# Patient Record
Sex: MALE | Race: WHITE | NOT HISPANIC OR LATINO | URBAN - METROPOLITAN AREA
[De-identification: names, ages, dates, MRNs, and addresses within clinical notes are randomized per-mention and may not be internally consistent; named-entity substitution may affect disease eponyms.]

---

## 2017-03-10 ENCOUNTER — INPATIENT (INPATIENT)
Facility: HOSPITAL | Age: 82
LOS: 6 days | Discharge: HOME HEALTH | End: 2017-03-17
Attending: INTERNAL MEDICINE | Admitting: INTERNAL MEDICINE
Payer: MEDICARE

## 2017-03-10 VITALS
WEIGHT: 160.06 LBS | HEART RATE: 81 BPM | OXYGEN SATURATION: 100 % | TEMPERATURE: 98 F | RESPIRATION RATE: 16 BRPM | DIASTOLIC BLOOD PRESSURE: 81 MMHG | SYSTOLIC BLOOD PRESSURE: 189 MMHG

## 2017-03-10 LAB
ALBUMIN SERPL ELPH-MCNC: 3.8 G/DL — SIGNIFICANT CHANGE UP (ref 3.3–5)
ALP SERPL-CCNC: 79 U/L — SIGNIFICANT CHANGE UP (ref 40–120)
ALT FLD-CCNC: 39 U/L — SIGNIFICANT CHANGE UP (ref 12–78)
ANION GAP SERPL CALC-SCNC: 11 MMOL/L — SIGNIFICANT CHANGE UP (ref 5–17)
ANION GAP SERPL CALC-SCNC: 13 MMOL/L — SIGNIFICANT CHANGE UP (ref 5–17)
APTT BLD: 37.7 SEC — HIGH (ref 27.5–37.4)
AST SERPL-CCNC: 83 U/L — HIGH (ref 15–37)
BASE EXCESS BLDA CALC-SCNC: -1 MMOL/L — SIGNIFICANT CHANGE UP (ref -2–2)
BASOPHILS # BLD AUTO: 0.1 K/UL — SIGNIFICANT CHANGE UP (ref 0–0.2)
BASOPHILS NFR BLD AUTO: 0.8 % — SIGNIFICANT CHANGE UP (ref 0–2)
BILIRUB SERPL-MCNC: 1.2 MG/DL — SIGNIFICANT CHANGE UP (ref 0.2–1.2)
BLOOD GAS COMMENTS ARTERIAL: SIGNIFICANT CHANGE UP
BUN SERPL-MCNC: 19 MG/DL — SIGNIFICANT CHANGE UP (ref 7–23)
BUN SERPL-MCNC: 21 MG/DL — SIGNIFICANT CHANGE UP (ref 7–23)
CALCIUM SERPL-MCNC: 8.3 MG/DL — LOW (ref 8.5–10.1)
CALCIUM SERPL-MCNC: 8.7 MG/DL — SIGNIFICANT CHANGE UP (ref 8.5–10.1)
CHLORIDE SERPL-SCNC: 101 MMOL/L — SIGNIFICANT CHANGE UP (ref 96–108)
CHLORIDE SERPL-SCNC: 101 MMOL/L — SIGNIFICANT CHANGE UP (ref 96–108)
CK SERPL-CCNC: 446 U/L — HIGH (ref 26–308)
CO2 SERPL-SCNC: 22 MMOL/L — SIGNIFICANT CHANGE UP (ref 22–31)
CO2 SERPL-SCNC: 26 MMOL/L — SIGNIFICANT CHANGE UP (ref 22–31)
CREAT SERPL-MCNC: 1.13 MG/DL — SIGNIFICANT CHANGE UP (ref 0.5–1.3)
CREAT SERPL-MCNC: 1.29 MG/DL — SIGNIFICANT CHANGE UP (ref 0.5–1.3)
EOSINOPHIL # BLD AUTO: 0 K/UL — SIGNIFICANT CHANGE UP (ref 0–0.5)
EOSINOPHIL NFR BLD AUTO: 0.1 % — SIGNIFICANT CHANGE UP (ref 0–6)
GLUCOSE SERPL-MCNC: 164 MG/DL — HIGH (ref 70–99)
GLUCOSE SERPL-MCNC: 165 MG/DL — HIGH (ref 70–99)
HCO3 BLDA-SCNC: 23 MMOL/L — SIGNIFICANT CHANGE UP (ref 21–29)
HCT VFR BLD CALC: 34.7 % — LOW (ref 39–50)
HCT VFR BLD CALC: 36 % — LOW (ref 39–50)
HCT VFR BLD CALC: 37.4 % — LOW (ref 39–50)
HGB BLD-MCNC: 11.5 G/DL — LOW (ref 13–17)
HGB BLD-MCNC: 12.3 G/DL — LOW (ref 13–17)
HGB BLD-MCNC: 12.8 G/DL — LOW (ref 13–17)
HPIV3 RNA SPEC QL NAA+PROBE: DETECTED
INR BLD: 1.76 RATIO — HIGH (ref 0.88–1.16)
INR BLD: 1.87 RATIO — HIGH (ref 0.88–1.16)
LACTATE SERPL-SCNC: 2.1 MMOL/L — HIGH (ref 0.7–2)
LACTATE SERPL-SCNC: 3.8 MMOL/L — HIGH (ref 0.7–2)
LIDOCAIN IGE QN: 79 U/L — SIGNIFICANT CHANGE UP (ref 73–393)
LYMPHOCYTES # BLD AUTO: 2.4 K/UL — SIGNIFICANT CHANGE UP (ref 1–3.3)
LYMPHOCYTES # BLD AUTO: 23.4 % — SIGNIFICANT CHANGE UP (ref 13–44)
MAGNESIUM SERPL-MCNC: 1.8 MG/DL — SIGNIFICANT CHANGE UP (ref 1.8–2.4)
MAGNESIUM SERPL-MCNC: 2.1 MG/DL — SIGNIFICANT CHANGE UP (ref 1.8–2.4)
MCHC RBC-ENTMCNC: 32.9 GM/DL — SIGNIFICANT CHANGE UP (ref 32–36)
MCHC RBC-ENTMCNC: 33 GM/DL — SIGNIFICANT CHANGE UP (ref 32–36)
MCHC RBC-ENTMCNC: 33.1 PG — SIGNIFICANT CHANGE UP (ref 27–34)
MCHC RBC-ENTMCNC: 33.2 PG — SIGNIFICANT CHANGE UP (ref 27–34)
MCHC RBC-ENTMCNC: 35.7 GM/DL — SIGNIFICANT CHANGE UP (ref 32–36)
MCHC RBC-ENTMCNC: 35.8 PG — HIGH (ref 27–34)
MCV RBC AUTO: 100.3 FL — HIGH (ref 80–100)
MCV RBC AUTO: 100.6 FL — HIGH (ref 80–100)
MCV RBC AUTO: 100.6 FL — HIGH (ref 80–100)
MONOCYTES # BLD AUTO: 1.1 K/UL — HIGH (ref 0–0.9)
MONOCYTES NFR BLD AUTO: 11.2 % — SIGNIFICANT CHANGE UP (ref 2–14)
NEUTROPHILS # BLD AUTO: 6.5 K/UL — SIGNIFICANT CHANGE UP (ref 1.8–7.4)
NEUTROPHILS NFR BLD AUTO: 64.4 % — SIGNIFICANT CHANGE UP (ref 43–77)
NT-PROBNP SERPL-SCNC: HIGH PG/ML (ref 0–450)
NT-PROBNP SERPL-SCNC: HIGH PG/ML (ref 0–450)
PCO2 BLDA: 40 MMHG — SIGNIFICANT CHANGE UP (ref 32–46)
PH BLDA: 7.39 — SIGNIFICANT CHANGE UP (ref 7.35–7.45)
PHOSPHATE SERPL-MCNC: 2.4 MG/DL — LOW (ref 2.5–4.5)
PHOSPHATE SERPL-MCNC: 2.7 MG/DL — SIGNIFICANT CHANGE UP (ref 2.5–4.5)
PLATELET # BLD AUTO: 112 K/UL — LOW (ref 150–400)
PLATELET # BLD AUTO: 114 K/UL — LOW (ref 150–400)
PLATELET # BLD AUTO: 133 K/UL — LOW (ref 150–400)
PO2 BLDA: 99 — SIGNIFICANT CHANGE UP
POTASSIUM SERPL-MCNC: 3.4 MMOL/L — LOW (ref 3.5–5.3)
POTASSIUM SERPL-MCNC: 4.7 MMOL/L — SIGNIFICANT CHANGE UP (ref 3.5–5.3)
POTASSIUM SERPL-SCNC: 3.4 MMOL/L — LOW (ref 3.5–5.3)
POTASSIUM SERPL-SCNC: 4.7 MMOL/L — SIGNIFICANT CHANGE UP (ref 3.5–5.3)
PROT SERPL-MCNC: 7.8 GM/DL — SIGNIFICANT CHANGE UP (ref 6–8.3)
PROTHROM AB SERPL-ACNC: 19.5 SEC — HIGH (ref 10–13.1)
PROTHROM AB SERPL-ACNC: 20.7 SEC — HIGH (ref 10–13.1)
RAPID RVP RESULT: DETECTED
RBC # BLD: 3.45 M/UL — LOW (ref 4.2–5.8)
RBC # BLD: 3.59 M/UL — LOW (ref 4.2–5.8)
RBC # BLD: 3.72 M/UL — LOW (ref 4.2–5.8)
RBC # FLD: 12.4 % — SIGNIFICANT CHANGE UP (ref 10.3–14.5)
RBC # FLD: 12.4 % — SIGNIFICANT CHANGE UP (ref 10.3–14.5)
RBC # FLD: 12.6 % — SIGNIFICANT CHANGE UP (ref 10.3–14.5)
SAO2 % BLDA: 97 — SIGNIFICANT CHANGE UP
SODIUM SERPL-SCNC: 136 MMOL/L — SIGNIFICANT CHANGE UP (ref 135–145)
SODIUM SERPL-SCNC: 138 MMOL/L — SIGNIFICANT CHANGE UP (ref 135–145)
TROPONIN I SERPL-MCNC: 0.17 NG/ML — HIGH (ref 0.01–0.04)
TROPONIN I SERPL-MCNC: 0.17 NG/ML — HIGH (ref 0.01–0.04)
WBC # BLD: 10.2 K/UL — SIGNIFICANT CHANGE UP (ref 3.8–10.5)
WBC # BLD: 10.2 K/UL — SIGNIFICANT CHANGE UP (ref 3.8–10.5)
WBC # BLD: 8.5 K/UL — SIGNIFICANT CHANGE UP (ref 3.8–10.5)
WBC # FLD AUTO: 10.2 K/UL — SIGNIFICANT CHANGE UP (ref 3.8–10.5)
WBC # FLD AUTO: 10.2 K/UL — SIGNIFICANT CHANGE UP (ref 3.8–10.5)
WBC # FLD AUTO: 8.5 K/UL — SIGNIFICANT CHANGE UP (ref 3.8–10.5)

## 2017-03-10 PROCEDURE — 99285 EMERGENCY DEPT VISIT HI MDM: CPT

## 2017-03-10 PROCEDURE — 93010 ELECTROCARDIOGRAM REPORT: CPT

## 2017-03-10 PROCEDURE — 93306 TTE W/DOPPLER COMPLETE: CPT | Mod: 26

## 2017-03-10 PROCEDURE — 71010: CPT | Mod: 26

## 2017-03-10 PROCEDURE — 71010: CPT | Mod: 26,77

## 2017-03-10 RX ORDER — INSULIN LISPRO 100/ML
VIAL (ML) SUBCUTANEOUS
Qty: 0 | Refills: 0 | Status: DISCONTINUED | OUTPATIENT
Start: 2017-03-10 | End: 2017-03-17

## 2017-03-10 RX ORDER — HEPARIN SODIUM 5000 [USP'U]/ML
5000 INJECTION INTRAVENOUS; SUBCUTANEOUS EVERY 8 HOURS
Qty: 0 | Refills: 0 | Status: DISCONTINUED | OUTPATIENT
Start: 2017-03-10 | End: 2017-03-10

## 2017-03-10 RX ORDER — SODIUM CHLORIDE 9 MG/ML
1000 INJECTION INTRAMUSCULAR; INTRAVENOUS; SUBCUTANEOUS ONCE
Qty: 0 | Refills: 0 | Status: COMPLETED | OUTPATIENT
Start: 2017-03-10 | End: 2017-03-10

## 2017-03-10 RX ORDER — SODIUM CHLORIDE 9 MG/ML
1000 INJECTION, SOLUTION INTRAVENOUS
Qty: 0 | Refills: 0 | Status: DISCONTINUED | OUTPATIENT
Start: 2017-03-10 | End: 2017-03-17

## 2017-03-10 RX ORDER — DEXTROSE 50 % IN WATER 50 %
12.5 SYRINGE (ML) INTRAVENOUS ONCE
Qty: 0 | Refills: 0 | Status: DISCONTINUED | OUTPATIENT
Start: 2017-03-10 | End: 2017-03-17

## 2017-03-10 RX ORDER — ACETAMINOPHEN 500 MG
650 TABLET ORAL ONCE
Qty: 0 | Refills: 0 | Status: COMPLETED | OUTPATIENT
Start: 2017-03-10 | End: 2017-03-10

## 2017-03-10 RX ORDER — PIPERACILLIN AND TAZOBACTAM 4; .5 G/20ML; G/20ML
3.38 INJECTION, POWDER, LYOPHILIZED, FOR SOLUTION INTRAVENOUS ONCE
Qty: 0 | Refills: 0 | Status: COMPLETED | OUTPATIENT
Start: 2017-03-10 | End: 2017-03-10

## 2017-03-10 RX ORDER — PIPERACILLIN AND TAZOBACTAM 4; .5 G/20ML; G/20ML
2.25 INJECTION, POWDER, LYOPHILIZED, FOR SOLUTION INTRAVENOUS EVERY 6 HOURS
Qty: 0 | Refills: 0 | Status: DISCONTINUED | OUTPATIENT
Start: 2017-03-10 | End: 2017-03-10

## 2017-03-10 RX ORDER — WARFARIN SODIUM 2.5 MG/1
2.5 TABLET ORAL DAILY
Qty: 0 | Refills: 0 | Status: DISCONTINUED | OUTPATIENT
Start: 2017-03-10 | End: 2017-03-13

## 2017-03-10 RX ORDER — ASPIRIN/CALCIUM CARB/MAGNESIUM 324 MG
325 TABLET ORAL ONCE
Qty: 0 | Refills: 0 | Status: COMPLETED | OUTPATIENT
Start: 2017-03-10 | End: 2017-03-10

## 2017-03-10 RX ORDER — SODIUM CHLORIDE 9 MG/ML
200 INJECTION INTRAMUSCULAR; INTRAVENOUS; SUBCUTANEOUS ONCE
Qty: 0 | Refills: 0 | Status: COMPLETED | OUTPATIENT
Start: 2017-03-10 | End: 2017-03-10

## 2017-03-10 RX ORDER — SIMVASTATIN 20 MG/1
40 TABLET, FILM COATED ORAL AT BEDTIME
Qty: 0 | Refills: 0 | Status: DISCONTINUED | OUTPATIENT
Start: 2017-03-10 | End: 2017-03-17

## 2017-03-10 RX ORDER — PIPERACILLIN AND TAZOBACTAM 4; .5 G/20ML; G/20ML
3.38 INJECTION, POWDER, LYOPHILIZED, FOR SOLUTION INTRAVENOUS EVERY 8 HOURS
Qty: 0 | Refills: 0 | Status: DISCONTINUED | OUTPATIENT
Start: 2017-03-10 | End: 2017-03-13

## 2017-03-10 RX ORDER — IPRATROPIUM/ALBUTEROL SULFATE 18-103MCG
3 AEROSOL WITH ADAPTER (GRAM) INHALATION ONCE
Qty: 0 | Refills: 0 | Status: COMPLETED | OUTPATIENT
Start: 2017-03-10 | End: 2017-03-10

## 2017-03-10 RX ORDER — METOPROLOL TARTRATE 50 MG
50 TABLET ORAL DAILY
Qty: 0 | Refills: 0 | Status: DISCONTINUED | OUTPATIENT
Start: 2017-03-10 | End: 2017-03-17

## 2017-03-10 RX ORDER — VANCOMYCIN HCL 1 G
1000 VIAL (EA) INTRAVENOUS ONCE
Qty: 0 | Refills: 0 | Status: COMPLETED | OUTPATIENT
Start: 2017-03-10 | End: 2017-03-10

## 2017-03-10 RX ORDER — DEXTROSE 50 % IN WATER 50 %
25 SYRINGE (ML) INTRAVENOUS ONCE
Qty: 0 | Refills: 0 | Status: DISCONTINUED | OUTPATIENT
Start: 2017-03-10 | End: 2017-03-17

## 2017-03-10 RX ORDER — LEVOTHYROXINE SODIUM 125 MCG
75 TABLET ORAL DAILY
Qty: 0 | Refills: 0 | Status: DISCONTINUED | OUTPATIENT
Start: 2017-03-10 | End: 2017-03-17

## 2017-03-10 RX ORDER — DILTIAZEM HCL 120 MG
120 CAPSULE, EXT RELEASE 24 HR ORAL DAILY
Qty: 0 | Refills: 0 | Status: DISCONTINUED | OUTPATIENT
Start: 2017-03-10 | End: 2017-03-17

## 2017-03-10 RX ORDER — GLUCAGON INJECTION, SOLUTION 0.5 MG/.1ML
1 INJECTION, SOLUTION SUBCUTANEOUS ONCE
Qty: 0 | Refills: 0 | Status: DISCONTINUED | OUTPATIENT
Start: 2017-03-10 | End: 2017-03-17

## 2017-03-10 RX ORDER — DIGOXIN 250 MCG
0.12 TABLET ORAL DAILY
Qty: 0 | Refills: 0 | Status: DISCONTINUED | OUTPATIENT
Start: 2017-03-10 | End: 2017-03-17

## 2017-03-10 RX ORDER — ASPIRIN/CALCIUM CARB/MAGNESIUM 324 MG
81 TABLET ORAL ONCE
Qty: 0 | Refills: 0 | Status: COMPLETED | OUTPATIENT
Start: 2017-03-10 | End: 2017-03-10

## 2017-03-10 RX ORDER — FUROSEMIDE 40 MG
40 TABLET ORAL ONCE
Qty: 0 | Refills: 0 | Status: COMPLETED | OUTPATIENT
Start: 2017-03-10 | End: 2017-03-10

## 2017-03-10 RX ORDER — DEXTROSE 50 % IN WATER 50 %
1 SYRINGE (ML) INTRAVENOUS ONCE
Qty: 0 | Refills: 0 | Status: DISCONTINUED | OUTPATIENT
Start: 2017-03-10 | End: 2017-03-17

## 2017-03-10 RX ORDER — FUROSEMIDE 40 MG
40 TABLET ORAL EVERY 12 HOURS
Qty: 0 | Refills: 0 | Status: DISCONTINUED | OUTPATIENT
Start: 2017-03-10 | End: 2017-03-12

## 2017-03-10 RX ADMIN — Medication 325 MILLIGRAM(S): at 22:23

## 2017-03-10 RX ADMIN — Medication 81 MILLIGRAM(S): at 09:28

## 2017-03-10 RX ADMIN — Medication 0.12 MILLIGRAM(S): at 12:57

## 2017-03-10 RX ADMIN — Medication 50 MILLIGRAM(S): at 12:57

## 2017-03-10 RX ADMIN — Medication 3 MILLILITER(S): at 20:53

## 2017-03-10 RX ADMIN — Medication 40 MILLIGRAM(S): at 17:41

## 2017-03-10 RX ADMIN — Medication 125 MILLIGRAM(S): at 22:22

## 2017-03-10 RX ADMIN — Medication 75 MICROGRAM(S): at 12:57

## 2017-03-10 RX ADMIN — PIPERACILLIN AND TAZOBACTAM 200 GRAM(S): 4; .5 INJECTION, POWDER, LYOPHILIZED, FOR SOLUTION INTRAVENOUS at 07:59

## 2017-03-10 RX ADMIN — SIMVASTATIN 40 MILLIGRAM(S): 20 TABLET, FILM COATED ORAL at 23:47

## 2017-03-10 RX ADMIN — WARFARIN SODIUM 2.5 MILLIGRAM(S): 2.5 TABLET ORAL at 22:02

## 2017-03-10 RX ADMIN — SODIUM CHLORIDE 3000 MILLILITER(S): 9 INJECTION INTRAMUSCULAR; INTRAVENOUS; SUBCUTANEOUS at 08:00

## 2017-03-10 RX ADMIN — Medication 250 MILLIGRAM(S): at 08:30

## 2017-03-10 RX ADMIN — Medication 40 MILLIGRAM(S): at 12:57

## 2017-03-10 RX ADMIN — Medication 3 MILLILITER(S): at 07:21

## 2017-03-10 RX ADMIN — Medication 120 MILLIGRAM(S): at 12:57

## 2017-03-10 RX ADMIN — PIPERACILLIN AND TAZOBACTAM 200 GRAM(S): 4; .5 INJECTION, POWDER, LYOPHILIZED, FOR SOLUTION INTRAVENOUS at 22:02

## 2017-03-10 RX ADMIN — SODIUM CHLORIDE 600 MILLILITER(S): 9 INJECTION INTRAMUSCULAR; INTRAVENOUS; SUBCUTANEOUS at 13:04

## 2017-03-10 RX ADMIN — SODIUM CHLORIDE 3000 MILLILITER(S): 9 INJECTION INTRAMUSCULAR; INTRAVENOUS; SUBCUTANEOUS at 09:28

## 2017-03-10 RX ADMIN — Medication 650 MILLIGRAM(S): at 08:25

## 2017-03-10 NOTE — H&P ADULT - HISTORY OF PRESENT ILLNESS
89 yo male with h/o CAD come c/o SOB; he started feeling sick few days prior to adm but the past 24 hrs his breathing worsened, became anorectic and had nausea and vomiting; no CP no LOC; he is SOB, can't speak full sentences O2 sat 95% on 2 L, overall VS stable he will be adm to tele; lactate noted.    PMHX: CAD, CABG, STENTS, HYPOTHYROIDISM,  PSHX: CABG  FAM HX-NEG  SOCHX: NEG

## 2017-03-10 NOTE — ED ADULT NURSE REASSESSMENT NOTE - NS ED NURSE REASSESS COMMENT FT1
pt who initially refused rectal temp did give permission.  Temp is 101.6R.  Pt given tylenol, zosyn per md order.  Blankets removed, pt given sheet.   Explained to pt that the blankets will worsen the fever.  Pt states understanding.

## 2017-03-10 NOTE — H&P ADULT - NSHPPHYSICALEXAM_GEN_ALL_CORE
PHYSICAL EXAM:      ENMT: nc/at, perrla, eomi    Neck:supple no jvd    Respiratory: decreased BS bilat, bibasilar crackles    Cardiovascular: s1s2 irreg    Gastrointestinal: soft, nt/nd, + BS    Genitourinary: deferred    Rectal: deferred    Extremities: no edema, clubbing cyanosis    Vascular: intact    Neurological: mot str 5/5 all extremities, no sens deficit    Skin: dry

## 2017-03-10 NOTE — DIETITIAN INITIAL EVALUATION ADULT. - OTHER INFO
Pt reports little to no change in wt over the past few months. Pt has documented wt loss over 2 years (8% of BW not sig. wt loss)

## 2017-03-10 NOTE — H&P ADULT - ASSESSMENT
89 yo male with h/o CAD come c/o SOB    * SOB diff dx: CHF exac sec to PNA ( viral vs bacterial)  - parainfl and RSV +  - add lasix IV bid  - nebs  - check echo; dc Cardizem if EF low    * AF: resume vka, dig and cardizem for now

## 2017-03-10 NOTE — CONSULT NOTE ADULT - SUBJECTIVE AND OBJECTIVE BOX
Patient is a 89y old  Male who presents with a chief complaint of sob (10 Mar 2017 10:19)      HPI:  89 yo male with h/o CAD come c/o SOB; he started feeling sick few days prior to adm but the past 24 hrs his breathing worsened, became anorectic and had nausea and vomiting; no CP no LOC; he is SOB, can't speak full sentences O2 sat 95% on 2 L, overall VS stable he will be adm to tele; lactate noted.    PMHX: CAD, CABG, STENTS, HYPOTHYROIDISM,  PSHX: CABG, PPM  FAM HX-NEG  SOCHX: NEG (10 Mar 2017 10:19)        MEDICATIONS  (STANDING):  furosemide   Injectable 40milliGRAM(s) IV Push every 12 hours  diltiazem   CD 120milliGRAM(s) Oral daily  digoxin     Tablet 0.125milliGRAM(s) Oral daily  warfarin 2.5milliGRAM(s) Oral daily  metoprolol succinate ER 50milliGRAM(s) Oral daily  levothyroxine  Oral Tab/Cap - Peds 75MICROGram(s) Oral daily  simvastatin 40milliGRAM(s) Oral at bedtime  insulin lispro (HumaLOG) corrective regimen sliding scale  SubCutaneous three times a day before meals  dextrose 5%. 1000milliLiter(s) IV Continuous <Continuous>  dextrose 50% Injectable 12.5Gram(s) IV Push once  dextrose 50% Injectable 25Gram(s) IV Push once  dextrose 50% Injectable 25Gram(s) IV Push once    MEDICATIONS  (PRN):  dextrose Gel 1Dose(s) Oral once PRN Blood Glucose LESS THAN 70 milliGRAM(s)/deciliter  glucagon  Injectable 1milliGRAM(s) IntraMuscular once PRN Glucose LESS THAN 70 milligrams/deciliter      FAMILY HISTORY:  No pertinent family history in first degree relatives      Vital Signs Last 24 Hrs  T(C): 36.6, Max: 38.7 (03-10 @ 07:25)  T(F): 97.9, Max: 101.6 (03-10 @ 07:25)  HR: 66 (66 - 103)  BP: 124/59 (109/61 - 189/81)  BP(mean): --  RR: 18 (16 - 25)  SpO2: 96% (95% - 100%)    I&O's Summary    PHYSICAL EXAM  General Appearance: acutely ill  HEENT: PERRL, conjunctiva clear, EOM's intact, non injected pharynx, no exudate, TM   normal  Neck: Supple, , no adenopathy, thyroid: not enlarged, no carotid bruit or JVD  Back: Symmetric, no  tenderness,no soft tissue tenderness  Lungs: slight bibasilar rales  Heart:irregular rhythm no murmur or gallop  Abdomen: Soft, non-tender, bowel sounds active , no hepatosplenomegaly  Extremities: no cyanosis or edema, no joint swelling  Skin: Skin color, texture normal, no rashes   Neurologic: Alert and oriented X3 , cranial nerves intact, sensory and motor normal,        INTERPRETATION OF TELEMETRY: AF with demand pacing    ECG: DEmand pacing, underlying AFib.        LABS:                          11.5   8.5   )-----------( 112      ( 10 Mar 2017 13:34 )             34.7     10 Mar 2017 06:22    136    |  101    |  19     ----------------------------<  165    4.7     |  22     |  1.29     Ca    8.7        10 Mar 2017 06:22  Phos  2.4       10 Mar 2017 06:22  Mg     2.1       10 Mar 2017 06:22    TPro  7.8    /  Alb  3.8    /  TBili  1.2    /  DBili  x      /  AST  83     /  ALT  39     /  AlkPhos  79     10 Mar 2017 06:22    CARDIAC MARKERS ( 10 Mar 2017 06:22 )  0.165 ng/mL / x     / x     / x     / x            Pro BNP  [id number] 03-10 @ 13:34  D Dimer  -- 03-10 @ 13:34  Pro BNP  [id number] 03-10 @ 06:22  D Dimer  -- 03-10 @ 06:22    PT/INR - ( 10 Mar 2017 06:22 )   PT: 19.5 sec;   INR: 1.76 ratio         PTT - ( 10 Mar 2017 06:22 )  PTT:37.7 sec          RADIOLOGY & ADDITIONAL STUDIES: CXR- bibasilar opacities.    IMPRESSION:  1. Parainfluenza 3 infection  2.Bibasila pulmonary opacities. CHF vs pnsumonia.  3.CAD, s/p CABG 35 year ago.  4. Afib, long term persistent. s/p PPM.  5.Hypertension  6.Diabetes mellitus  Plan: Echocardiogram for LV function.  Supportive care. Oxygen as needed. Cautious use of furosemide. Suggest consider ID consultation. Continue diltiazem , dig, metoprolol for rate control and warfarin anticoagulation. Continue statin.   Will follow.

## 2017-03-10 NOTE — ED ADULT NURSE NOTE - OBJECTIVE STATEMENT
presents to the ED c/o SOB that started in the middle of the night tonight. as per wife at bedside, he had a recent cold with upper respiratory symptoms and was seen by PMD and d/c'd home. reports soar throat, cough, and fever. refusing rectal temp at this time. pt noted here to have cough with increased work of breathing. SOB at rest. decreased lung sounds at the bases with audible wheeze. O2 sat here 89% on room air. placed on o2, now %. denies chest pain, nausea, vomiting. EKG done. IVL placed and labs draw. placed on cardiac monitor. will monitor closely.

## 2017-03-10 NOTE — ED PROVIDER NOTE - OBJECTIVE STATEMENT
pt presents with two days increased dyspnea assoicated with non productive cough. denies fever. he says he has chills denies HA or neck pain. no chest pain + sob. no abd pain. no n/v/d. no urinary f/u/d. no back pain. no motor or sensory deficits. denies drug use. no recent travel. no rash. no other acute issues symptoms or concerns  noh/o dvt or pe no hemoptysis

## 2017-03-10 NOTE — ED PROVIDER NOTE - PROGRESS NOTE DETAILS
pt refused rectal temp upon getting  to ed when I arrived pt here for one hour no code sepsis activated after rectal temp we are aware of septic picture and are implementing EGDT. he has no signs of volume overload on exam and thus will give 30 cc/kg fluids with close resp monitoring

## 2017-03-10 NOTE — CHART NOTE - NSCHARTNOTEFT_GEN_A_CORE
89yo male admitted for HF exacerbation with parainfluenza virus, and possible superimposed pneumonia.   underlying COPD? w/copd exacerbation?    Pt c/o sob. Pt has had sob for past year, needed to use 2 pillows at bedtime, now with head of bed up to about 45degrees and sob.   Pt able to speak but sob while speaking.    T:97.9F  rr:24  bp: 160/79  spo2:99% on 50% venti mask  HR: 85bpm    cvs: +s1/s2  irregularly irregular  resp: +rhonchi b/l  abd: +bs soft nontender  ext: 1+pitting edema b/l    a/p: 89yo male admitted for HF exacerbation with parainfluenza virus, and possible superimposed pneumonia w/ sob.    -inc o2 to 50% venti mask  -abg stat  -cxr stat shows no acute changes from previous cxr this am  -duoneb stat  -if duoneb does not provide relief, agree with plan to give IV steroids  -continue zosyn as only one dose was given thus far  -if CO2 retention on abg will start bipap  -continue to monitor  -recheck and monitor bp as well    Agree with intern Dr. Parekh's plan, discussed plan with patient and family member at bedside

## 2017-03-10 NOTE — PROGRESS NOTE ADULT - SUBJECTIVE AND OBJECTIVE BOX
Notified by RN patient with progressively worsening SOB.    Patient seen and examined at the bedside. Complains of SOB and visibly has difficulty speaking. Denies CP, N/V/D or additional complaints.    Vitals: 160/79, 99% O2, 85P, >20 resp    PE  Gen: Uncomfortable  CV: S1/S2  Resp: Bibasilar rales  Abd: Soft, non-tender  Peripheral: Pulses present, minimal pitting edema (<1+)    A/P  -duoneb stat  -CXR: bibasilar atelectasis/pneumonia, no significant pleural effusions  -f/u ABG  -Venti-mask for now, consider BIPAP if CO2 retention on ABG  -Continue zosyn 2.25g q6h renally dosed for CAP  -Will consider stress dose IV steroid  -Consider ID consult    Case d/w PGY3 Dr. King Notified by RN patient with progressively worsening SOB.    Patient seen and examined at the bedside. Complains of SOB and visibly has difficulty speaking. Denies CP, N/V/D or additional complaints.    Vitals: 160/79, 99% O2, 85P, >20 resp    PE  Gen: Uncomfortable  CV: S1/S2  Resp: Bibasilar rales  Abd: Soft, non-tender  Peripheral: Pulses present, minimal pitting edema (<1+)    A/P  -duoneb stat  -CXR: bibasilar atelectasis/pneumonia, no significant pleural effusions  -f/u ABG  -Venti-mask for now, consider BIPAP if CO2 retention on ABG  -Continue zosyn 2.25g q6h renally dosed for CAP  -Will consider stress dose IV steroid  -Consider ID consult    Case d/w PGY3 Dr. King    Addendum: Will administer 125mg methylprednisolone once  Positive troponin on admission and EKG noted for paced rhythm (w/ ST elevations on paced beats)  Due to history of CAD s/p stents will repeat EKG, SAMANTHA panel, aspirin 325. Noted patient subtherapeutic on warfarin. Notified by RN patient with progressively worsening SOB.    Patient seen and examined at the bedside. Complains of SOB and visibly has difficulty speaking. Denies CP, N/V/D or additional complaints.    Vitals: 160/79, 99% O2, 85P, >20 resp    PE  Gen: Uncomfortable  CV: S1/S2  Resp: Bibasilar rales  Abd: Soft, non-tender  Peripheral: Pulses present, minimal pitting edema (<1+)    A/P  -duoneb stat  -CXR: bibasilar atelectasis/pneumonia, no significant pleural effusions  -f/u ABG  -Venti-mask for now, consider BIPAP if CO2 retention on ABG  -Continue zosyn 2.25g q6h renally dosed for CAP  -Will consider stress dose IV steroid  -Consider ID consult    Case d/w PGY3 Dr. King    Addendum: Will administer 125mg methylprednisolone once  Positive troponin on admission and EKG noted for paced rhythm (w/ ST elevations on paced beats)  Due to history of CAD s/p stents will repeat EKG, SAMANTHA panel, aspirin 325. Noted patient subtherapeutic on warfarin.    Addendum: Troponins stable. EKGs reviewed and case d/w Dr. Griffith

## 2017-03-10 NOTE — H&P ADULT - NSHPLABSRESULTS_GEN_ALL_CORE
12.3   10.2  )-----------( 133      ( 10 Mar 2017 06:22 )             37.4   10 Mar 2017 06:22    136    |  101    |  19     ----------------------------<  165    4.7     |  22     |  1.29     Ca    8.7        10 Mar 2017 06:22  Phos  2.4       10 Mar 2017 06:22  Mg     2.1       10 Mar 2017 06:22    TPro  7.8    /  Alb  3.8    /  TBili  1.2    /  DBili  x      /  AST  83     /  ALT  39     /  AlkPhos  79     10 Mar 2017 06:22

## 2017-03-11 LAB
ANION GAP SERPL CALC-SCNC: 12 MMOL/L — SIGNIFICANT CHANGE UP (ref 5–17)
BUN SERPL-MCNC: 23 MG/DL — SIGNIFICANT CHANGE UP (ref 7–23)
CALCIUM SERPL-MCNC: 8.3 MG/DL — LOW (ref 8.5–10.1)
CHLORIDE SERPL-SCNC: 101 MMOL/L — SIGNIFICANT CHANGE UP (ref 96–108)
CO2 SERPL-SCNC: 25 MMOL/L — SIGNIFICANT CHANGE UP (ref 22–31)
CREAT SERPL-MCNC: 1.14 MG/DL — SIGNIFICANT CHANGE UP (ref 0.5–1.3)
GLUCOSE SERPL-MCNC: 167 MG/DL — HIGH (ref 70–99)
HBA1C BLD-MCNC: 6.5 % — HIGH (ref 4–5.6)
HCT VFR BLD CALC: 35.1 % — LOW (ref 39–50)
HGB BLD-MCNC: 12.5 G/DL — LOW (ref 13–17)
MCHC RBC-ENTMCNC: 35.5 GM/DL — SIGNIFICANT CHANGE UP (ref 32–36)
MCHC RBC-ENTMCNC: 35.6 PG — HIGH (ref 27–34)
MCV RBC AUTO: 100.3 FL — HIGH (ref 80–100)
PLATELET # BLD AUTO: 110 K/UL — LOW (ref 150–400)
POTASSIUM SERPL-MCNC: 3.6 MMOL/L — SIGNIFICANT CHANGE UP (ref 3.5–5.3)
POTASSIUM SERPL-SCNC: 3.6 MMOL/L — SIGNIFICANT CHANGE UP (ref 3.5–5.3)
RBC # BLD: 3.5 M/UL — LOW (ref 4.2–5.8)
RBC # FLD: 12.2 % — SIGNIFICANT CHANGE UP (ref 10.3–14.5)
SODIUM SERPL-SCNC: 138 MMOL/L — SIGNIFICANT CHANGE UP (ref 135–145)
TROPONIN I SERPL-MCNC: 0.12 NG/ML — HIGH (ref 0.01–0.04)
TROPONIN I SERPL-MCNC: 0.16 NG/ML — HIGH (ref 0.01–0.04)
WBC # BLD: 6.7 K/UL — SIGNIFICANT CHANGE UP (ref 3.8–10.5)
WBC # FLD AUTO: 6.7 K/UL — SIGNIFICANT CHANGE UP (ref 3.8–10.5)

## 2017-03-11 PROCEDURE — 74000: CPT | Mod: 26

## 2017-03-11 PROCEDURE — 93010 ELECTROCARDIOGRAM REPORT: CPT

## 2017-03-11 RX ORDER — IPRATROPIUM/ALBUTEROL SULFATE 18-103MCG
3 AEROSOL WITH ADAPTER (GRAM) INHALATION EVERY 6 HOURS
Qty: 0 | Refills: 0 | Status: DISCONTINUED | OUTPATIENT
Start: 2017-03-11 | End: 2017-03-17

## 2017-03-11 RX ORDER — ALBUTEROL 90 UG/1
1 AEROSOL, METERED ORAL EVERY 4 HOURS
Qty: 0 | Refills: 0 | Status: DISCONTINUED | OUTPATIENT
Start: 2017-03-11 | End: 2017-03-17

## 2017-03-11 RX ORDER — SIMETHICONE 80 MG/1
120 TABLET, CHEWABLE ORAL THREE TIMES A DAY
Qty: 0 | Refills: 0 | Status: DISCONTINUED | OUTPATIENT
Start: 2017-03-11 | End: 2017-03-17

## 2017-03-11 RX ORDER — TIOTROPIUM BROMIDE 18 UG/1
1 CAPSULE ORAL; RESPIRATORY (INHALATION) DAILY
Qty: 0 | Refills: 0 | Status: DISCONTINUED | OUTPATIENT
Start: 2017-03-11 | End: 2017-03-17

## 2017-03-11 RX ADMIN — PIPERACILLIN AND TAZOBACTAM 25 GRAM(S): 4; .5 INJECTION, POWDER, LYOPHILIZED, FOR SOLUTION INTRAVENOUS at 22:42

## 2017-03-11 RX ADMIN — PIPERACILLIN AND TAZOBACTAM 25 GRAM(S): 4; .5 INJECTION, POWDER, LYOPHILIZED, FOR SOLUTION INTRAVENOUS at 05:01

## 2017-03-11 RX ADMIN — WARFARIN SODIUM 2.5 MILLIGRAM(S): 2.5 TABLET ORAL at 18:40

## 2017-03-11 RX ADMIN — Medication 75 MICROGRAM(S): at 06:22

## 2017-03-11 RX ADMIN — SIMVASTATIN 40 MILLIGRAM(S): 20 TABLET, FILM COATED ORAL at 18:40

## 2017-03-11 RX ADMIN — PIPERACILLIN AND TAZOBACTAM 25 GRAM(S): 4; .5 INJECTION, POWDER, LYOPHILIZED, FOR SOLUTION INTRAVENOUS at 16:53

## 2017-03-11 RX ADMIN — Medication 3 MILLILITER(S): at 02:40

## 2017-03-11 RX ADMIN — Medication 3 MILLILITER(S): at 20:50

## 2017-03-11 RX ADMIN — Medication 3 MILLILITER(S): at 13:29

## 2017-03-11 RX ADMIN — Medication 40 MILLIGRAM(S): at 05:01

## 2017-03-11 RX ADMIN — Medication 0.12 MILLIGRAM(S): at 06:22

## 2017-03-11 RX ADMIN — Medication 3 MILLILITER(S): at 08:18

## 2017-03-11 RX ADMIN — Medication 120 MILLIGRAM(S): at 06:21

## 2017-03-11 RX ADMIN — Medication 50 MILLIGRAM(S): at 06:22

## 2017-03-11 RX ADMIN — Medication 40 MILLIGRAM(S): at 16:50

## 2017-03-11 NOTE — SWALLOW BEDSIDE ASSESSMENT ADULT - ORAL PHASE
Bolus formation/transfer were mildly+ prolonged but mechanically functional for age without evidence of an overt oral motor focality. Piecemeal deglutition was evident which is age expected. Though it is noted that pt sometimes transiently cessated  oral processing actions when increasingly SOB from exertion. With that being stated, pt did clear oral debris on own given increased times. Bolus formation/transfer were mildly+ prolonged but mechanically functional for age without evidence of an overt oral motor focality. Piecemeal deglutition was evident which is age expected. Though it is noted that pt sometimes transiently cessated oral processing actions when increasingly SOB from exertion. With that being stated, pt did clear oral debris on own given increased times.

## 2017-03-11 NOTE — SWALLOW BEDSIDE ASSESSMENT ADULT - SWALLOW EVAL: DIAGNOSIS
1) Patient demonstrates relatively mild functional Oropharyngeal Presbyphagia which is hindered by Aerophagia at times due to acute respiratory compromise(i.e RSV, Parainfluenza, CHF). Pt did demonstrate variable difficulties coordinating respiration into oropharyngeal swallow sequence. With that being stated, pt did possess a sufficient level of oropharyngeal swallowing preservation for age(89) nonetheless when he was not significantly dyspneic. Of note is that Presbyphagia/Aerophagia may place pt at a relatively heightened risk for episodic aspiration, though it should be indicated that no behavioral aspiration signs were exhibited on exam.   2) Patient demonstrates reduced breath support for phonation with resultant decreased vocal intensity/breathiness in setting of acute pulmonary compromise(i.e RSV, parainfluenza, CHF). With that being stated, he was able to verbalize his intents without evidence of an overt primary motor speech or primary linguistic pathology.

## 2017-03-11 NOTE — SWALLOW BEDSIDE ASSESSMENT ADULT - PHARYNGEAL PHASE
Swallow trigger was timely to minimally latent and sometimes dysynchronous with respiration. Laryngeal lift on palpation was mildly decreased during swallowing trials but grossly functional  and within age acceptable parameters. No behavioral aspiration signs were exhibited on exam. Odynophagia and dyspepsia were denied.

## 2017-03-11 NOTE — SWALLOW BEDSIDE ASSESSMENT ADULT - SWALLOW EVAL: RECOMMENDED FEEDING/EATING TECHNIQUES
small sips/bites/position upright (90 degrees)/Pt should eat at a slowed pace and allow increased time to clear mouth from food debris prior to presenting new boluses into oral cavity.

## 2017-03-11 NOTE — SWALLOW BEDSIDE ASSESSMENT ADULT - ORAL PREPARATORY PHASE
Pt was somewhat dyspneic at times but he accepted PO while demonstrating functional labial grading on utensils.

## 2017-03-11 NOTE — SWALLOW BEDSIDE ASSESSMENT ADULT - ADDITIONAL RECOMMENDATIONS
1) O2 should be provided when pt is eating at this time.    2) Defer MBS as pt appears clinically tolerant of suggested PO textures from an oropharyngeal swallowing stance and considering that oropharyngeal swallowing integrity is apt to fluctuate given his profile.

## 2017-03-11 NOTE — PROGRESS NOTE ADULT - SUBJECTIVE AND OBJECTIVE BOX
Patient is a 89y old  Male who presents with a chief complaint of sob (10 Mar 2017 10:19)      HPI:  89 yo male with h/o CAD come c/o SOB; he started feeling sick few days prior to adm but the past 24 hrs his breathing worsened, became anorectic and had nausea and vomiting; no CP no LOC; he is SOB, can't speak full sentences O2 sat 95% on 2 L, overall VS stable he will be adm to tele; lactate noted.  3/11- less SOB this AM- no chest pain  PMHX: CAD, CABG, STENTS, HYPOTHYROIDISM,  PSHX: CABG  FAM HX-NEG  SOCHX: NEG (10 Mar 2017 10:19)      PAST MEDICAL & SURGICAL HISTORY:  No significant past surgical history        MEDICATIONS  (STANDING):  furosemide   Injectable 40milliGRAM(s) IV Push every 12 hours  diltiazem   CD 120milliGRAM(s) Oral daily  digoxin     Tablet 0.125milliGRAM(s) Oral daily  warfarin 2.5milliGRAM(s) Oral daily  metoprolol succinate ER 50milliGRAM(s) Oral daily  levothyroxine  Oral Tab/Cap - Peds 75MICROGram(s) Oral daily  simvastatin 40milliGRAM(s) Oral at bedtime  insulin lispro (HumaLOG) corrective regimen sliding scale  SubCutaneous three times a day before meals  dextrose 5%. 1000milliLiter(s) IV Continuous <Continuous>  dextrose 50% Injectable 12.5Gram(s) IV Push once  dextrose 50% Injectable 25Gram(s) IV Push once  dextrose 50% Injectable 25Gram(s) IV Push once  piperacillin/tazobactam IVPB. 3.375Gram(s) IV Intermittent every 8 hours  ALBUTerol/ipratropium for Nebulization 3milliLiter(s) Nebulizer every 6 hours  ALBUTerol    90 MICROgram(s) HFA Inhaler 1Puff(s) Inhalation every 4 hours  tiotropium 18 MICROgram(s) Capsule 1Capsule(s) Inhalation daily    MEDICATIONS  (PRN):  dextrose Gel 1Dose(s) Oral once PRN Blood Glucose LESS THAN 70 milliGRAM(s)/deciliter  glucagon  Injectable 1milliGRAM(s) IntraMuscular once PRN Glucose LESS THAN 70 milligrams/deciliter  simethicone 125milliGRAM(s) Chew three times a day PRN Gas          Vital Signs Last 24 Hrs  T(C): 36.6, Max: 38.1 (03-10 @ 09:10)  T(F): 97.9, Max: 100.6 (03-10 @ 09:10)  HR: 81 (66 - 99)  BP: 145/71 (109/61 - 160/79)  BP(mean): --  RR: 22 (18 - 25)  SpO2: 96% (95% - 99%)    I&O's Summary    I & Os for current day (as of 11 Mar 2017 07:44)  =============================================  IN: 0 ml / OUT: 1300 ml / NET: -1300 ml      PHYSICAL EXAM  General Appearance:Mildly dyspneic  HEENT:   Neck:   Back:   Lungs: insp and exp rhonchi and exp wheezing  Heart: no def murmur audible  Abdomen:   Extremities: none  Skin:   Neurologic:       INTERPRETATION OF TELEMETRY:    ECG:tel- a fib  with occas vent pacing        LABS:                          12.5   6.7   )-----------( 110      ( 11 Mar 2017 03:28 )             35.1     11 Mar 2017 03:28    138    |  101    |  23     ----------------------------<  167    3.6     |  25     |  1.14     Ca    8.3        11 Mar 2017 03:28  Phos  2.7       10 Mar 2017 22:18  Mg     1.8       10 Mar 2017 22:18    TPro  7.8    /  Alb  3.8    /  TBili  1.2    /  DBili  x      /  AST  83     /  ALT  39     /  AlkPhos  79     10 Mar 2017 06:22    CARDIAC MARKERS ( 11 Mar 2017 03:28 )  0.163 ng/mL / x     / x     / x     / x      CARDIAC MARKERS ( 10 Mar 2017 22:18 )  0.167 ng/mL / x     / 446 U/L / x     / x      CARDIAC MARKERS ( 10 Mar 2017 06:22 )  0.165 ng/mL / x     / x     / x     / x            Pro BNP  [id number] 03-10 @ 13:34  D Dimer  -- 03-10 @ 13:34  Pro BNP  [id number] 03-10 @ 06:22  D Dimer  -- 03-10 @ 06:22    PT/INR - ( 10 Mar 2017 22:18 )   PT: 20.7 sec;   INR: 1.87 ratio         PTT - ( 10 Mar 2017 06:22 )  PTT:37.7 sec    ABG - ( 10 Mar 2017 20:51 )  pH: 7.39  /  pCO2: 40    /  pO2: 99    / HCO3: 23    / Base Excess: -1    /  SaO2: 97                    RADIOLOGY & ADDITIONAL STUDIES:  IMPRESSION:  1. Parainfluenza 3 infection  2.Bibasila pulmonary opacities. CHF vs pnsumonia.  3.CAD, s/p CABG 35 year ago.  Normal  LV systolic function  4. Afib, long term persistent. s/p PPM.  5.Hypertension  6.Diabetes mellitus  Plan: cont metoprolol, diltiazem, digoxin and furosemide. Patient now on broad spectrum antibiotics-  suggest  ID consultation to reassess antibiotic treatment  Will follow.

## 2017-03-11 NOTE — SWALLOW BEDSIDE ASSESSMENT ADULT - COMMENTS
Pt admitted to  with cough/SOB and N/V. Hosp course is notable for cough/dyspnea with +RSV/parainfluenza panel. Pt also with suspected CHF component that is contributing to his respiratory issues. This profile is superimposed upon a history of CAD s/p CABG, A-Fib s/p PPM, HTN and DM.

## 2017-03-11 NOTE — SWALLOW BEDSIDE ASSESSMENT ADULT - ASPIRATION PRECAUTIONS
yes/Maintain aspiration precautions as a conservative measure. Note that pt's profile places places him at a relatively heightened risk for episodic aspiration.  Though it is noted that NO behavioral aspiration signs were demonstrated on clinical exam.

## 2017-03-11 NOTE — PROGRESS NOTE ADULT - SUBJECTIVE AND OBJECTIVE BOX
89 yo male with h/o CAD come c/o SOB; he started feeling sick few days prior to adm but the past 24 hrs his breathing worsened, became anorectic and had nausea and vomiting; no CP no LOC. Adm with impression PNA, poss viral, can't exclude bacterial; required high flow o2 last night. Pt states he feels better today, able to speak full sentences, better color. Wife present, agrees  is improved.    Vital Signs Last 24 Hrs  T(C): 36.6, Max: 36.6 (03-10 @ 16:27)  T(F): 97.9, Max: 97.9 (03-10 @ 16:27)  HR: 72 (66 - 85)  BP: 145/71 (124/59 - 160/79)  BP(mean): --  RR: 22 (18 - 24)  SpO2: 96% (96% - 99%)        Physical Exam:    ENMT: nc/at, perrla, eomi  Neck:supple no jvd  Respiratory: decreased BS bilat, bibasilar crackles  Cardiovascular: s1s2 irreg  Gastrointestinal: soft, nt/nd, + BS  Genitourinary: deferred  Rectal: deferred  Extremities: no edema, clubbing cyanosis  Vascular: intact  Neurological: mot str 5/5 all extremities, no sens deficit  Skin: dry

## 2017-03-11 NOTE — SWALLOW BEDSIDE ASSESSMENT ADULT - SWALLOW EVAL: RECOMMENDED DIET
Suggest a soft texture diet with thin liquid consistencies as this diet texture best accommodates his oropharyngeal swallow profile at this time. Pt appears clinically tolerant of PO textures on this diet consistency and this diet type accommodates his Presbyphagia/Aerophagia.

## 2017-03-11 NOTE — SWALLOW BEDSIDE ASSESSMENT ADULT - SLP PRECAUTIONS/LIMITATIONS: VISION
within functional limits/based on limited interaction. within functional limits/Based on limited interaction.

## 2017-03-11 NOTE — PHYSICAL THERAPY INITIAL EVALUATION ADULT - PERTINENT HX OF CURRENT PROBLEM, REHAB EVAL
pt started feeling sick few days ago,then his breathing worsened,became anorectic and had N&V,o2 sat 95% on 2 lits

## 2017-03-11 NOTE — SWALLOW BEDSIDE ASSESSMENT ADULT - ASR SWALLOW LINGUAL MOBILITY
Effort was reduced when executing volitional lingual actions but no overt tongue focality was evident.

## 2017-03-11 NOTE — SWALLOW BEDSIDE ASSESSMENT ADULT - SLP GENERAL OBSERVATIONS
Pt seen at bedside. On encounter, pt appeared restless/uncomfortable and was SOB. Further, a moist non nutritive cough was produced at times and he sometimes produced open vowel vocalizations on exhalation. O2 was being provided. Pt was able to verbalize during structured communication tasks and during Q/A dyads. His utterances were produced without a primary motor speech or primary linguistic pathology. However, his breath support for phonation was somewhat reduced at times and his voice was breathy with decreased intensity. Moreover, his utterances tended to be somewhat brief which seemed to be a purposeful as the act of talking was effortful from a respiratory stance. Pt then asked about swallowing. He denied aspiration signs, odynophagia or dyspepsia. Though he stated that it is hard to catch his breath when orally processing foods at times. This difficulty is acute.

## 2017-03-11 NOTE — SWALLOW BEDSIDE ASSESSMENT ADULT - SLP PRECAUTIONS/LIMITATIONS: HEARING
based on limited interaction./within functional limits Based on limited interaction./within functional limits

## 2017-03-11 NOTE — PHYSICAL THERAPY INITIAL EVALUATION ADULT - CRITERIA FOR SKILLED THERAPEUTIC INTERVENTIONS
anticipated equipment needs at discharge/rehab potential/anticipated discharge recommendation/impairments found

## 2017-03-11 NOTE — CHART NOTE - NSCHARTNOTEFT_GEN_A_CORE
no sign of sbo on abd xray, confirmed with radiologist at [facility] as well  -simethicone 125mg po stat for gas and 3 times per day prn

## 2017-03-11 NOTE — PROGRESS NOTE ADULT - ASSESSMENT
89 yo male with h/o CAD come c/o SOB    * SOB diff dx: CHF exac sec to PNA ( viral vs bacterial); not PE pt on vka, theraputic ( supra)  - parainfl and RSV +  - c/w lasix IV bid  - nebs      * AF: resume vka, dig and cardizem for now

## 2017-03-12 LAB
ANION GAP SERPL CALC-SCNC: 11 MMOL/L — SIGNIFICANT CHANGE UP (ref 5–17)
ANION GAP SERPL CALC-SCNC: 12 MMOL/L — SIGNIFICANT CHANGE UP (ref 5–17)
BUN SERPL-MCNC: 37 MG/DL — HIGH (ref 7–23)
BUN SERPL-MCNC: 40 MG/DL — HIGH (ref 7–23)
CALCIUM SERPL-MCNC: 8.5 MG/DL — SIGNIFICANT CHANGE UP (ref 8.5–10.1)
CALCIUM SERPL-MCNC: 8.6 MG/DL — SIGNIFICANT CHANGE UP (ref 8.5–10.1)
CHLORIDE SERPL-SCNC: 98 MMOL/L — SIGNIFICANT CHANGE UP (ref 96–108)
CHLORIDE SERPL-SCNC: 98 MMOL/L — SIGNIFICANT CHANGE UP (ref 96–108)
CO2 SERPL-SCNC: 26 MMOL/L — SIGNIFICANT CHANGE UP (ref 22–31)
CO2 SERPL-SCNC: 27 MMOL/L — SIGNIFICANT CHANGE UP (ref 22–31)
CREAT SERPL-MCNC: 1.58 MG/DL — HIGH (ref 0.5–1.3)
CREAT SERPL-MCNC: 1.68 MG/DL — HIGH (ref 0.5–1.3)
GLUCOSE SERPL-MCNC: 179 MG/DL — HIGH (ref 70–99)
GLUCOSE SERPL-MCNC: 255 MG/DL — HIGH (ref 70–99)
HCT VFR BLD CALC: 34.8 % — LOW (ref 39–50)
HGB BLD-MCNC: 11.5 G/DL — LOW (ref 13–17)
INR BLD: 3.54 RATIO — HIGH (ref 0.88–1.16)
MCHC RBC-ENTMCNC: 32.9 PG — SIGNIFICANT CHANGE UP (ref 27–34)
MCHC RBC-ENTMCNC: 33.1 GM/DL — SIGNIFICANT CHANGE UP (ref 32–36)
MCV RBC AUTO: 99.4 FL — SIGNIFICANT CHANGE UP (ref 80–100)
PLATELET # BLD AUTO: 129 K/UL — LOW (ref 150–400)
POTASSIUM SERPL-MCNC: 3 MMOL/L — LOW (ref 3.5–5.3)
POTASSIUM SERPL-MCNC: 3.2 MMOL/L — LOW (ref 3.5–5.3)
POTASSIUM SERPL-SCNC: 3 MMOL/L — LOW (ref 3.5–5.3)
POTASSIUM SERPL-SCNC: 3.2 MMOL/L — LOW (ref 3.5–5.3)
PROTHROM AB SERPL-ACNC: 39.4 SEC — HIGH (ref 10–13.1)
RBC # BLD: 3.5 M/UL — LOW (ref 4.2–5.8)
RBC # FLD: 12.7 % — SIGNIFICANT CHANGE UP (ref 10.3–14.5)
SODIUM SERPL-SCNC: 135 MMOL/L — SIGNIFICANT CHANGE UP (ref 135–145)
SODIUM SERPL-SCNC: 137 MMOL/L — SIGNIFICANT CHANGE UP (ref 135–145)
WBC # BLD: 15.4 K/UL — HIGH (ref 3.8–10.5)
WBC # FLD AUTO: 15.4 K/UL — HIGH (ref 3.8–10.5)

## 2017-03-12 PROCEDURE — 93010 ELECTROCARDIOGRAM REPORT: CPT

## 2017-03-12 PROCEDURE — 71010: CPT | Mod: 26

## 2017-03-12 RX ORDER — POTASSIUM CHLORIDE 20 MEQ
20 PACKET (EA) ORAL
Qty: 0 | Refills: 0 | Status: COMPLETED | OUTPATIENT
Start: 2017-03-12 | End: 2017-03-12

## 2017-03-12 RX ADMIN — SIMVASTATIN 40 MILLIGRAM(S): 20 TABLET, FILM COATED ORAL at 21:42

## 2017-03-12 RX ADMIN — Medication 3 MILLILITER(S): at 08:00

## 2017-03-12 RX ADMIN — Medication 50 MILLIGRAM(S): at 05:24

## 2017-03-12 RX ADMIN — Medication 75 MICROGRAM(S): at 05:24

## 2017-03-12 RX ADMIN — Medication 3 MILLILITER(S): at 19:33

## 2017-03-12 RX ADMIN — PIPERACILLIN AND TAZOBACTAM 25 GRAM(S): 4; .5 INJECTION, POWDER, LYOPHILIZED, FOR SOLUTION INTRAVENOUS at 21:41

## 2017-03-12 RX ADMIN — Medication 120 MILLIGRAM(S): at 05:23

## 2017-03-12 RX ADMIN — Medication 20 MILLIEQUIVALENT(S): at 22:26

## 2017-03-12 RX ADMIN — Medication 3 MILLILITER(S): at 14:06

## 2017-03-12 RX ADMIN — Medication 40 MILLIGRAM(S): at 05:22

## 2017-03-12 RX ADMIN — PIPERACILLIN AND TAZOBACTAM 25 GRAM(S): 4; .5 INJECTION, POWDER, LYOPHILIZED, FOR SOLUTION INTRAVENOUS at 05:24

## 2017-03-12 RX ADMIN — Medication 3 MILLILITER(S): at 01:50

## 2017-03-12 RX ADMIN — Medication 20 MILLIGRAM(S): at 14:06

## 2017-03-12 RX ADMIN — PIPERACILLIN AND TAZOBACTAM 25 GRAM(S): 4; .5 INJECTION, POWDER, LYOPHILIZED, FOR SOLUTION INTRAVENOUS at 14:06

## 2017-03-12 RX ADMIN — Medication 3: at 14:19

## 2017-03-12 RX ADMIN — Medication 20 MILLIEQUIVALENT(S): at 14:03

## 2017-03-12 RX ADMIN — Medication 0.12 MILLIGRAM(S): at 05:23

## 2017-03-12 NOTE — PROGRESS NOTE ADULT - SUBJECTIVE AND OBJECTIVE BOX
Patient is a 89y old  Male who presents with a chief complaint of sob (10 Mar 2017 10:19)  HPI:  89 yo male with h/o CAD come c/o SOB; he started feeling sick few days prior to adm but the past 24 hrs his breathing worsened, became anorectic and had nausea and vomiting; no CP no LOC; he is SOB, can't speak full sentences O2 sat 95% on 2 L, overall VS stable he will be adm to tele; lactate noted.    PMHX: CAD, CABG, STENTS, HYPOTHYROIDISM,  PSHX: CABG, PPM  FAM HX-NEG  SOCHX: NEG (10 Mar 2017 10:19)      Followup HPI:  3/11- less SOB this AM- no chest pain    3/12- Feels improved. Cough and hoarseness improved. Much less breathless.        Review of symptoms:  Negative except for as noted in today's HPI.      MEDICATIONS  (STANDING):  furosemide   Injectable 40milliGRAM(s) IV Push every 12 hours  diltiazem   CD 120milliGRAM(s) Oral daily  digoxin     Tablet 0.125milliGRAM(s) Oral daily  warfarin 2.5milliGRAM(s) Oral daily  metoprolol succinate ER 50milliGRAM(s) Oral daily  levothyroxine  Oral Tab/Cap - Peds 75MICROGram(s) Oral daily  simvastatin 40milliGRAM(s) Oral at bedtime  insulin lispro (HumaLOG) corrective regimen sliding scale  SubCutaneous three times a day before meals  dextrose 5%. 1000milliLiter(s) IV Continuous <Continuous>  dextrose 50% Injectable 12.5Gram(s) IV Push once  dextrose 50% Injectable 25Gram(s) IV Push once  dextrose 50% Injectable 25Gram(s) IV Push once  piperacillin/tazobactam IVPB. 3.375Gram(s) IV Intermittent every 8 hours  ALBUTerol/ipratropium for Nebulization 3milliLiter(s) Nebulizer every 6 hours  ALBUTerol    90 MICROgram(s) HFA Inhaler 1Puff(s) Inhalation every 4 hours  tiotropium 18 MICROgram(s) Capsule 1Capsule(s) Inhalation daily    MEDICATIONS  (PRN):  dextrose Gel 1Dose(s) Oral once PRN Blood Glucose LESS THAN 70 milliGRAM(s)/deciliter  glucagon  Injectable 1milliGRAM(s) IntraMuscular once PRN Glucose LESS THAN 70 milligrams/deciliter  simethicone 120milliGRAM(s) Chew three times a day PRN Gas          Vital Signs Last 24 Hrs  T(C): 37.1, Max: 37.1 (03-12 @ 05:18)  T(F): 98.8, Max: 98.8 (03-12 @ 05:18)  HR: 77 (72 - 83)  BP: 112/45 (109/56 - 132/50)  BP(mean): --  RR: 20 (20 - 21)  SpO2: 98% (97% - 100%)    I&O's Summary    I & Os for current day (as of 12 Mar 2017 09:37)  =============================================  IN: 100 ml / OUT: 275 ml / NET: -175 ml      PHYSICAL EXAM  General Appearance mildly hoarse voice, comfortable  HEENT:   Neck: no JVD  Lungs: bibasilar rhonchi  Heart: S1 and S2 normal, no murmur or gallop  Abdomen: soft and nontender  Extremities: no edema  Neurologic: no focal abnormality    TTE: LV EF 50-55%.    INTERPRETATION OF TELEMETRY: AFib with occasional demand pacing, occasional VPCs.    ECG :atrial fibrillation with occasional VPC, VR 79 BPM, ST-T abnormality consistent with LV strain and or ischemia.    LABS:                          11.5   15.4  )-----------( 129      ( 12 Mar 2017 06:19 )             34.8     12 Mar 2017 06:19    137    |  98     |  37     ----------------------------<  179    3.2     |  27     |  1.58     Ca    8.6        12 Mar 2017 06:19  Phos  2.7       10 Mar 2017 22:18  Mg     1.8       10 Mar 2017 22:18      CARDIAC MARKERS ( 11 Mar 2017 09:25 )  0.122 ng/mL / x     / x     / x     / x      CARDIAC MARKERS ( 11 Mar 2017 03:28 )  0.163 ng/mL / x     / x     / x     / x      CARDIAC MARKERS ( 10 Mar 2017 22:18 )  0.167 ng/mL / x     / 446 U/L / x     / x            Pro BNP  [id number] 03-10 @ 13:34  D Dimer  -- 03-10 @ 13:34  Pro BNP  [id number] 03-10 @ 06:22  D Dimer  -- 03-10 @ 06:22    PT/INR - ( 10 Mar 2017 22:18 )   PT: 20.7 sec;   INR: 1.87 ratio             ABG - ( 10 Mar 2017 20:51 )  pH: 7.39  /  pCO2: 40    /  pO2: 99    / HCO3: 23    / Base Excess: -1    /  SaO2: 97        IMPRESSION:  1. Parainfluenza 3 infection. Clinically improved.   2.Bibasilar pulmonary opacities. CHF vs pneumonia.  3.CAD, s/p CABG 35 year ago.  Normal  LV systolic function. No evidence of acute MI. Mild troponin elevations due to demand ischemia.   4. Afib, long term persistent. s/p PPM.  5.Hypertension  6.Diabetes mellitus  7. Acute prerenal azotemia and hypokalemia  due to IV furosemide therapy.  Plan: cont metoprolol, diltiazem, digoxin, warfarin, simvastatin. Stop furosemide. Add KCL.  Patient  on broad spectrum antibiotics-  suggest  ID consultation to reassess antibiotic treatment  Will follow

## 2017-03-12 NOTE — PROGRESS NOTE ADULT - SUBJECTIVE AND OBJECTIVE BOX
89 yo male with h/o CAD come c/o SOB; he started feeling sick few days prior to adm but the past 24 hrs his breathing worsened, became anorectic and had nausea and vomiting; no CP no LOC. Adm with impression PNA, poss viral, can't exclude bacterial; required high flow o2 yesterday, better today, on NC; no c/o speaks full sentences    Vital Signs Last 24 Hrs  T(C): 37.1, Max: 37.1 (03-12 @ 05:18)  T(F): 98.8, Max: 98.8 (03-12 @ 05:18)  HR: 72 (72 - 83)  BP: 114/48 (109/56 - 114/48)  BP(mean): --  RR: 18 (18 - 20)  SpO2: 98% (98% - 100%)        Physical Exam:    ENMT: nc/at, perrla, eomi  Neck:supple no jvd  Respiratory: decreased BS bilat, bibasilar crackles; + wheezes today  Cardiovascular: s1s2 irreg  Gastrointestinal: soft, nt/nd, + BS  Genitourinary: deferred  Rectal: deferred  Extremities: no edema, clubbing cyanosis  Vascular: intact  Neurological: mot str 5/5 all extremities, no sens deficit  Skin: dry

## 2017-03-12 NOTE — PROGRESS NOTE ADULT - ASSESSMENT
89 yo male with h/o CAD come c/o SOB    * SOB diff dx: CHF exac sec to PNA ( viral vs bacterial); not PE pt on vka, theraputic ( supra): overall improved clinically  - parainfl and RSV +  - off lasix; add low dose steroids for the wheezing  - repeat cxr today  - nebs  - leukocytosis and prerenal azotemia noted      * AF: resume vka, dig and cardizem

## 2017-03-13 LAB
ANION GAP SERPL CALC-SCNC: 13 MMOL/L — SIGNIFICANT CHANGE UP (ref 5–17)
BUN SERPL-MCNC: 50 MG/DL — HIGH (ref 7–23)
CALCIUM SERPL-MCNC: 8.8 MG/DL — SIGNIFICANT CHANGE UP (ref 8.5–10.1)
CHLORIDE SERPL-SCNC: 100 MMOL/L — SIGNIFICANT CHANGE UP (ref 96–108)
CO2 SERPL-SCNC: 25 MMOL/L — SIGNIFICANT CHANGE UP (ref 22–31)
CREAT SERPL-MCNC: 1.44 MG/DL — HIGH (ref 0.5–1.3)
GLUCOSE SERPL-MCNC: 205 MG/DL — HIGH (ref 70–99)
HCT VFR BLD CALC: 33.8 % — LOW (ref 39–50)
HGB BLD-MCNC: 11.9 G/DL — LOW (ref 13–17)
INR BLD: 3.04 RATIO — HIGH (ref 0.88–1.16)
MCHC RBC-ENTMCNC: 35.2 GM/DL — SIGNIFICANT CHANGE UP (ref 32–36)
MCHC RBC-ENTMCNC: 35.3 PG — HIGH (ref 27–34)
MCV RBC AUTO: 100.1 FL — HIGH (ref 80–100)
PLATELET # BLD AUTO: 142 K/UL — LOW (ref 150–400)
POTASSIUM SERPL-MCNC: 3.1 MMOL/L — LOW (ref 3.5–5.3)
POTASSIUM SERPL-SCNC: 3.1 MMOL/L — LOW (ref 3.5–5.3)
PROTHROM AB SERPL-ACNC: 33.8 SEC — HIGH (ref 10–13.1)
RBC # BLD: 3.37 M/UL — LOW (ref 4.2–5.8)
RBC # FLD: 12.6 % — SIGNIFICANT CHANGE UP (ref 10.3–14.5)
SODIUM SERPL-SCNC: 138 MMOL/L — SIGNIFICANT CHANGE UP (ref 135–145)
WBC # BLD: 14.8 K/UL — HIGH (ref 3.8–10.5)
WBC # FLD AUTO: 14.8 K/UL — HIGH (ref 3.8–10.5)

## 2017-03-13 PROCEDURE — 71010: CPT | Mod: 26

## 2017-03-13 RX ORDER — WARFARIN SODIUM 2.5 MG/1
2.5 TABLET ORAL DAILY
Qty: 0 | Refills: 0 | Status: DISCONTINUED | OUTPATIENT
Start: 2017-03-13 | End: 2017-03-17

## 2017-03-13 RX ORDER — POTASSIUM CHLORIDE 20 MEQ
20 PACKET (EA) ORAL DAILY
Qty: 0 | Refills: 0 | Status: DISCONTINUED | OUTPATIENT
Start: 2017-03-13 | End: 2017-03-17

## 2017-03-13 RX ADMIN — Medication 3 MILLILITER(S): at 03:09

## 2017-03-13 RX ADMIN — Medication 2: at 10:45

## 2017-03-13 RX ADMIN — Medication 3 MILLILITER(S): at 20:17

## 2017-03-13 RX ADMIN — Medication 20 MILLIGRAM(S): at 06:37

## 2017-03-13 RX ADMIN — Medication 20 MILLIEQUIVALENT(S): at 11:57

## 2017-03-13 RX ADMIN — Medication 3: at 11:57

## 2017-03-13 RX ADMIN — Medication 1: at 17:35

## 2017-03-13 RX ADMIN — Medication 75 MICROGRAM(S): at 06:37

## 2017-03-13 RX ADMIN — Medication 50 MILLIGRAM(S): at 06:37

## 2017-03-13 RX ADMIN — Medication 0.12 MILLIGRAM(S): at 06:37

## 2017-03-13 RX ADMIN — PIPERACILLIN AND TAZOBACTAM 25 GRAM(S): 4; .5 INJECTION, POWDER, LYOPHILIZED, FOR SOLUTION INTRAVENOUS at 13:38

## 2017-03-13 RX ADMIN — SIMVASTATIN 40 MILLIGRAM(S): 20 TABLET, FILM COATED ORAL at 21:40

## 2017-03-13 RX ADMIN — Medication 120 MILLIGRAM(S): at 06:37

## 2017-03-13 RX ADMIN — PIPERACILLIN AND TAZOBACTAM 25 GRAM(S): 4; .5 INJECTION, POWDER, LYOPHILIZED, FOR SOLUTION INTRAVENOUS at 06:44

## 2017-03-13 NOTE — PROGRESS NOTE ADULT - SUBJECTIVE AND OBJECTIVE BOX
89 yo male with h/o CAD come c/o SOB; he started feeling sick few days prior to adm but the past 24 hrs his breathing worsened, became anorectic and had nausea and vomiting; no CP no LOC. Adm with impression PNA, poss viral, can't exclude bacterial; required high flow o2 yesterday, better today, on NC; no c/o speaks full sentences; clinically improved    Vital Signs Last 24 Hrs  T(C): 36.3, Max: 36.4 (03-13 @ 06:11)  T(F): 97.3, Max: 97.5 (03-13 @ 06:11)  HR: 74 (74 - 85)  BP: 119/56 (114/51 - 139/65)  BP(mean): --  RR: --  SpO2: 98% (92% - 99%)        Physical Exam:    ENMT: nc/at, perrla, eomi  Neck:supple no jvd  Respiratory: decreased BS bilat, bibasilar crackles; + ronchi  Cardiovascular: s1s2 irreg  Gastrointestinal: soft, nt/nd, + BS  Genitourinary: deferred  Rectal: deferred  Extremities: no edema, clubbing cyanosis  Vascular: intact  Neurological: mot str 5/5 all extremities, no sens deficit  Skin: dry

## 2017-03-13 NOTE — PROGRESS NOTE ADULT - SUBJECTIVE AND OBJECTIVE BOX
Patient is a 89y old  Male who presents with a chief complaint of sob (10 Mar 2017 10:19)      HPI:  89 yo male with h/o CAD come c/o SOB; he started feeling sick few days prior to adm but the past 24 hrs his breathing worsened, became anorectic and had nausea and vomiting; no CP no LOC; he is SOB, can't speak full sentences O2 sat 95% on 2 L, overall VS stable he will be adm to tele; lactate noted.    PMHX: CAD, CABG, STENTS, HYPOTHYROIDISM,  PSHX: CABG, PPM  FAM HX-NEG  SOCHX: NEG (10 Mar 2017 10:19)      Followup HPI:  3/11- less SOB this AM- no chest pain    3/12- Feels improved. Cough and hoarseness improved. Much less breathless.    3/13- Still coughing but overall improved.    Review of symptoms:  Negative except for as noted in today's HPI.      MEDICATIONS  (STANDING):  diltiazem   CD 120milliGRAM(s) Oral daily  digoxin     Tablet 0.125milliGRAM(s) Oral daily  warfarin 2.5milliGRAM(s) Oral daily  metoprolol succinate ER 50milliGRAM(s) Oral daily  levothyroxine  Oral Tab/Cap - Peds 75MICROGram(s) Oral daily  simvastatin 40milliGRAM(s) Oral at bedtime  insulin lispro (HumaLOG) corrective regimen sliding scale  SubCutaneous three times a day before meals  dextrose 5%. 1000milliLiter(s) IV Continuous <Continuous>  dextrose 50% Injectable 12.5Gram(s) IV Push once  dextrose 50% Injectable 25Gram(s) IV Push once  dextrose 50% Injectable 25Gram(s) IV Push once  piperacillin/tazobactam IVPB. 3.375Gram(s) IV Intermittent every 8 hours  ALBUTerol/ipratropium for Nebulization 3milliLiter(s) Nebulizer every 6 hours  ALBUTerol    90 MICROgram(s) HFA Inhaler 1Puff(s) Inhalation every 4 hours  tiotropium 18 MICROgram(s) Capsule 1Capsule(s) Inhalation daily  predniSONE   Tablet 20milliGRAM(s) Oral daily    MEDICATIONS  (PRN):  dextrose Gel 1Dose(s) Oral once PRN Blood Glucose LESS THAN 70 milliGRAM(s)/deciliter  glucagon  Injectable 1milliGRAM(s) IntraMuscular once PRN Glucose LESS THAN 70 milligrams/deciliter  simethicone 120milliGRAM(s) Chew three times a day PRN Gas          Vital Signs Last 24 Hrs  T(C): 36.4, Max: 36.4 (03-13 @ 06:11)  T(F): 97.5, Max: 97.5 (03-13 @ 06:11)  HR: 78 (72 - 85)  BP: 139/65 (114/48 - 139/65)  BP(mean): --  RR: 18 (18 - 18)  SpO2: 99% (92% - 99%)    I&O's Summary      PHYSICAL EXAM   General Appearance comfortable  HEENT:   Neck: no JVD  Lungs: bibasilar rhonchi  Heart: S1 and S2 normal, no murmur or gallop  Abdomen: soft and nontender  Extremities: no edema  Neurologic: no focal abnormality      INTERPRETATION OF TELEMETRY: Atrial fib, occasional demand pacing, occasional VPCs, one 7 beat run of wide complex tach.    ECG:    LABS:                          11.9   14.8  )-----------( 142      ( 13 Mar 2017 05:50 )             33.8     13 Mar 2017 05:50    138    |  100    |  50     ----------------------------<  205    3.1     |  25     |  1.44     Ca    8.8        13 Mar 2017 05:50      CARDIAC MARKERS ( 11 Mar 2017 09:25 )  0.122 ng/mL / x     / x     / x     / x            Pro BNP  [id number] 03-10 @ 13:34  D Dimer  -- 03-10 @ 13:34  Pro BNP  [id number] 03-10 @ 06:22  D Dimer  -- 03-10 @ 06:22    PT/INR - ( 13 Mar 2017 05:50 )   PT: 33.8 sec;   INR: 3.04 ratio         IMPRESSION:  1. Parainfluenza 3 infection. Clinically improved.   2.Bibasilar pulmonary opacities. CHF vs pneumonia.  3.CAD, s/p CABG 35 year ago.  Normal  LV systolic function. No evidence of acute MI. Mild troponin elevations due to demand ischemia.   4. Afib, long term persistent. s/p PPM.  5.Hypertension  6.Diabetes mellitus  7. Acute prerenal azotemia and hypokalemia  due to IV furosemide therapy.    Plan: cont metoprolol, diltiazem, digoxin, warfarin, simvastatin. Continue kcl repletion and remain off furosemide for now.  Patient  on broad spectrum antibiotics-  suggest  ID consultation to reassess antibiotic treatment  Will follow

## 2017-03-13 NOTE — CDI QUERY NOTE - NSCDIOTHERTXTBX_GEN_ALL_CORE_HH
1) Pneumonia documented. Being treated with IV pipercillin q8h. Please clarify the type of pneumonia being treated ?  ie.. Suspected aspiration pneumonia ? Likely GNR pneumonia? Possible MRSA pna ? Other ?     2) Severe Sepsis documented on H+P. Please clarify if Sepsis has been ruled -in ? ruled- out ? resolving ?  lactate= 3.8, WBC= 8.5, platelets= 112, Afebrile, BP= 114/48,heart rate=81, RR=18    3) CHF documented. Lasix IV x 1 given in ER. BNP= 27,380, echo= EF of 50-60% with PFO noted.  Please clarify the type ( diastolic, systolic, other) and the acuity of the CHF ( Acute? Acute on Chronic? Chronic?, Other?)    4) Please clarify patient's respiratory status on admission. Unable to speak in full sentences, SOB, O2 sat 95% on 2l O2 and 99% on 50% vm, IV SM 125mg given stat,, albuterol treatment given. ie.. Acute respiratory failure ? acute on chronic respiratoery failure ? No evidence of respiratory failure ? other ?

## 2017-03-13 NOTE — PROGRESS NOTE ADULT - ASSESSMENT
89 yo male with h/o CAD come c/o SOB    * SOB diff dx: CHF exac sec to PNA ( viral vs bacterial); not PE pt on vka, theraputic ( supra): overall improved clinically  - parainfl and RSV +  - off lasix; add low dose steroids for the wheezing  - repeated cxr noted  - nebs  - leukocytosis and prerenal azotemia noted: improved      * AF: resume vka, dig and cardizem 89 yo male with h/o CAD come c/o SOB    * SOB diff dx: CHF exac sec to PNA ( viral vs bacterial); not PE pt on vka, theraputic ( supra): overall improved clinically  - parainfl and RSV +  - off lasix; add low dose steroids for the wheezing  - repeated cxr noted  - nebs  - leukocytosis and prerenal azotemia noted: improved      * AF: resume vka, dig and cardizem     * Sepsis ruled out; less likely GNR PNA dc abx; s/p diast HF exacerbation- resolved

## 2017-03-14 LAB
ANION GAP SERPL CALC-SCNC: 11 MMOL/L — SIGNIFICANT CHANGE UP (ref 5–17)
BUN SERPL-MCNC: 57 MG/DL — HIGH (ref 7–23)
CALCIUM SERPL-MCNC: 8.9 MG/DL — SIGNIFICANT CHANGE UP (ref 8.5–10.1)
CHLORIDE SERPL-SCNC: 101 MMOL/L — SIGNIFICANT CHANGE UP (ref 96–108)
CO2 SERPL-SCNC: 27 MMOL/L — SIGNIFICANT CHANGE UP (ref 22–31)
CREAT SERPL-MCNC: 1.33 MG/DL — HIGH (ref 0.5–1.3)
GLUCOSE SERPL-MCNC: 162 MG/DL — HIGH (ref 70–99)
HCT VFR BLD CALC: 35.3 % — LOW (ref 39–50)
HGB BLD-MCNC: 11.8 G/DL — LOW (ref 13–17)
INR BLD: 3.05 RATIO — HIGH (ref 0.88–1.16)
MCHC RBC-ENTMCNC: 33.5 PG — SIGNIFICANT CHANGE UP (ref 27–34)
MCHC RBC-ENTMCNC: 33.6 GM/DL — SIGNIFICANT CHANGE UP (ref 32–36)
MCV RBC AUTO: 99.8 FL — SIGNIFICANT CHANGE UP (ref 80–100)
PLATELET # BLD AUTO: 180 K/UL — SIGNIFICANT CHANGE UP (ref 150–400)
POTASSIUM SERPL-MCNC: 3.5 MMOL/L — SIGNIFICANT CHANGE UP (ref 3.5–5.3)
POTASSIUM SERPL-SCNC: 3.5 MMOL/L — SIGNIFICANT CHANGE UP (ref 3.5–5.3)
PROTHROM AB SERPL-ACNC: 33.9 SEC — HIGH (ref 10–13.1)
RBC # BLD: 3.54 M/UL — LOW (ref 4.2–5.8)
RBC # FLD: 12.7 % — SIGNIFICANT CHANGE UP (ref 10.3–14.5)
SODIUM SERPL-SCNC: 139 MMOL/L — SIGNIFICANT CHANGE UP (ref 135–145)
WBC # BLD: 13 K/UL — HIGH (ref 3.8–10.5)
WBC # FLD AUTO: 13 K/UL — HIGH (ref 3.8–10.5)

## 2017-03-14 RX ADMIN — Medication 20 MILLIGRAM(S): at 06:20

## 2017-03-14 RX ADMIN — Medication 3 MILLILITER(S): at 19:33

## 2017-03-14 RX ADMIN — Medication 1: at 09:36

## 2017-03-14 RX ADMIN — Medication 50 MILLIGRAM(S): at 06:20

## 2017-03-14 RX ADMIN — Medication 20 MILLIEQUIVALENT(S): at 12:39

## 2017-03-14 RX ADMIN — Medication 2: at 12:41

## 2017-03-14 RX ADMIN — Medication 3 MILLILITER(S): at 12:06

## 2017-03-14 RX ADMIN — Medication 120 MILLIGRAM(S): at 06:20

## 2017-03-14 RX ADMIN — Medication 3 MILLILITER(S): at 02:24

## 2017-03-14 RX ADMIN — Medication 2: at 18:11

## 2017-03-14 RX ADMIN — SIMVASTATIN 40 MILLIGRAM(S): 20 TABLET, FILM COATED ORAL at 22:35

## 2017-03-14 RX ADMIN — Medication 75 MICROGRAM(S): at 06:20

## 2017-03-14 RX ADMIN — Medication 0.12 MILLIGRAM(S): at 06:20

## 2017-03-14 NOTE — PROGRESS NOTE ADULT - SUBJECTIVE AND OBJECTIVE BOX
89 yo male with h/o CAD come c/o SOB; he started feeling sick few days prior to adm but the past 24 hrs his breathing worsened, became anorectic and had nausea and vomiting; no CP no LOC. Adm with impression PNA, poss viral, can't exclude bacterial; required high flow o2 yesterday, better today, on NC; no c/o speaks full sentences; clinically improved    Vital Signs Last 24 Hrs  T(C): 36.3, Max: 36.4   T(F): 97.3, Max: 97.5   HR: 74 (74 - 85)  BP: 119/56 (114/51 - 139/65)  BP(mean): --  RR: --  SpO2: 98% (92% - 99%)        Physical Exam:    ENMT: nc/at, perrla, eomi  Neck:supple no jvd  Respiratory: decreased BS bilat, bibasilar crackles; + ronchi  Cardiovascular: s1s2 irreg  Gastrointestinal: soft, nt/nd, + BS  Genitourinary: deferred  Rectal: deferred  Extremities: no edema, clubbing cyanosis  Vascular: intact  Neurological: mot str 5/5 all extremities, no sens deficit  Skin: dry

## 2017-03-14 NOTE — PROGRESS NOTE ADULT - ASSESSMENT
89 yo male with h/o CAD come c/o SOB    * SOB diff dx: CHF exac sec to PNA ( viral vs bacterial); not PE pt on vka, theraputic ( supra): overall improved clinically  - parainfl and RSV +  - off lasix; add low dose steroids for the wheezing  - repeated cxr noted  - nebs  - leukocytosis and prerenal azotemia noted: improved      * AF: resume vka, dig and cardizem     * Sepsis ruled out; less likely GNR PNA dc abx; s/p diast HF exacerbation- resolved    dc planning

## 2017-03-14 NOTE — PROGRESS NOTE ADULT - SUBJECTIVE AND OBJECTIVE BOX
Patient is a 89y old  Male who presents with a chief complaint of sob (10 Mar 2017 10:19)      HPI:  89 yo male with h/o CAD come c/o SOB; he started feeling sick few days prior to adm but the past 24 hrs his breathing worsened, became anorectic and had nausea and vomiting; no CP no LOC; he is SOB, can't speak full sentences O2 sat 95% on 2 L, overall VS stable he will be adm to tele; lactate noted.  3/14 cough persists, out in chair   PMHX: CAD, CABG, STENTS, HYPOTHYROIDISM,  PSHX: CABG  FAM HX-NEG  SOCHX: NEG (10 Mar 2017 10:19)      PAST MEDICAL & SURGICAL HISTORY:  No significant past surgical history        MEDICATIONS  (STANDING):  diltiazem   CD 120milliGRAM(s) Oral daily  digoxin     Tablet 0.125milliGRAM(s) Oral daily  metoprolol succinate ER 50milliGRAM(s) Oral daily  levothyroxine  Oral Tab/Cap - Peds 75MICROGram(s) Oral daily  simvastatin 40milliGRAM(s) Oral at bedtime  insulin lispro (HumaLOG) corrective regimen sliding scale  SubCutaneous three times a day before meals  dextrose 5%. 1000milliLiter(s) IV Continuous <Continuous>  dextrose 50% Injectable 12.5Gram(s) IV Push once  dextrose 50% Injectable 25Gram(s) IV Push once  dextrose 50% Injectable 25Gram(s) IV Push once  ALBUTerol/ipratropium for Nebulization 3milliLiter(s) Nebulizer every 6 hours  ALBUTerol    90 MICROgram(s) HFA Inhaler 1Puff(s) Inhalation every 4 hours  tiotropium 18 MICROgram(s) Capsule 1Capsule(s) Inhalation daily  predniSONE   Tablet 20milliGRAM(s) Oral daily  potassium chloride    Tablet ER 20milliEquivalent(s) Oral daily  warfarin 2.5milliGRAM(s) Oral daily    MEDICATIONS  (PRN):  dextrose Gel 1Dose(s) Oral once PRN Blood Glucose LESS THAN 70 milliGRAM(s)/deciliter  glucagon  Injectable 1milliGRAM(s) IntraMuscular once PRN Glucose LESS THAN 70 milligrams/deciliter  simethicone 120milliGRAM(s) Chew three times a day PRN Gas          Vital Signs Last 24 Hrs  T(C): 36.3, Max: 36.3 (03-13 @ 10:04)  T(F): 97.3, Max: 97.3 (03-13 @ 10:04)  HR: 75 (72 - 82)  BP: 126/68 (119/56 - 126/68)  BP(mean): --  RR: 18 (18 - 18)  SpO2: 97% (97% - 100%)    I&O's Summary    I & Os for current day (as of 14 Mar 2017 07:13)  =============================================  IN: 0 ml / OUT: 575 ml / NET: -575 ml        PHYSICAL EXAM   General Appearance comfortable  HEENT:   Neck: no JVD  Lungs: bibasilar rhonchi no wheeze  Heart: S1 and S2 normal, no murmur or gallop  Abdomen: soft and nontender  Extremities: no edema  Neurologic: no focal abnormality    INTERPRETATION OF TELEMETRY:    ECG:tel- atrial fib with vent pacing at times, occas PVC        LABS:                          11.8   13.0  )-----------( 180      ( 14 Mar 2017 05:37 )             35.3     14 Mar 2017 05:37    139    |  101    |  57     ----------------------------<  162    3.5     |  27     |  1.33     Ca    8.9        14 Mar 2017 05:37            Pro BNP  [id number] 03-10 @ 13:34  D Dimer  -- 03-10 @ 13:34  Pro BNP  [id number] 03-10 @ 06:22  D Dimer  -- 03-10 @ 06:22    PT/INR - ( 13 Mar 2017 05:50 )   PT: 33.8 sec;   INR: 3.04 ratio                   RADIOLOGY & ADDITIONAL STUDIES:       IMPRESSION:  1. Parainfluenza 3 infection. Clinically improved.   2.Bibasilar pulmonary opacities. CHF vs pneumonia.  3.CAD, s/p CABG 35 year ago.  Normal  LV systolic function. No evidence of acute MI. Mild troponin elevations due to demand ischemia.   4. Afib, long term persistent. s/p PPM.  5.Hypertension  6.Diabetes mellitus  7. Acute prerenal azotemia and hypokalemia  due to IV furosemide therapy.Creatinine improved        Plan: cont metoprolol, diltiazem, digoxin, warfarin, simvastatin. Continue kcl repletion and remain off furosemide for now.  Patient  on broad spectrum antibiotics  Will follow

## 2017-03-15 LAB
CULTURE RESULTS: SIGNIFICANT CHANGE UP
CULTURE RESULTS: SIGNIFICANT CHANGE UP
DIGOXIN SERPL-MCNC: 1.03 NG/ML — SIGNIFICANT CHANGE UP (ref 0.8–2)
INR BLD: 2.84 RATIO — HIGH (ref 0.88–1.16)
PROTHROM AB SERPL-ACNC: 31.6 SEC — HIGH (ref 10–13.1)
SPECIMEN SOURCE: SIGNIFICANT CHANGE UP
SPECIMEN SOURCE: SIGNIFICANT CHANGE UP

## 2017-03-15 RX ORDER — POLYETHYLENE GLYCOL 3350 17 G/17G
17 POWDER, FOR SOLUTION ORAL DAILY
Qty: 0 | Refills: 0 | Status: DISCONTINUED | OUTPATIENT
Start: 2017-03-15 | End: 2017-03-17

## 2017-03-15 RX ADMIN — Medication 75 MICROGRAM(S): at 06:30

## 2017-03-15 RX ADMIN — Medication 1: at 17:30

## 2017-03-15 RX ADMIN — WARFARIN SODIUM 2.5 MILLIGRAM(S): 2.5 TABLET ORAL at 21:47

## 2017-03-15 RX ADMIN — Medication 3 MILLILITER(S): at 14:52

## 2017-03-15 RX ADMIN — Medication 3 MILLILITER(S): at 02:00

## 2017-03-15 RX ADMIN — Medication 3 MILLILITER(S): at 19:31

## 2017-03-15 RX ADMIN — Medication 0.12 MILLIGRAM(S): at 07:40

## 2017-03-15 RX ADMIN — SIMVASTATIN 40 MILLIGRAM(S): 20 TABLET, FILM COATED ORAL at 21:47

## 2017-03-15 RX ADMIN — Medication 3 MILLILITER(S): at 08:18

## 2017-03-15 RX ADMIN — Medication 50 MILLIGRAM(S): at 06:30

## 2017-03-15 RX ADMIN — Medication 20 MILLIEQUIVALENT(S): at 11:03

## 2017-03-15 RX ADMIN — Medication 2: at 12:18

## 2017-03-15 RX ADMIN — Medication 20 MILLIGRAM(S): at 06:30

## 2017-03-15 RX ADMIN — POLYETHYLENE GLYCOL 3350 17 GRAM(S): 17 POWDER, FOR SOLUTION ORAL at 11:03

## 2017-03-15 RX ADMIN — Medication 120 MILLIGRAM(S): at 06:30

## 2017-03-15 NOTE — PROGRESS NOTE ADULT - SUBJECTIVE AND OBJECTIVE BOX
Patient is a 89y old  Male who presents with a chief complaint of sob (10 Mar 2017 10:19)      Followup HPI:    3/15- cough improved. Complains of difficulty swallowing and constipation.   No chest pain or dyspnea.       Review of symptoms:  Negative except for as noted in today's HPI.      MEDICATIONS  (STANDING):  diltiazem   CD 120milliGRAM(s) Oral daily  digoxin     Tablet 0.125milliGRAM(s) Oral daily  metoprolol succinate ER 50milliGRAM(s) Oral daily  levothyroxine  Oral Tab/Cap - Peds 75MICROGram(s) Oral daily  simvastatin 40milliGRAM(s) Oral at bedtime  insulin lispro (HumaLOG) corrective regimen sliding scale  SubCutaneous three times a day before meals  dextrose 5%. 1000milliLiter(s) IV Continuous <Continuous>  dextrose 50% Injectable 12.5Gram(s) IV Push once  dextrose 50% Injectable 25Gram(s) IV Push once  dextrose 50% Injectable 25Gram(s) IV Push once  ALBUTerol/ipratropium for Nebulization 3milliLiter(s) Nebulizer every 6 hours  ALBUTerol    90 MICROgram(s) HFA Inhaler 1Puff(s) Inhalation every 4 hours  tiotropium 18 MICROgram(s) Capsule 1Capsule(s) Inhalation daily  predniSONE   Tablet 20milliGRAM(s) Oral daily  potassium chloride    Tablet ER 20milliEquivalent(s) Oral daily  warfarin 2.5milliGRAM(s) Oral daily    MEDICATIONS  (PRN):  dextrose Gel 1Dose(s) Oral once PRN Blood Glucose LESS THAN 70 milliGRAM(s)/deciliter  glucagon  Injectable 1milliGRAM(s) IntraMuscular once PRN Glucose LESS THAN 70 milligrams/deciliter  simethicone 120milliGRAM(s) Chew three times a day PRN Gas          Vital Signs Last 24 Hrs  T(C): 36.3, Max: 36.5 (03-14 @ 21:00)  T(F): 97.4, Max: 97.7 (03-14 @ 21:00)  HR: 70 (68 - 81)  BP: 142/69 (114/56 - 142/69)  BP(mean): --  RR: 16 (16 - 19)  SpO2: 100% (98% - 100%)    I&O's Summary    I & Os for current day (as of 15 Mar 2017 09:29)  =============================================  IN: 0 ml / OUT: 200 ml / NET: -200 ml      PHYSICAL EXAM  General Appearance:comfortable  HEENT:   Neck: no jvd  Lungs: scant bibasilar rhonchi  Heart: S1 and S2 normal. No murmur  Abdomen: soft and nontender  Extremities: no edema  Neurologic: no focal abnormality.          LABS:                          11.8   13.0  )-----------( 180      ( 14 Mar 2017 05:37 )             35.3     14 Mar 2017 05:37    139    |  101    |  57     ----------------------------<  162    3.5     |  27     |  1.33     Ca    8.9        14 Mar 2017 05:37            Pro BNP  [id number] 03-10 @ 13:34  D Dimer  -- 03-10 @ 13:34  Pro BNP  [id number] 03-10 @ 06:22  D Dimer  -- 03-10 @ 06:22    PT/INR - ( 15 Mar 2017 05:57 )   PT: 31.6 sec;   INR: 2.84 ratio       IMPRESSION:  1. Parainfluenza 3 infection. Clinically improved.   2.Bibasilar pulmonary opacities. Improved.  3.CAD, s/p CABG 35 year ago.  Normal  LV systolic function. No evidence of acute MI. Mild troponin elevations due to demand ischemia. Stable.  4. Afib, long term persistent. s/p PPM.  5.Hypertension  6.Diabetes mellitus  7.Renal insufficiency, stable.  8.Swallowing dysfunction- may be due to posterior pharyngeal inflammation due to parainfluenza.     Plan: cont metoprolol, diltiazem, digoxin, warfarin, simvastatin. Consider ENT evaluation.  Will follow as needed.

## 2017-03-15 NOTE — PROGRESS NOTE ADULT - ASSESSMENT
89 yo male with h/o CAD come c/o SOB    * SOB diff dx: CHF exac sec to PNA ( viral vs bacterial); not PE pt on vka, theraputic ( supra): overall improved clinically  - parainfl and RSV +  - off lasix low dose steroids for the wheezing  - repeated cxr noted  - nebs  - leukocytosis and prerenal azotemia noted: improved      * AF: resume vka, dig and cardizem     * Sepsis ruled out; less likely GNR PNA dc abx; s/p diast HF exacerbation- resolved    dc planning in am

## 2017-03-16 LAB
ANION GAP SERPL CALC-SCNC: 10 MMOL/L — SIGNIFICANT CHANGE UP (ref 5–17)
BUN SERPL-MCNC: 36 MG/DL — HIGH (ref 7–23)
CALCIUM SERPL-MCNC: 9 MG/DL — SIGNIFICANT CHANGE UP (ref 8.5–10.1)
CHLORIDE SERPL-SCNC: 104 MMOL/L — SIGNIFICANT CHANGE UP (ref 96–108)
CO2 SERPL-SCNC: 28 MMOL/L — SIGNIFICANT CHANGE UP (ref 22–31)
CREAT SERPL-MCNC: 1.09 MG/DL — SIGNIFICANT CHANGE UP (ref 0.5–1.3)
GLUCOSE SERPL-MCNC: 184 MG/DL — HIGH (ref 70–99)
INR BLD: 2.76 RATIO — HIGH (ref 0.88–1.16)
POTASSIUM SERPL-MCNC: 4.6 MMOL/L — SIGNIFICANT CHANGE UP (ref 3.5–5.3)
POTASSIUM SERPL-SCNC: 4.6 MMOL/L — SIGNIFICANT CHANGE UP (ref 3.5–5.3)
PROTHROM AB SERPL-ACNC: 30.7 SEC — HIGH (ref 10–13.1)
SODIUM SERPL-SCNC: 142 MMOL/L — SIGNIFICANT CHANGE UP (ref 135–145)

## 2017-03-16 RX ORDER — PANTOPRAZOLE SODIUM 20 MG/1
40 TABLET, DELAYED RELEASE ORAL DAILY
Qty: 0 | Refills: 0 | Status: DISCONTINUED | OUTPATIENT
Start: 2017-03-16 | End: 2017-03-16

## 2017-03-16 RX ORDER — LANSOPRAZOLE 15 MG/1
30 CAPSULE, DELAYED RELEASE ORAL DAILY
Qty: 0 | Refills: 0 | Status: DISCONTINUED | OUTPATIENT
Start: 2017-03-16 | End: 2017-03-17

## 2017-03-16 RX ADMIN — POLYETHYLENE GLYCOL 3350 17 GRAM(S): 17 POWDER, FOR SOLUTION ORAL at 11:41

## 2017-03-16 RX ADMIN — Medication 50 MILLIGRAM(S): at 05:22

## 2017-03-16 RX ADMIN — Medication 20 MILLIEQUIVALENT(S): at 18:23

## 2017-03-16 RX ADMIN — Medication 3 MILLILITER(S): at 08:27

## 2017-03-16 RX ADMIN — LANSOPRAZOLE 30 MILLIGRAM(S): 15 CAPSULE, DELAYED RELEASE ORAL at 18:25

## 2017-03-16 RX ADMIN — Medication 3 MILLILITER(S): at 14:05

## 2017-03-16 RX ADMIN — Medication 3 MILLILITER(S): at 19:48

## 2017-03-16 RX ADMIN — Medication 120 MILLIGRAM(S): at 05:22

## 2017-03-16 RX ADMIN — Medication 20 MILLIGRAM(S): at 05:22

## 2017-03-16 RX ADMIN — Medication 0.12 MILLIGRAM(S): at 05:22

## 2017-03-16 RX ADMIN — WARFARIN SODIUM 2.5 MILLIGRAM(S): 2.5 TABLET ORAL at 21:06

## 2017-03-16 RX ADMIN — Medication 1: at 11:40

## 2017-03-16 RX ADMIN — SIMVASTATIN 40 MILLIGRAM(S): 20 TABLET, FILM COATED ORAL at 21:06

## 2017-03-16 RX ADMIN — Medication 1: at 18:24

## 2017-03-16 RX ADMIN — Medication 75 MICROGRAM(S): at 05:22

## 2017-03-16 NOTE — DISCHARGE NOTE ADULT - PATIENT PORTAL LINK FT
“You can access the [facility] Patient Portal, offered by [facility], by registering with the following website: [email or web address]”

## 2017-03-16 NOTE — PROGRESS NOTE ADULT - ASSESSMENT
89 yo male with h/o CAD come c/o SOB    * SOB diff dx: CHF exac sec to PNA ( viral vs bacterial); not PE pt on vka, theraputic ( supra): overall improved clinically  - parainfl and RSV +  - off lasix low dose steroids for the wheezing- improved  - repeated cxr noted  - nebs  - pt had epig pain, wheezing this am now with no resp symptoms; wife is elderly and sick herself; needs home nebs he will have some attacks while at home  - leukocytosis and prerenal azotemia noted: improved      * AF: resume vka, dig and cardizem     * Sepsis ruled out; less likely GNR PNA dc abx; s/p diast HF exacerbation- resolved    dc planning in am

## 2017-03-16 NOTE — DISCHARGE NOTE ADULT - CARE PROVIDER_API CALL
Elan Magdaleno (MD), Cardiovascular Disease; Critical Care Medicine; Internal Medicine  [address] [place] [address]  Phone: [phone]  Fax: [phone]

## 2017-03-16 NOTE — PROGRESS NOTE ADULT - SUBJECTIVE AND OBJECTIVE BOX
89 yo male with h/o CAD come c/o SOB; he started feeling sick few days prior to adm but the past 24 hrs his breathing worsened, became anorectic and had nausea and vomiting; no CP no LOC. Adm with impression PNA, poss viral, can't exclude bacterial; required high flow o2 yesterday, better today, on NC; no c/o speaks full sentences; clinically improved    Pt had epigastric pain earlier, better now after maalox; not SOB        Physical Exam:    ENMT: nc/at, perrla, eomi  Neck:supple no jvd  Respiratory: decreased BS bilat, bibasilar crackles; + ronchi  Cardiovascular: s1s2 irreg  Gastrointestinal: soft, nt/nd, + BS  Genitourinary: deferred  Rectal: deferred  Extremities: no edema, clubbing cyanosis  Vascular: intact  Neurological: mot str 5/5 all extremities, no sens deficit  Skin: dry

## 2017-03-16 NOTE — DISCHARGE NOTE ADULT - HOSPITAL COURSE
89 yo male with h/o CAD come c/o SOB    * SOB diff dx: CHF exac sec to PNA ( viral vs bacterial); not PE pt on vka, theraputic ( supra): overall improved clinically  - parainfl and RSV +  -  taper off low dose steroids for the wheezing- improved  - repeated cxr noted  - pt had epig pain, wheezing yesterday, now with no resp symptoms; wife is elderly and sick herself; d/c on spiriva and albuterol inh  - leukocytosis and prerenal azotemia noted: improved    * AF: resume vka, dig and cardizem     * Sepsis ruled out; less likely GNR PNA dc abx; s/p diast HF exacerbation- resolved    dispo:  d/c home today

## 2017-03-16 NOTE — DISCHARGE NOTE ADULT - CARE PLAN
Principal Discharge DX:	Pneumonia due to infectious organism, unspecified laterality, unspecified part of lung  Goal:	treated  Instructions for follow-up, activity and diet:	f/u with pcp

## 2017-03-16 NOTE — PROGRESS NOTE ADULT - SUBJECTIVE AND OBJECTIVE BOX
Patient is a 89y old  Male who presents with a chief complaint of sob (10 Mar 2017 10:19)      HPI:  89 yo male with h/o CAD come c/o SOB; he started feeling sick few days prior to adm but the past 24 hrs his breathing worsened, became anorectic and had nausea and vomiting; no CP no LOC; he is SOB, can't speak full sentences O2 sat 95% on 2 L, overall VS stable he will be adm to tele; lactate noted.  3/16- now in no distress- had difficulty in raising sputum earlier today  PMHX: CAD, CABG, STENTS, HYPOTHYROIDISM,  PSHX: CABG  FAM HX-NEG  SOCHX: NEG (10 Mar 2017 10:19)      PAST MEDICAL & SURGICAL HISTORY:  No significant past surgical history        MEDICATIONS  (STANDING):  diltiazem   CD 120milliGRAM(s) Oral daily  digoxin     Tablet 0.125milliGRAM(s) Oral daily  metoprolol succinate ER 50milliGRAM(s) Oral daily  levothyroxine  Oral Tab/Cap - Peds 75MICROGram(s) Oral daily  simvastatin 40milliGRAM(s) Oral at bedtime  insulin lispro (HumaLOG) corrective regimen sliding scale  SubCutaneous three times a day before meals  dextrose 5%. 1000milliLiter(s) IV Continuous <Continuous>  dextrose 50% Injectable 12.5Gram(s) IV Push once  dextrose 50% Injectable 25Gram(s) IV Push once  dextrose 50% Injectable 25Gram(s) IV Push once  ALBUTerol/ipratropium for Nebulization 3milliLiter(s) Nebulizer every 6 hours  ALBUTerol    90 MICROgram(s) HFA Inhaler 1Puff(s) Inhalation every 4 hours  tiotropium 18 MICROgram(s) Capsule 1Capsule(s) Inhalation daily  predniSONE   Tablet 20milliGRAM(s) Oral daily  potassium chloride    Tablet ER 20milliEquivalent(s) Oral daily  warfarin 2.5milliGRAM(s) Oral daily  polyethylene glycol 3350 17Gram(s) Oral daily    MEDICATIONS  (PRN):  dextrose Gel 1Dose(s) Oral once PRN Blood Glucose LESS THAN 70 milliGRAM(s)/deciliter  glucagon  Injectable 1milliGRAM(s) IntraMuscular once PRN Glucose LESS THAN 70 milligrams/deciliter  simethicone 120milliGRAM(s) Chew three times a day PRN Gas          Vital Signs Last 24 Hrs  T(C): 36.3, Max: 36.4 (03-15 @ 10:30)  T(F): 97.4, Max: 97.5 (03-15 @ 10:30)  HR: 70 (70 - 80)  BP: 136/56 (122/62 - 141/68)  BP(mean): --  RR: 16 (16 - 20)  SpO2: 97% (96% - 99%)    I&O's Summary    I & Os for current day (as of 16 Mar 2017 07:56)  =============================================  IN: 240 ml / OUT: 150 ml / NET: 90 ml      PHYSICAL EXAM  General Appearance:  HEENT:   Neck: no JVD  Back:   Lungs: scattered bilbas rhonchi  Heart: no murmur  Abdomen: soft  Extremities: no edema  Skin:   Neurologic:       INTERPRETATION OF TELEMETRY:   IMPRESSION:  1. Parainfluenza 3 infection. Clinically improved.   2.Bibasilar pulmonary opacities. Improved.  3.CAD, s/p CABG 35 year ago.  Normal  LV systolic function. No evidence of acute MI. Mild troponin elevations due to demand ischemia. Stable.  4. Afib, long term persistent. s/p PPM.  5.Hypertension  6.Diabetes mellitus  7.Renal insufficiency, stable.  8.Swallowing dysfunction- may be due to posterior pharyngeal inflammation due to parainfluenza.     Plan: cont metoprolol, diltiazem, digoxin, warfarin, simvastatin. Consider ENT evaluation.  Will follow as needed. Hope to discharge in near future

## 2017-03-16 NOTE — DISCHARGE NOTE ADULT - MEDICATION SUMMARY - MEDICATIONS TO TAKE
I will START or STAY ON the medications listed below when I get home from the hospital:    predniSONE 5 mg oral tablet  -- 3 tab(s) by mouth once a day for 3 days, then  2 tabs by mouth once a day for 3  days, then  1  tab by mouth once a day for 3  -- Indication: For bronchitis    Cartia  mg/24 hours oral capsule, extended release  -- 1 cap(s) by mouth once a day  -- Indication: For a fib    digoxin 125 mcg (0.125 mg) oral tablet  -- 1 tab(s) by mouth once a day  -- Indication: For a fib    warfarin 2.5 mg oral tablet  -- 1 tab(s) by mouth once a day  -- Indication: For a fib    glipiZIDE 2.5 mg oral tablet, extended release  -- 1 tab(s) by mouth once a day  -- Indication: For Dm    simvastatin 40 mg oral tablet  -- 1 tab(s) by mouth once a day (at bedtime)  -- Indication: For hld    metoprolol succinate 50 mg oral tablet, extended release  -- 1 tab(s) by mouth once a day  -- Indication: For htn    albuterol 90 mcg/inh inhalation aerosol  -- 1 puff(s) inhaled every 4 hours  -- Indication: For bronchospasm    tiotropium 18 mcg inhalation capsule  -- 1 cap(s) inhaled once a day  -- Indication: For bronchospasm    Lasix 40 mg oral tablet  -- 1 tab(s) by mouth once a day  -- Indication: For chf    potassium chloride 20 mEq oral granule, extended release  --  by mouth   -- Indication: For Suppl    levothyroxine 75 mcg (0.075 mg) oral capsule  -- 1 cap(s) by mouth once a day  -- Indication: For hypothyroid    Calcium 600+D Plus Minerals oral tablet, chewable  -- 1 tab(s) by mouth once a day  -- Indication: For Suppl

## 2017-03-17 VITALS
OXYGEN SATURATION: 96 % | DIASTOLIC BLOOD PRESSURE: 71 MMHG | RESPIRATION RATE: 17 BRPM | TEMPERATURE: 97 F | SYSTOLIC BLOOD PRESSURE: 148 MMHG | HEART RATE: 89 BPM

## 2017-03-17 LAB
ANION GAP SERPL CALC-SCNC: 8 MMOL/L — SIGNIFICANT CHANGE UP (ref 5–17)
BUN SERPL-MCNC: 30 MG/DL — HIGH (ref 7–23)
CALCIUM SERPL-MCNC: 9.1 MG/DL — SIGNIFICANT CHANGE UP (ref 8.5–10.1)
CHLORIDE SERPL-SCNC: 105 MMOL/L — SIGNIFICANT CHANGE UP (ref 96–108)
CO2 SERPL-SCNC: 30 MMOL/L — SIGNIFICANT CHANGE UP (ref 22–31)
CREAT SERPL-MCNC: 1.04 MG/DL — SIGNIFICANT CHANGE UP (ref 0.5–1.3)
GLUCOSE SERPL-MCNC: 131 MG/DL — HIGH (ref 70–99)
HCT VFR BLD CALC: 40.1 % — SIGNIFICANT CHANGE UP (ref 39–50)
HGB BLD-MCNC: 13.3 G/DL — SIGNIFICANT CHANGE UP (ref 13–17)
INR BLD: 2.77 RATIO — HIGH (ref 0.88–1.16)
MCHC RBC-ENTMCNC: 33.1 GM/DL — SIGNIFICANT CHANGE UP (ref 32–36)
MCHC RBC-ENTMCNC: 33.3 PG — SIGNIFICANT CHANGE UP (ref 27–34)
MCV RBC AUTO: 100.6 FL — HIGH (ref 80–100)
PLATELET # BLD AUTO: 270 K/UL — SIGNIFICANT CHANGE UP (ref 150–400)
POTASSIUM SERPL-MCNC: 4.3 MMOL/L — SIGNIFICANT CHANGE UP (ref 3.5–5.3)
POTASSIUM SERPL-SCNC: 4.3 MMOL/L — SIGNIFICANT CHANGE UP (ref 3.5–5.3)
PROTHROM AB SERPL-ACNC: 30.8 SEC — HIGH (ref 10–13.1)
RBC # BLD: 3.99 M/UL — LOW (ref 4.2–5.8)
RBC # FLD: 12.5 % — SIGNIFICANT CHANGE UP (ref 10.3–14.5)
SODIUM SERPL-SCNC: 143 MMOL/L — SIGNIFICANT CHANGE UP (ref 135–145)
WBC # BLD: 14.2 K/UL — HIGH (ref 3.8–10.5)
WBC # FLD AUTO: 14.2 K/UL — HIGH (ref 3.8–10.5)

## 2017-03-17 RX ORDER — ALBUTEROL 90 UG/1
1 AEROSOL, METERED ORAL
Qty: 1 | Refills: 0 | OUTPATIENT
Start: 2017-03-17 | End: 2017-03-27

## 2017-03-17 RX ORDER — TIOTROPIUM BROMIDE 18 UG/1
1 CAPSULE ORAL; RESPIRATORY (INHALATION)
Qty: 14 | Refills: 0 | OUTPATIENT
Start: 2017-03-17 | End: 2017-03-31

## 2017-03-17 RX ADMIN — Medication 75 MICROGRAM(S): at 05:07

## 2017-03-17 RX ADMIN — Medication 15 MILLIGRAM(S): at 05:07

## 2017-03-17 RX ADMIN — Medication 3 MILLILITER(S): at 08:35

## 2017-03-17 RX ADMIN — LANSOPRAZOLE 30 MILLIGRAM(S): 15 CAPSULE, DELAYED RELEASE ORAL at 12:22

## 2017-03-17 RX ADMIN — POLYETHYLENE GLYCOL 3350 17 GRAM(S): 17 POWDER, FOR SOLUTION ORAL at 12:18

## 2017-03-17 RX ADMIN — Medication 50 MILLIGRAM(S): at 05:07

## 2017-03-17 RX ADMIN — Medication 20 MILLIEQUIVALENT(S): at 12:19

## 2017-03-17 RX ADMIN — Medication 120 MILLIGRAM(S): at 05:07

## 2017-03-17 RX ADMIN — Medication 1: at 12:15

## 2017-03-17 RX ADMIN — Medication 0.12 MILLIGRAM(S): at 05:07

## 2017-03-17 NOTE — PROGRESS NOTE ADULT - PROVIDER SPECIALTY LIST ADULT
Cardiology
Hospitalist
Cardiology
Hospitalist

## 2017-03-17 NOTE — PROGRESS NOTE ADULT - SUBJECTIVE AND OBJECTIVE BOX
Patient is a 89y old  Male who presents with a chief complaint of sob (16 Mar 2017 15:32)      HPI:  89 yo male with h/o CAD come c/o SOB; he started feeling sick few days prior to adm but the past 24 hrs his breathing worsened, became anorectic and had nausea and vomiting; no CP no LOC; he is SOB, can't speak full sentences O2 sat 95% on 2 L, overall VS stable he will be adm to tele; lactate noted.  3/17 less SOB today  PMHX: CAD, CABG, STENTS, HYPOTHYROIDISM,  PSHX: CABG  FAM HX-NEG  SOCHX: NEG (10 Mar 2017 10:19)      PAST MEDICAL & SURGICAL HISTORY:  No significant past surgical history        MEDICATIONS  (STANDING):  diltiazem   CD 120milliGRAM(s) Oral daily  digoxin     Tablet 0.125milliGRAM(s) Oral daily  metoprolol succinate ER 50milliGRAM(s) Oral daily  levothyroxine  Oral Tab/Cap - Peds 75MICROGram(s) Oral daily  simvastatin 40milliGRAM(s) Oral at bedtime  insulin lispro (HumaLOG) corrective regimen sliding scale  SubCutaneous three times a day before meals  dextrose 5%. 1000milliLiter(s) IV Continuous <Continuous>  dextrose 50% Injectable 12.5Gram(s) IV Push once  dextrose 50% Injectable 25Gram(s) IV Push once  dextrose 50% Injectable 25Gram(s) IV Push once  ALBUTerol/ipratropium for Nebulization 3milliLiter(s) Nebulizer every 6 hours  ALBUTerol    90 MICROgram(s) HFA Inhaler 1Puff(s) Inhalation every 4 hours  tiotropium 18 MICROgram(s) Capsule 1Capsule(s) Inhalation daily  potassium chloride    Tablet ER 20milliEquivalent(s) Oral daily  warfarin 2.5milliGRAM(s) Oral daily  polyethylene glycol 3350 17Gram(s) Oral daily  predniSONE   Tablet 15milliGRAM(s) Oral daily  lansoprazole Dispersible Tablet 30milliGRAM(s) Oral daily    MEDICATIONS  (PRN):  dextrose Gel 1Dose(s) Oral once PRN Blood Glucose LESS THAN 70 milliGRAM(s)/deciliter  glucagon  Injectable 1milliGRAM(s) IntraMuscular once PRN Glucose LESS THAN 70 milligrams/deciliter  simethicone 120milliGRAM(s) Chew three times a day PRN Gas          Vital Signs Last 24 Hrs  T(C): 36.4, Max: 36.5 (03-16 @ 11:27)  T(F): 97.5, Max: 97.7 (03-16 @ 11:27)  HR: 79 (73 - 87)  BP: 164/85 (105/59 - 164/85)  BP(mean): --  RR: 16 (16 - 18)  SpO2: 99% (95% - 99%)    I&O's Summary    I & Os for current day (as of 17 Mar 2017 08:47)  =============================================  IN: 120 ml / OUT: 0 ml / NET: 120 ml      PHYSICAL EXAM  General Appearance:comfortable  HEENT:   Neck:   Back:   Lungs: bibasilar rhonci  Heart: no mumur  Abdomen:   Extremities: no edema  Skin:   Neurologic:       INTERPRETATION OF TELEMETRY:    ECG:        LABS:                          13.3   14.2  )-----------( 270      ( 17 Mar 2017 06:51 )             40.1     17 Mar 2017 06:51    143    |  105    |  30     ----------------------------<  131    4.3     |  30     |  1.04     Ca    9.1        17 Mar 2017 06:51            Pro BNP  [id number] 03-10 @ 13:34  D Dimer  -- 03-10 @ 13:34    PT/INR - ( 17 Mar 2017 06:51 )   PT: 30.8 sec;   INR: 2.77 ratio                   RADIOLOGY & ADDITIONAL STUDIES:   IMPRESSION:  1. Parainfluenza 3 infection. Clinically improved.   2.Bibasilar pulmonary opacities. Improved.  3.CAD, s/p CABG 35 year ago.  Normal  LV systolic function. No evidence of acute MI. Mild troponin elevations due to demand ischemia. Stable.  4. Afib, long term persistent. s/p PPM.  5.Hypertension  6.Diabetes mellitus  7.Renal insufficiency, stable.  8.Swallowing dysfunction- may be due to posterior pharyngeal inflammation due to parainfluenza.     Plan: cont metoprolol, diltiazem, digoxin, warfarin, simvastatin.   . Hope to discharge in near future

## 2017-03-20 DIAGNOSIS — N28.9 DISORDER OF KIDNEY AND URETER, UNSPECIFIED: ICD-10-CM

## 2017-03-20 DIAGNOSIS — Z95.5 PRESENCE OF CORONARY ANGIOPLASTY IMPLANT AND GRAFT: ICD-10-CM

## 2017-03-20 DIAGNOSIS — E03.9 HYPOTHYROIDISM, UNSPECIFIED: ICD-10-CM

## 2017-03-20 DIAGNOSIS — Z79.01 LONG TERM (CURRENT) USE OF ANTICOAGULANTS: ICD-10-CM

## 2017-03-20 DIAGNOSIS — I50.31 ACUTE DIASTOLIC (CONGESTIVE) HEART FAILURE: ICD-10-CM

## 2017-03-20 DIAGNOSIS — I24.8 OTHER FORMS OF ACUTE ISCHEMIC HEART DISEASE: ICD-10-CM

## 2017-03-20 DIAGNOSIS — R06.02 SHORTNESS OF BREATH: ICD-10-CM

## 2017-03-20 DIAGNOSIS — E11.9 TYPE 2 DIABETES MELLITUS WITHOUT COMPLICATIONS: ICD-10-CM

## 2017-03-20 DIAGNOSIS — I48.2 CHRONIC ATRIAL FIBRILLATION: ICD-10-CM

## 2017-03-20 DIAGNOSIS — I25.10 ATHEROSCLEROTIC HEART DISEASE OF NATIVE CORONARY ARTERY WITHOUT ANGINA PECTORIS: ICD-10-CM

## 2017-03-20 DIAGNOSIS — J18.9 PNEUMONIA, UNSPECIFIED ORGANISM: ICD-10-CM

## 2017-03-20 DIAGNOSIS — Z79.899 OTHER LONG TERM (CURRENT) DRUG THERAPY: ICD-10-CM

## 2017-03-20 DIAGNOSIS — B34.8 OTHER VIRAL INFECTIONS OF UNSPECIFIED SITE: ICD-10-CM

## 2017-03-20 DIAGNOSIS — I10 ESSENTIAL (PRIMARY) HYPERTENSION: ICD-10-CM

## 2017-05-03 ENCOUNTER — APPOINTMENT (OUTPATIENT)
Dept: ELECTROPHYSIOLOGY | Facility: CLINIC | Age: 82
End: 2017-05-03

## 2017-05-03 VITALS
WEIGHT: 160 LBS | DIASTOLIC BLOOD PRESSURE: 74 MMHG | BODY MASS INDEX: 23.7 KG/M2 | HEIGHT: 69 IN | SYSTOLIC BLOOD PRESSURE: 119 MMHG | HEART RATE: 89 BPM

## 2017-08-17 ENCOUNTER — APPOINTMENT (OUTPATIENT)
Dept: ELECTROPHYSIOLOGY | Facility: CLINIC | Age: 82
End: 2017-08-17

## 2017-08-25 ENCOUNTER — APPOINTMENT (OUTPATIENT)
Dept: ELECTROPHYSIOLOGY | Facility: CLINIC | Age: 82
End: 2017-08-25
Payer: MEDICARE

## 2017-08-25 VITALS
BODY MASS INDEX: 23.7 KG/M2 | WEIGHT: 160 LBS | HEIGHT: 69 IN | HEART RATE: 77 BPM | SYSTOLIC BLOOD PRESSURE: 124 MMHG | DIASTOLIC BLOOD PRESSURE: 63 MMHG

## 2017-08-25 PROCEDURE — 93279 PRGRMG DEV EVAL PM/LDLS PM: CPT

## 2017-11-10 ENCOUNTER — EMERGENCY (EMERGENCY)
Facility: HOSPITAL | Age: 82
LOS: 0 days | Discharge: HOME | End: 2017-11-10
Attending: EMERGENCY MEDICINE | Admitting: EMERGENCY MEDICINE
Payer: MEDICARE

## 2017-11-10 VITALS
OXYGEN SATURATION: 99 % | DIASTOLIC BLOOD PRESSURE: 64 MMHG | HEART RATE: 64 BPM | TEMPERATURE: 98 F | RESPIRATION RATE: 18 BRPM | SYSTOLIC BLOOD PRESSURE: 158 MMHG

## 2017-11-10 VITALS — HEIGHT: 68 IN | WEIGHT: 179.9 LBS
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
## 2017-11-10 DIAGNOSIS — Z95.5 PRESENCE OF CORONARY ANGIOPLASTY IMPLANT AND GRAFT: Chronic | ICD-10-CM

## 2017-11-10 DIAGNOSIS — I67.89 OTHER CEREBROVASCULAR DISEASE: ICD-10-CM

## 2017-11-10 DIAGNOSIS — I48.91 UNSPECIFIED ATRIAL FIBRILLATION: ICD-10-CM

## 2017-11-10 DIAGNOSIS — Z95.0 PRESENCE OF CARDIAC PACEMAKER: ICD-10-CM

## 2017-11-10 DIAGNOSIS — M50.30 OTHER CERVICAL DISC DEGENERATION, UNSPECIFIED CERVICAL REGION: ICD-10-CM

## 2017-11-10 DIAGNOSIS — Z79.01 LONG TERM (CURRENT) USE OF ANTICOAGULANTS: ICD-10-CM

## 2017-11-10 DIAGNOSIS — Z98.61 CORONARY ANGIOPLASTY STATUS: ICD-10-CM

## 2017-11-10 DIAGNOSIS — S60.511A ABRASION OF RIGHT HAND, INITIAL ENCOUNTER: ICD-10-CM
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
## 2017-11-10 DIAGNOSIS — S09.90XA UNSPECIFIED INJURY OF HEAD, INITIAL ENCOUNTER: ICD-10-CM

## 2017-11-10 DIAGNOSIS — R94.31 ABNORMAL ELECTROCARDIOGRAM [ECG] [EKG]: ICD-10-CM

## 2017-11-10 DIAGNOSIS — S60.512A ABRASION OF LEFT HAND, INITIAL ENCOUNTER: ICD-10-CM

## 2017-11-10 DIAGNOSIS — Z95.1 PRESENCE OF AORTOCORONARY BYPASS GRAFT: ICD-10-CM

## 2017-11-10 LAB
ALBUMIN SERPL ELPH-MCNC: 3.8 G/DL — SIGNIFICANT CHANGE UP (ref 3.3–5)
ALP SERPL-CCNC: 71 U/L — SIGNIFICANT CHANGE UP (ref 40–120)
ALT FLD-CCNC: 29 U/L — SIGNIFICANT CHANGE UP (ref 12–78)
ANION GAP SERPL CALC-SCNC: 5 MMOL/L — SIGNIFICANT CHANGE UP (ref 5–17)
AST SERPL-CCNC: 37 U/L — SIGNIFICANT CHANGE UP (ref 15–37)
BASOPHILS # BLD AUTO: 0.1 K/UL — SIGNIFICANT CHANGE UP (ref 0–0.2)
BASOPHILS NFR BLD AUTO: 1.4 % — SIGNIFICANT CHANGE UP (ref 0–2)
BILIRUB SERPL-MCNC: 0.6 MG/DL — SIGNIFICANT CHANGE UP (ref 0.2–1.2)
BUN SERPL-MCNC: 31 MG/DL — HIGH (ref 7–23)
CALCIUM SERPL-MCNC: 8.8 MG/DL — SIGNIFICANT CHANGE UP (ref 8.5–10.1)
CHLORIDE SERPL-SCNC: 106 MMOL/L — SIGNIFICANT CHANGE UP (ref 96–108)
CO2 SERPL-SCNC: 28 MMOL/L — SIGNIFICANT CHANGE UP (ref 22–31)
CREAT SERPL-MCNC: 1.29 MG/DL — SIGNIFICANT CHANGE UP (ref 0.5–1.3)
EOSINOPHIL # BLD AUTO: 0.2 K/UL — SIGNIFICANT CHANGE UP (ref 0–0.5)
EOSINOPHIL NFR BLD AUTO: 2.5 % — SIGNIFICANT CHANGE UP (ref 0–6)
GLUCOSE BLDC GLUCOMTR-MCNC: 93 MG/DL — SIGNIFICANT CHANGE UP (ref 70–99)
GLUCOSE SERPL-MCNC: 82 MG/DL — SIGNIFICANT CHANGE UP (ref 70–99)
HCT VFR BLD CALC: 36.6 % — LOW (ref 39–50)
HGB BLD-MCNC: 12.3 G/DL — LOW (ref 13–17)
INR BLD: 2.6 RATIO — HIGH (ref 0.88–1.16)
LYMPHOCYTES # BLD AUTO: 2.2 K/UL — SIGNIFICANT CHANGE UP (ref 1–3.3)
LYMPHOCYTES # BLD AUTO: 26.8 % — SIGNIFICANT CHANGE UP (ref 13–44)
MCHC RBC-ENTMCNC: 33.5 GM/DL — SIGNIFICANT CHANGE UP (ref 32–36)
MCHC RBC-ENTMCNC: 33.9 PG — SIGNIFICANT CHANGE UP (ref 27–34)
MCV RBC AUTO: 101.4 FL — HIGH (ref 80–100)
MONOCYTES # BLD AUTO: 0.9 K/UL — SIGNIFICANT CHANGE UP (ref 0–0.9)
MONOCYTES NFR BLD AUTO: 10.9 % — SIGNIFICANT CHANGE UP (ref 2–14)
NEUTROPHILS # BLD AUTO: 4.9 K/UL — SIGNIFICANT CHANGE UP (ref 1.8–7.4)
NEUTROPHILS NFR BLD AUTO: 58.3 % — SIGNIFICANT CHANGE UP (ref 43–77)
PLATELET # BLD AUTO: 140 K/UL — LOW (ref 150–400)
POTASSIUM SERPL-MCNC: 4.3 MMOL/L — SIGNIFICANT CHANGE UP (ref 3.5–5.3)
POTASSIUM SERPL-SCNC: 4.3 MMOL/L — SIGNIFICANT CHANGE UP (ref 3.5–5.3)
PROT SERPL-MCNC: 7.7 GM/DL — SIGNIFICANT CHANGE UP (ref 6–8.3)
PROTHROM AB SERPL-ACNC: 28.6 SEC — HIGH (ref 9.8–12.7)
RBC # BLD: 3.61 M/UL — LOW (ref 4.2–5.8)
RBC # FLD: 12.9 % — SIGNIFICANT CHANGE UP (ref 10.3–14.5)
SODIUM SERPL-SCNC: 139 MMOL/L — SIGNIFICANT CHANGE UP (ref 135–145)
WBC # BLD: 8.4 K/UL — SIGNIFICANT CHANGE UP (ref 3.8–10.5)
WBC # FLD AUTO: 8.4 K/UL — SIGNIFICANT CHANGE UP (ref 3.8–10.5)

## 2017-11-10 PROCEDURE — 99284 EMERGENCY DEPT VISIT MOD MDM: CPT

## 2017-11-10 PROCEDURE — 71010: CPT | Mod: 26

## 2017-11-10 PROCEDURE — 73562 X-RAY EXAM OF KNEE 3: CPT | Mod: 26,RT

## 2017-11-10 PROCEDURE — 93010 ELECTROCARDIOGRAM REPORT: CPT

## 2017-11-10 PROCEDURE — 76377 3D RENDER W/INTRP POSTPROCES: CPT | Mod: 26

## 2017-11-10 PROCEDURE — 72125 CT NECK SPINE W/O DYE: CPT | Mod: 26

## 2017-11-10 PROCEDURE — 70486 CT MAXILLOFACIAL W/O DYE: CPT | Mod: 26

## 2017-11-10 PROCEDURE — 73130 X-RAY EXAM OF HAND: CPT | Mod: 26,LT

## 2017-11-10 PROCEDURE — 72190 X-RAY EXAM OF PELVIS: CPT | Mod: 26

## 2017-11-10 PROCEDURE — 70450 CT HEAD/BRAIN W/O DYE: CPT | Mod: 26

## 2017-11-10 NOTE — ED PROVIDER NOTE - SKIN, MLM
Skin normal color for race, warm, dry. No evidence of rash. +Abrasions to forehead right face, nasal bridge, right palm. +2cm skin tear to volar surface of right thumb.

## 2017-11-10 NOTE — ED ADULT NURSE REASSESSMENT NOTE - COMFORT CARE
assisted with urinal and assisted patient getting dressed before discharge./assisted to bathroom
repositioned/hourly rounding completed/meal provided/side rails up

## 2017-11-10 NOTE — ED PROVIDER NOTE - MEDICAL DECISION MAKING DETAILS
Labs and imaging unremarkable.  Diagnosis of skin tear to hand, minor head injury.  Okay for d/c home at this time.

## 2017-11-10 NOTE — ED PROVIDER NOTE - OBJECTIVE STATEMENT
89 y/o M with PMHx of stents, CABG, afib, pacemaker presents to the ED s/p fall at 12:30pm today. Pt reports tripping at home in driveway. Pt reports abrasions to b/l hands, nose and swelling on right knee. Pt can currently walk with a limp. Pt denies LOC. Pt has abrasion to forehead not related to the fall and was seen at [facility] for it. Pt is on coumadin. Last tetanus shot is within the past 3 years.

## 2017-11-10 NOTE — ED ADULT TRIAGE NOTE - CHIEF COMPLAINT QUOTE
Patient presents post trip and fall. Patient states he was walking in his driveway this mornign and thinks he tripped over something. Denies LOC. Patient on coumadin. Abrasions to his face.

## 2017-11-10 NOTE — ED ADULT NURSE REASSESSMENT NOTE - NS ED NURSE REASSESS COMMENT FT1
Care transferred from Alfreda Abdalla RN to myself, pt resting comfortable in bed, sitting upright eating. Pt denies pain, updated on plan of care. DC pending

## 2017-11-10 NOTE — ED PROVIDER NOTE - CARE PLAN
Principal Discharge DX:	Injury of head, initial encounter  Secondary Diagnosis:	Skin tear of hand without complication

## 2017-11-28 ENCOUNTER — APPOINTMENT (OUTPATIENT)
Dept: ELECTROPHYSIOLOGY | Facility: CLINIC | Age: 82
End: 2017-11-28
Payer: MEDICARE

## 2017-11-28 VITALS
WEIGHT: 160 LBS | DIASTOLIC BLOOD PRESSURE: 74 MMHG | BODY MASS INDEX: 23.7 KG/M2 | SYSTOLIC BLOOD PRESSURE: 133 MMHG | HEIGHT: 69 IN | HEART RATE: 80 BPM

## 2017-11-28 PROCEDURE — 93279 PRGRMG DEV EVAL PM/LDLS PM: CPT

## 2018-01-01 NOTE — CHART NOTE - NSCHARTNOTEFT_GEN_A_CORE
pt c/o excessive belching. pt had been on venti mask earlier, and had his neck hyperextended.  aaox2 (person and place)  Pt's breathing looks more comfortable and has improved. Pt c/o belching while talking, involuntarily.  Pt on NC. Also c/o chest pressure. Pt already received asa 325mg po earlier, and is on simvastatin 40mg po qhs.  trops stable 0.165-->0.167-->0.165  ekg with very mild st depressions in v3,v4  denies passing gas through anus.    a/p: pt w/ excessive belching, not passing gas through anus.  -r/o obstruction - abd xray stat  -pt may have been habitually swallowing air while on venti mask  -continue to monitor  -f/u 3rd troponin pt c/o excessive belching. pt had been on venti mask earlier, and had his neck hyperextended.  aaox2 (person and place)  Pt's breathing looks more comfortable and has improved. Pt c/o belching while talking, involuntarily.  Pt on NC. Also c/o chest pressure. Pt already received asa 325mg po earlier, and is on simvastatin 40mg po qhs.  trops stable 0.165-->0.167-->0.165  ekg with very mild st depressions in v3,v4  denies passing gas through anus.    cvs: +s1/s2 irregularly irregular  resp: +rhonchi b/l but improved since previous lung exam  abd: +loud bs, distended, nontender to deep palpation      a/p: pt w/ excessive belching, not passing gas through anus.  -r/o obstruction - abd xray stat  -pt may have been habitually swallowing air while on venti mask  -continue to monitor  -f/u 3rd troponin normal (ped)... In no apparent distress, appears well developed and well nourished.

## 2018-01-13 ENCOUNTER — EMERGENCY (EMERGENCY)
Facility: HOSPITAL | Age: 83
LOS: 0 days | Discharge: HOME | End: 2018-01-14
Attending: EMERGENCY MEDICINE | Admitting: EMERGENCY MEDICINE
Payer: MEDICARE

## 2018-01-13 VITALS
HEART RATE: 80 BPM | OXYGEN SATURATION: 98 % | DIASTOLIC BLOOD PRESSURE: 74 MMHG | SYSTOLIC BLOOD PRESSURE: 126 MMHG | TEMPERATURE: 98 F | RESPIRATION RATE: 18 BRPM

## 2018-01-13 VITALS
DIASTOLIC BLOOD PRESSURE: 70 MMHG | RESPIRATION RATE: 16 BRPM | WEIGHT: 154.98 LBS | HEART RATE: 78 BPM | TEMPERATURE: 98 F | SYSTOLIC BLOOD PRESSURE: 128 MMHG | OXYGEN SATURATION: 99 %

## 2018-01-13 DIAGNOSIS — Z98.61 CORONARY ANGIOPLASTY STATUS: ICD-10-CM

## 2018-01-13 DIAGNOSIS — I10 ESSENTIAL (PRIMARY) HYPERTENSION: ICD-10-CM

## 2018-01-13 DIAGNOSIS — E78.5 HYPERLIPIDEMIA, UNSPECIFIED: ICD-10-CM

## 2018-01-13 DIAGNOSIS — R94.31 ABNORMAL ELECTROCARDIOGRAM [ECG] [EKG]: ICD-10-CM

## 2018-01-13 DIAGNOSIS — M50.30 OTHER CERVICAL DISC DEGENERATION, UNSPECIFIED CERVICAL REGION: ICD-10-CM

## 2018-01-13 DIAGNOSIS — S01.01XA LACERATION WITHOUT FOREIGN BODY OF SCALP, INITIAL ENCOUNTER: ICD-10-CM
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
## 2018-01-13 DIAGNOSIS — Z95.0 PRESENCE OF CARDIAC PACEMAKER: ICD-10-CM

## 2018-01-13 DIAGNOSIS — Z79.01 LONG TERM (CURRENT) USE OF ANTICOAGULANTS: ICD-10-CM

## 2018-01-13 DIAGNOSIS — Z79.84 LONG TERM (CURRENT) USE OF ORAL HYPOGLYCEMIC DRUGS: ICD-10-CM

## 2018-01-13 DIAGNOSIS — Z95.5 PRESENCE OF CORONARY ANGIOPLASTY IMPLANT AND GRAFT: Chronic | ICD-10-CM
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
## 2018-01-13 DIAGNOSIS — S01.91XA LACERATION WITHOUT FOREIGN BODY OF UNSPECIFIED PART OF HEAD, INITIAL ENCOUNTER: ICD-10-CM

## 2018-01-13 DIAGNOSIS — I25.10 ATHEROSCLEROTIC HEART DISEASE OF NATIVE CORONARY ARTERY WITHOUT ANGINA PECTORIS: ICD-10-CM
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
## 2018-01-13 DIAGNOSIS — S06.0X0A CONCUSSION WITHOUT LOSS OF CONSCIOUSNESS, INITIAL ENCOUNTER: ICD-10-CM

## 2018-01-13 LAB
BASOPHILS # BLD AUTO: 0.1 K/UL — SIGNIFICANT CHANGE UP (ref 0–0.2)
BASOPHILS NFR BLD AUTO: 1.4 % — SIGNIFICANT CHANGE UP (ref 0–2)
EOSINOPHIL # BLD AUTO: 0.1 K/UL — SIGNIFICANT CHANGE UP (ref 0–0.5)
EOSINOPHIL NFR BLD AUTO: 0.6 % — SIGNIFICANT CHANGE UP (ref 0–6)
HCT VFR BLD CALC: 34.1 % — LOW (ref 39–50)
HGB BLD-MCNC: 11.2 G/DL — LOW (ref 13–17)
LYMPHOCYTES # BLD AUTO: 1.8 K/UL — SIGNIFICANT CHANGE UP (ref 1–3.3)
LYMPHOCYTES # BLD AUTO: 18 % — SIGNIFICANT CHANGE UP (ref 13–44)
MCHC RBC-ENTMCNC: 32.7 GM/DL — SIGNIFICANT CHANGE UP (ref 32–36)
MCHC RBC-ENTMCNC: 33.7 PG — SIGNIFICANT CHANGE UP (ref 27–34)
MCV RBC AUTO: 103 FL — HIGH (ref 80–100)
MONOCYTES # BLD AUTO: 1 K/UL — HIGH (ref 0–0.9)
MONOCYTES NFR BLD AUTO: 9.4 % — SIGNIFICANT CHANGE UP (ref 2–14)
NEUTROPHILS # BLD AUTO: 7.2 K/UL — SIGNIFICANT CHANGE UP (ref 1.8–7.4)
NEUTROPHILS NFR BLD AUTO: 70.6 % — SIGNIFICANT CHANGE UP (ref 43–77)
PLATELET # BLD AUTO: 132 K/UL — LOW (ref 150–400)
RBC # BLD: 3.31 M/UL — LOW (ref 4.2–5.8)
RBC # FLD: 13.6 % — SIGNIFICANT CHANGE UP (ref 10.3–14.5)
WBC # BLD: 10.2 K/UL — SIGNIFICANT CHANGE UP (ref 3.8–10.5)
WBC # FLD AUTO: 10.2 K/UL — SIGNIFICANT CHANGE UP (ref 3.8–10.5)

## 2018-01-13 PROCEDURE — 99285 EMERGENCY DEPT VISIT HI MDM: CPT | Mod: 25

## 2018-01-13 PROCEDURE — 71046 X-RAY EXAM CHEST 2 VIEWS: CPT | Mod: 26

## 2018-01-13 PROCEDURE — 72190 X-RAY EXAM OF PELVIS: CPT | Mod: 26

## 2018-01-13 PROCEDURE — 12001 RPR S/N/AX/GEN/TRNK 2.5CM/<: CPT

## 2018-01-13 PROCEDURE — 70450 CT HEAD/BRAIN W/O DYE: CPT | Mod: 26

## 2018-01-13 PROCEDURE — 72125 CT NECK SPINE W/O DYE: CPT | Mod: 26

## 2018-01-13 PROCEDURE — 93010 ELECTROCARDIOGRAM REPORT: CPT

## 2018-01-13 PROCEDURE — 73030 X-RAY EXAM OF SHOULDER: CPT | Mod: 26,LT

## 2018-01-13 RX ORDER — TETANUS TOXOID, REDUCED DIPHTHERIA TOXOID AND ACELLULAR PERTUSSIS VACCINE, ADSORBED 5; 2.5; 8; 8; 2.5 [IU]/.5ML; [IU]/.5ML; UG/.5ML; UG/.5ML; UG/.5ML
0.5 SUSPENSION INTRAMUSCULAR ONCE
Qty: 0 | Refills: 0 | Status: COMPLETED | OUTPATIENT
Start: 2018-01-13 | End: 2018-01-13

## 2018-01-13 RX ADMIN — TETANUS TOXOID, REDUCED DIPHTHERIA TOXOID AND ACELLULAR PERTUSSIS VACCINE, ADSORBED 0.5 MILLILITER(S): 5; 2.5; 8; 8; 2.5 SUSPENSION INTRAMUSCULAR at 23:56

## 2018-01-13 NOTE — ED PROVIDER NOTE - OBJECTIVE STATEMENT
89 yo man w/ h/o htn, hld, dm, cad, afib (s/p ppm, on comadin) p/w fall. States he was walking into his house when he lost his balance on the step and fell backward, striking head and posterior L shoulder. Was helped to his feet. Sustained bleeding from scalp in process. Remembers entire event, no LOC before or after. Felt nauseous, but that resolved. Denies loc, weakness, sensory deficit, vomiting, neck pain, abd pain.

## 2018-01-13 NOTE — ED PROVIDER NOTE - MUSCULOSKELETAL, MLM
No spinal midline tenderness with palpation. L shoulder tender w/ rom. Hematoma over L scapula. All other joints ranged without deformity or tenderness.

## 2018-01-13 NOTE — ED PROCEDURE NOTE - ATTENDING CONTRIBUTION TO CARE
I personally saw the patient with the resident, and completed the key components of the history and physical exam. I then discussed the management plan with the resident.   agree with procedure as dcouemnted and supervised

## 2018-01-13 NOTE — ED PROVIDER NOTE - PROGRESS NOTE DETAILS
Ryan Sagastume MD PGY4: Labs, imaging reviewed. Lacerations repaired. Will discharge with instructions for outpatient follow-up.

## 2018-01-13 NOTE — ED PROVIDER NOTE - PLAN OF CARE
Keep the wound dry for the first 48 hours. Afterward, allow soapy water to run over the area, but do not scrub or immerse in water.  Return to the emergency department in 7-10 days for staple removal. The best time to come with the shortest wait time is 8:00AM. Alternatively, you may visit your primary care doctor for staple removal; however, many primary care doctors will not remove staples placed by someone else, so check with him/her prior to visiting.  Take acetaminophen (Tylenol) 650mg (2 tablets) up to 6 times per day as needed for pain. Never take more than 3000mg of acetaminophen/Tylenol in any 24 hour period.   Return to this Emergency Department if you experience fever, redness around or streaking from the area, thick drainage, or any other emergency.

## 2018-01-13 NOTE — ED PROVIDER NOTE - CARE PLAN
Principal Discharge DX:	Scalp laceration, initial encounter  Instructions for follow-up, activity and diet:	Keep the wound dry for the first 48 hours. Afterward, allow soapy water to run over the area, but do not scrub or immerse in water.  Return to the emergency department in 7-10 days for staple removal. The best time to come with the shortest wait time is 8:00AM. Alternatively, you may visit your primary care doctor for staple removal; however, many primary care doctors will not remove staples placed by someone else, so check with him/her prior to visiting.  Take acetaminophen (Tylenol) 650mg (2 tablets) up to 6 times per day as needed for pain. Never take more than 3000mg of acetaminophen/Tylenol in any 24 hour period.   Return to this Emergency Department if you experience fever, redness around or streaking from the area, thick drainage, or any other emergency. Principal Discharge DX:	Scalp laceration, initial encounter  Instructions for follow-up, activity and diet:	Keep the wound dry for the first 48 hours. Afterward, allow soapy water to run over the area, but do not scrub or immerse in water.  Return to the emergency department in 7-10 days for staple removal. The best time to come with the shortest wait time is 8:00AM. Alternatively, you may visit your primary care doctor for staple removal; however, many primary care doctors will not remove staples placed by someone else, so check with him/her prior to visiting.  Take acetaminophen (Tylenol) 650mg (2 tablets) up to 6 times per day as needed for pain. Never take more than 3000mg of acetaminophen/Tylenol in any 24 hour period.   Return to this Emergency Department if you experience fever, redness around or streaking from the area, thick drainage, or any other emergency.  Secondary Diagnosis:	Concussion

## 2018-01-13 NOTE — ED PROVIDER NOTE - MEDICAL DECISION MAKING DETAILS
89 yo man w/ mechanical fall. Given that he is on coumadin, will scan head and cervical spine. Otherwise, labs, cxr, pelvis xr, shoulder xr. Pending negative workup, can likely go home as he lives w/ son and mother. Will also require repair of scalp lacerations.

## 2018-01-13 NOTE — ED PROVIDER NOTE - ATTENDING CONTRIBUTION TO CARE
I personally saw the patient with the resident, and completed the key components of the history and physical exam. I then discussed the management plan with the resident.   gen in nad resp clear cardiac no murmur abd soft neuro no deficits  agree with resdient plan of care  wound care precautions discussed.   return to ed for intractable HA, persistent vomiting, or new onset motor/sensory deficits

## 2018-01-13 NOTE — ED ADULT TRIAGE NOTE - CHIEF COMPLAINT QUOTE
Pt presents to ED s/p slip and fall in driveway hitting the back of his head. Pt reports lac to back of head. Pt denies LOC. Pt reports he takes Coumadin. Trauma alert activated in triage at 18:10

## 2018-01-14 LAB
ALBUMIN SERPL ELPH-MCNC: 3.7 G/DL — SIGNIFICANT CHANGE UP (ref 3.3–5)
ALP SERPL-CCNC: 66 U/L — SIGNIFICANT CHANGE UP (ref 40–120)
ALT FLD-CCNC: 31 U/L — SIGNIFICANT CHANGE UP (ref 12–78)
ANION GAP SERPL CALC-SCNC: 5 MMOL/L — SIGNIFICANT CHANGE UP (ref 5–17)
APTT BLD: 46.8 SEC — HIGH (ref 27.5–37.4)
AST SERPL-CCNC: 46 U/L — HIGH (ref 15–37)
BILIRUB SERPL-MCNC: 0.7 MG/DL — SIGNIFICANT CHANGE UP (ref 0.2–1.2)
BUN SERPL-MCNC: 34 MG/DL — HIGH (ref 7–23)
CALCIUM SERPL-MCNC: 8.8 MG/DL — SIGNIFICANT CHANGE UP (ref 8.5–10.1)
CHLORIDE SERPL-SCNC: 107 MMOL/L — SIGNIFICANT CHANGE UP (ref 96–108)
CO2 SERPL-SCNC: 28 MMOL/L — SIGNIFICANT CHANGE UP (ref 22–31)
CREAT SERPL-MCNC: 1.29 MG/DL — SIGNIFICANT CHANGE UP (ref 0.5–1.3)
GLUCOSE SERPL-MCNC: 133 MG/DL — HIGH (ref 70–99)
INR BLD: 2.79 RATIO — HIGH (ref 0.88–1.16)
POTASSIUM SERPL-MCNC: 4.6 MMOL/L — SIGNIFICANT CHANGE UP (ref 3.5–5.3)
POTASSIUM SERPL-SCNC: 4.6 MMOL/L — SIGNIFICANT CHANGE UP (ref 3.5–5.3)
PROT SERPL-MCNC: 7.3 GM/DL — SIGNIFICANT CHANGE UP (ref 6–8.3)
PROTHROM AB SERPL-ACNC: 30.8 SEC — HIGH (ref 9.8–12.7)
SODIUM SERPL-SCNC: 140 MMOL/L — SIGNIFICANT CHANGE UP (ref 135–145)

## 2018-01-28 ENCOUNTER — EMERGENCY (EMERGENCY)
Facility: HOSPITAL | Age: 83
LOS: 0 days | Discharge: HOME | End: 2018-01-28
Attending: EMERGENCY MEDICINE | Admitting: EMERGENCY MEDICINE
Payer: MEDICARE

## 2018-01-28 VITALS — WEIGHT: 160.06 LBS | HEIGHT: 69 IN

## 2018-01-28 VITALS
RESPIRATION RATE: 16 BRPM | HEART RATE: 75 BPM | TEMPERATURE: 98 F | OXYGEN SATURATION: 98 % | SYSTOLIC BLOOD PRESSURE: 115 MMHG | DIASTOLIC BLOOD PRESSURE: 46 MMHG

## 2018-01-28 DIAGNOSIS — I48.91 UNSPECIFIED ATRIAL FIBRILLATION: ICD-10-CM

## 2018-01-28 DIAGNOSIS — I25.10 ATHEROSCLEROTIC HEART DISEASE OF NATIVE CORONARY ARTERY WITHOUT ANGINA PECTORIS: ICD-10-CM

## 2018-01-28 DIAGNOSIS — Z95.5 PRESENCE OF CORONARY ANGIOPLASTY IMPLANT AND GRAFT: Chronic | ICD-10-CM

## 2018-01-28 DIAGNOSIS — I10 ESSENTIAL (PRIMARY) HYPERTENSION: ICD-10-CM
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
## 2018-01-28 DIAGNOSIS — Z98.61 CORONARY ANGIOPLASTY STATUS: ICD-10-CM

## 2018-01-28 DIAGNOSIS — E78.5 HYPERLIPIDEMIA, UNSPECIFIED: ICD-10-CM

## 2018-01-28 DIAGNOSIS — E11.9 TYPE 2 DIABETES MELLITUS WITHOUT COMPLICATIONS: ICD-10-CM

## 2018-01-28 DIAGNOSIS — S01.91XD LACERATION WITHOUT FOREIGN BODY OF UNSPECIFIED PART OF HEAD, SUBSEQUENT ENCOUNTER: ICD-10-CM

## 2018-01-28 DIAGNOSIS — Z95.0 PRESENCE OF CARDIAC PACEMAKER: ICD-10-CM

## 2018-01-28 DIAGNOSIS — Z79.84 LONG TERM (CURRENT) USE OF ORAL HYPOGLYCEMIC DRUGS: ICD-10-CM

## 2018-01-28 DIAGNOSIS — Z79.01 LONG TERM (CURRENT) USE OF ANTICOAGULANTS: ICD-10-CM

## 2018-01-28 PROCEDURE — 99285 EMERGENCY DEPT VISIT HI MDM: CPT

## 2018-01-28 NOTE — ED PROVIDER NOTE - HEAD SHAPE
Four intact sutures to posterior scalp.  Mild swelling and scab formation without exudate.  Mildly TTP.

## 2018-01-28 NOTE — ED PROVIDER NOTE - PROGRESS NOTE DETAILS
Attending Vega, 89 yo pt presents for staple removal.  Pt here with wife.  Pt s.p fall 11/13.  Pt with no active bleeding, 4 staples on scalp.  Plan scalp removal.

## 2018-01-28 NOTE — ED PROVIDER NOTE - ATTENDING CONTRIBUTION TO CARE
I, Rosie Vega MD, personally saw the patient with ACP.  I have personally performed a face to face diagnostic evaluation on this patient.  I have reviewed the ACP note and agree with the history, exam, and plan of care, except as noted.

## 2018-02-25 ENCOUNTER — INPATIENT (INPATIENT)
Facility: HOSPITAL | Age: 83
LOS: 2 days | Discharge: HOME | End: 2018-02-28
Attending: FAMILY MEDICINE | Admitting: FAMILY MEDICINE
Payer: MEDICARE

## 2018-02-25 VITALS — HEIGHT: 69 IN | WEIGHT: 160.06 LBS

## 2018-02-25 DIAGNOSIS — Z95.5 PRESENCE OF CORONARY ANGIOPLASTY IMPLANT AND GRAFT: Chronic | ICD-10-CM

## 2018-02-25 LAB
ALBUMIN SERPL ELPH-MCNC: 4 G/DL — SIGNIFICANT CHANGE UP (ref 3.3–5)
ALP SERPL-CCNC: 140 U/L — HIGH (ref 40–120)
ALT FLD-CCNC: 29 U/L — SIGNIFICANT CHANGE UP (ref 12–78)
ANION GAP SERPL CALC-SCNC: 8 MMOL/L — SIGNIFICANT CHANGE UP (ref 5–17)
AST SERPL-CCNC: 44 U/L — HIGH (ref 15–37)
BASOPHILS # BLD AUTO: 0.1 K/UL — SIGNIFICANT CHANGE UP (ref 0–0.2)
BASOPHILS NFR BLD AUTO: 1.2 % — SIGNIFICANT CHANGE UP (ref 0–2)
BILIRUB SERPL-MCNC: 0.7 MG/DL — SIGNIFICANT CHANGE UP (ref 0.2–1.2)
BUN SERPL-MCNC: 28 MG/DL — HIGH (ref 7–23)
CALCIUM SERPL-MCNC: 9.1 MG/DL — SIGNIFICANT CHANGE UP (ref 8.5–10.1)
CHLORIDE SERPL-SCNC: 104 MMOL/L — SIGNIFICANT CHANGE UP (ref 96–108)
CO2 SERPL-SCNC: 27 MMOL/L — SIGNIFICANT CHANGE UP (ref 22–31)
CREAT SERPL-MCNC: 1.22 MG/DL — SIGNIFICANT CHANGE UP (ref 0.5–1.3)
DIGOXIN SERPL-MCNC: 1.29 NG/ML — SIGNIFICANT CHANGE UP (ref 0.8–2)
EOSINOPHIL # BLD AUTO: 0.1 K/UL — SIGNIFICANT CHANGE UP (ref 0–0.5)
EOSINOPHIL NFR BLD AUTO: 2 % — SIGNIFICANT CHANGE UP (ref 0–6)
GLUCOSE BLDC GLUCOMTR-MCNC: 91 MG/DL — SIGNIFICANT CHANGE UP (ref 70–99)
GLUCOSE SERPL-MCNC: 100 MG/DL — HIGH (ref 70–99)
HCT VFR BLD CALC: 35.3 % — LOW (ref 39–50)
HGB BLD-MCNC: 11.6 G/DL — LOW (ref 13–17)
INR BLD: 5.19 RATIO — CRITICAL HIGH (ref 0.88–1.16)
LACTATE SERPL-SCNC: 1 MMOL/L — SIGNIFICANT CHANGE UP (ref 0.7–2)
LYMPHOCYTES # BLD AUTO: 2.8 K/UL — SIGNIFICANT CHANGE UP (ref 1–3.3)
LYMPHOCYTES # BLD AUTO: 38.5 % — SIGNIFICANT CHANGE UP (ref 13–44)
MAGNESIUM SERPL-MCNC: 2.2 MG/DL — SIGNIFICANT CHANGE UP (ref 1.6–2.6)
MCHC RBC-ENTMCNC: 32.8 GM/DL — SIGNIFICANT CHANGE UP (ref 32–36)
MCHC RBC-ENTMCNC: 33.5 PG — SIGNIFICANT CHANGE UP (ref 27–34)
MCV RBC AUTO: 102 FL — HIGH (ref 80–100)
MONOCYTES # BLD AUTO: 0.7 K/UL — SIGNIFICANT CHANGE UP (ref 0–0.9)
MONOCYTES NFR BLD AUTO: 9.2 % — SIGNIFICANT CHANGE UP (ref 2–14)
NEUTROPHILS # BLD AUTO: 3.6 K/UL — SIGNIFICANT CHANGE UP (ref 1.8–7.4)
NEUTROPHILS NFR BLD AUTO: 49.1 % — SIGNIFICANT CHANGE UP (ref 43–77)
PLATELET # BLD AUTO: 190 K/UL — SIGNIFICANT CHANGE UP (ref 150–400)
POTASSIUM SERPL-MCNC: 4.3 MMOL/L — SIGNIFICANT CHANGE UP (ref 3.5–5.3)
POTASSIUM SERPL-SCNC: 4.3 MMOL/L — SIGNIFICANT CHANGE UP (ref 3.5–5.3)
PROT SERPL-MCNC: 8.1 GM/DL — SIGNIFICANT CHANGE UP (ref 6–8.3)
PROTHROM AB SERPL-ACNC: 58 SEC — HIGH (ref 9.8–12.7)
RBC # BLD: 3.46 M/UL — LOW (ref 4.2–5.8)
RBC # FLD: 13.4 % — SIGNIFICANT CHANGE UP (ref 10.3–14.5)
SODIUM SERPL-SCNC: 139 MMOL/L — SIGNIFICANT CHANGE UP (ref 135–145)
TROPONIN I SERPL-MCNC: <0.015 NG/ML — SIGNIFICANT CHANGE UP (ref 0.01–0.04)
TROPONIN I SERPL-MCNC: <0.015 NG/ML — SIGNIFICANT CHANGE UP (ref 0.01–0.04)
WBC # BLD: 7.4 K/UL — SIGNIFICANT CHANGE UP (ref 3.8–10.5)
WBC # FLD AUTO: 7.4 K/UL — SIGNIFICANT CHANGE UP (ref 3.8–10.5)

## 2018-02-25 PROCEDURE — 72192 CT PELVIS W/O DYE: CPT | Mod: 26

## 2018-02-25 PROCEDURE — 93010 ELECTROCARDIOGRAM REPORT: CPT

## 2018-02-25 PROCEDURE — 71045 X-RAY EXAM CHEST 1 VIEW: CPT | Mod: 26

## 2018-02-25 PROCEDURE — 71250 CT THORAX DX C-: CPT | Mod: 26

## 2018-02-25 PROCEDURE — 73552 X-RAY EXAM OF FEMUR 2/>: CPT | Mod: 26

## 2018-02-25 PROCEDURE — 99285 EMERGENCY DEPT VISIT HI MDM: CPT

## 2018-02-25 PROCEDURE — 73562 X-RAY EXAM OF KNEE 3: CPT | Mod: 26,LT

## 2018-02-25 PROCEDURE — 76377 3D RENDER W/INTRP POSTPROCES: CPT | Mod: 26

## 2018-02-25 RX ORDER — CEFTRIAXONE 500 MG/1
1000 INJECTION, POWDER, FOR SOLUTION INTRAMUSCULAR; INTRAVENOUS ONCE
Qty: 0 | Refills: 0 | Status: COMPLETED | OUTPATIENT
Start: 2018-02-25 | End: 2018-02-25

## 2018-02-25 RX ORDER — INSULIN LISPRO 100/ML
VIAL (ML) SUBCUTANEOUS
Qty: 0 | Refills: 0 | Status: DISCONTINUED | OUTPATIENT
Start: 2018-02-25 | End: 2018-02-28

## 2018-02-25 RX ORDER — DEXTROSE 50 % IN WATER 50 %
12.5 SYRINGE (ML) INTRAVENOUS ONCE
Qty: 0 | Refills: 0 | Status: DISCONTINUED | OUTPATIENT
Start: 2018-02-25 | End: 2018-02-28

## 2018-02-25 RX ORDER — DILTIAZEM HCL 120 MG
120 CAPSULE, EXT RELEASE 24 HR ORAL DAILY
Qty: 0 | Refills: 0 | Status: DISCONTINUED | OUTPATIENT
Start: 2018-02-25 | End: 2018-02-28

## 2018-02-25 RX ORDER — GLUCAGON INJECTION, SOLUTION 0.5 MG/.1ML
1 INJECTION, SOLUTION SUBCUTANEOUS ONCE
Qty: 0 | Refills: 0 | Status: DISCONTINUED | OUTPATIENT
Start: 2018-02-25 | End: 2018-02-28

## 2018-02-25 RX ORDER — DEXTROSE 50 % IN WATER 50 %
1 SYRINGE (ML) INTRAVENOUS ONCE
Qty: 0 | Refills: 0 | Status: DISCONTINUED | OUTPATIENT
Start: 2018-02-25 | End: 2018-02-28

## 2018-02-25 RX ORDER — INSULIN LISPRO 100/ML
VIAL (ML) SUBCUTANEOUS AT BEDTIME
Qty: 0 | Refills: 0 | Status: DISCONTINUED | OUTPATIENT
Start: 2018-02-25 | End: 2018-02-28

## 2018-02-25 RX ORDER — AZITHROMYCIN 500 MG/1
500 TABLET, FILM COATED ORAL ONCE
Qty: 0 | Refills: 0 | Status: COMPLETED | OUTPATIENT
Start: 2018-02-25 | End: 2018-02-25

## 2018-02-25 RX ORDER — SODIUM CHLORIDE 9 MG/ML
1000 INJECTION INTRAMUSCULAR; INTRAVENOUS; SUBCUTANEOUS ONCE
Qty: 0 | Refills: 0 | Status: COMPLETED | OUTPATIENT
Start: 2018-02-25 | End: 2018-02-25

## 2018-02-25 RX ORDER — METOPROLOL TARTRATE 50 MG
50 TABLET ORAL DAILY
Qty: 0 | Refills: 0 | Status: DISCONTINUED | OUTPATIENT
Start: 2018-02-25 | End: 2018-02-28

## 2018-02-25 RX ORDER — FUROSEMIDE 40 MG
40 TABLET ORAL DAILY
Qty: 0 | Refills: 0 | Status: DISCONTINUED | OUTPATIENT
Start: 2018-02-25 | End: 2018-02-25

## 2018-02-25 RX ORDER — ACETAMINOPHEN 500 MG
1000 TABLET ORAL ONCE
Qty: 0 | Refills: 0 | Status: COMPLETED | OUTPATIENT
Start: 2018-02-25 | End: 2018-02-25

## 2018-02-25 RX ORDER — CEFTRIAXONE 500 MG/1
1 INJECTION, POWDER, FOR SOLUTION INTRAMUSCULAR; INTRAVENOUS ONCE
Qty: 0 | Refills: 0 | Status: DISCONTINUED | OUTPATIENT
Start: 2018-02-25 | End: 2018-02-25

## 2018-02-25 RX ORDER — DEXTROSE 50 % IN WATER 50 %
25 SYRINGE (ML) INTRAVENOUS ONCE
Qty: 0 | Refills: 0 | Status: DISCONTINUED | OUTPATIENT
Start: 2018-02-25 | End: 2018-02-28

## 2018-02-25 RX ORDER — LEVOTHYROXINE SODIUM 125 MCG
75 TABLET ORAL DAILY
Qty: 0 | Refills: 0 | Status: DISCONTINUED | OUTPATIENT
Start: 2018-02-25 | End: 2018-02-28

## 2018-02-25 RX ORDER — ACETAMINOPHEN 500 MG
650 TABLET ORAL EVERY 6 HOURS
Qty: 0 | Refills: 0 | Status: DISCONTINUED | OUTPATIENT
Start: 2018-02-25 | End: 2018-02-28

## 2018-02-25 RX ORDER — CEFTRIAXONE 500 MG/1
1000 INJECTION, POWDER, FOR SOLUTION INTRAMUSCULAR; INTRAVENOUS
Qty: 0 | Refills: 0 | Status: DISCONTINUED | OUTPATIENT
Start: 2018-02-26 | End: 2018-02-28

## 2018-02-25 RX ORDER — AZITHROMYCIN 500 MG/1
250 TABLET, FILM COATED ORAL DAILY
Qty: 0 | Refills: 0 | Status: DISCONTINUED | OUTPATIENT
Start: 2018-02-25 | End: 2018-02-28

## 2018-02-25 RX ORDER — ASPIRIN/CALCIUM CARB/MAGNESIUM 324 MG
324 TABLET ORAL ONCE
Qty: 0 | Refills: 0 | Status: COMPLETED | OUTPATIENT
Start: 2018-02-25 | End: 2018-02-25

## 2018-02-25 RX ORDER — SODIUM CHLORIDE 9 MG/ML
1000 INJECTION, SOLUTION INTRAVENOUS
Qty: 0 | Refills: 0 | Status: DISCONTINUED | OUTPATIENT
Start: 2018-02-25 | End: 2018-02-28

## 2018-02-25 RX ORDER — CEFTRIAXONE 500 MG/1
1 INJECTION, POWDER, FOR SOLUTION INTRAMUSCULAR; INTRAVENOUS EVERY 24 HOURS
Qty: 0 | Refills: 0 | Status: DISCONTINUED | OUTPATIENT
Start: 2018-02-25 | End: 2018-02-25

## 2018-02-25 RX ORDER — DIGOXIN 250 MCG
0.12 TABLET ORAL DAILY
Qty: 0 | Refills: 0 | Status: DISCONTINUED | OUTPATIENT
Start: 2018-02-25 | End: 2018-02-28

## 2018-02-25 RX ORDER — OXYCODONE AND ACETAMINOPHEN 5; 325 MG/1; MG/1
1 TABLET ORAL EVERY 6 HOURS
Qty: 0 | Refills: 0 | Status: DISCONTINUED | OUTPATIENT
Start: 2018-02-25 | End: 2018-02-28

## 2018-02-25 RX ORDER — SIMVASTATIN 20 MG/1
40 TABLET, FILM COATED ORAL AT BEDTIME
Qty: 0 | Refills: 0 | Status: DISCONTINUED | OUTPATIENT
Start: 2018-02-25 | End: 2018-02-28

## 2018-02-25 RX ADMIN — Medication 1000 MILLIGRAM(S): at 23:42

## 2018-02-25 RX ADMIN — AZITHROMYCIN 255 MILLIGRAM(S): 500 TABLET, FILM COATED ORAL at 19:54

## 2018-02-25 RX ADMIN — CEFTRIAXONE 1000 MILLIGRAM(S): 500 INJECTION, POWDER, FOR SOLUTION INTRAMUSCULAR; INTRAVENOUS at 19:54

## 2018-02-25 RX ADMIN — SODIUM CHLORIDE 1000 MILLILITER(S): 9 INJECTION INTRAMUSCULAR; INTRAVENOUS; SUBCUTANEOUS at 18:35

## 2018-02-25 RX ADMIN — Medication 1000 MILLIGRAM(S): at 19:54

## 2018-02-25 RX ADMIN — Medication 324 MILLIGRAM(S): at 16:16

## 2018-02-25 NOTE — H&P ADULT - HISTORY OF PRESENT ILLNESS
89 year old male with past medical history of Atrial Fibrillation, CAD s/p CABG (1988) s/p Stent, DM HTN, HLD, presenting with complaints of CP x 1 day. Chest Pain started last night while patient was in bed. Chest Pain is located beneath the left subcostal ribs without radiation, occurring intermittently with episodes lasting seconds, and described as sharp. Denies N/V, SOB, Diaphoresis, or ABD Pain. Patient was sustained a Fall on January 13th and fell on his back. He was evaluated in the ED at the time and imaging studies report no fractures. Patient presents to the ED due to concerns that his CP may be cardiac related.    In the ED, CT Chest/ABD/Pelvis reveals LLL PNA, subacute comminuted fracture of the LEFT scapula and subacute fractures of the LEFT 2nd-10th ribs. Patient was treated with Azithromycin and Ceftriaxone in ED. 89 year old male with past medical history of Atrial Fibrillation, CAD s/p CABG (1988) s/p Stent, DM HTN, HLD, presenting with complaints of CP x 1 day. Chest Pain started last night while patient was in bed. Chest Pain is located beneath the left subcostal ribs without radiation, occurring intermittently with episodes lasting seconds, and described as sharp. Denies Fever, Cough, N/V, SOB, Diaphoresis, or ABD Pain. Patient was sustained a Fall on January 13th and fell on his back. He was evaluated in the ED at the time and imaging studies report no fractures. Patient presents to the ED due to concerns that his CP may be cardiac related.    In the ED, CT Chest/ABD/Pelvis reveals LLL PNA, subacute comminuted fracture of the LEFT scapula and subacute fractures of the LEFT 2nd-10th ribs. Patient was treated with Azithromycin and Ceftriaxone in ED.

## 2018-02-25 NOTE — ED PROVIDER NOTE - OBJECTIVE STATEMENT
89 y/o male with PMHx of Afib on Coumadin, CAD, DM, HTN, HLD, with pacemaker presents to the ED c/o CP x 1 day. Episodes last a few seconds, are intermittent. Pt states that he was watching TV at the time of sudden onset. Describes pain as sharp. Denies SOB, nausea, diaphoresis. Denies fall, trauma. Wife notes fall on Jan 13th with residual myalgias for which pt presented to  ED. Did not take medications for any sx. PMD/Dr. Magdaleno

## 2018-02-25 NOTE — ED PROVIDER NOTE - CARE PLAN
Principal Discharge DX:	CAP (community acquired pneumonia)  Secondary Diagnosis:	Rib fractures  Secondary Diagnosis:	Scapular fracture

## 2018-02-25 NOTE — ED ADULT NURSE NOTE - OBJECTIVE STATEMENT
2 episodes of sharp cp last night lasting a few seconds, similar episode this morning, but not as severe as last night

## 2018-02-25 NOTE — H&P ADULT - NSHPREVIEWOFSYSTEMS_GEN_ALL_CORE
REVIEW OF SYSTEMS:  CONSTITUTIONAL: No weakness, fevers or chills  EYES/ENT: No visual changes;  No vertigo or throat pain   NECK: No pain or stiffness  RESPIRATORY: No cough, wheezing, hemoptysis; No shortness of breath  CARDIOVASCULAR: +chest pain or palpitations  GASTROINTESTINAL: No abdominal or epigastric pain. No nausea, vomiting, or hematemesis; No diarrhea or constipation. No melena or hematochezia.  GENITOURINARY: No dysuria, frequency or hematuria  NEUROLOGICAL: No numbness or weakness  SKIN: No itching, burning, rashes, or lesions   All other review of systems is negative unless indicated above.

## 2018-02-25 NOTE — H&P ADULT - NSHPPHYSICALEXAM_GEN_ALL_CORE
T(C): 36.4 (02-25-18 @ 15:26), Max: 36.4 (02-25-18 @ 15:26)  HR: 87 (02-25-18 @ 15:26) (87 - 87)  BP: 153/84 (02-25-18 @ 15:26) (153/84 - 153/84)  RR: 16 (02-25-18 @ 15:26) (16 - 16)  SpO2: 98% (02-25-18 @ 15:26) (98% - 98%)  Wt(kg): --    PHYSICAL EXAM:  GENERAL: NAD, well-groomed, well-developed  HEAD:  NC/AT,  EYES: EOMI, PERRL, no scleral icterus  HEENT: Dry mucous membranes  NECK: Supple, No JVD  LUNG: Decreased BS in bilateral Lower Lobes  HEART: RRR; No murmurs, rubs, or gallops  ABDOMEN: +BS, ST/ND/NT  EXTREMITIES:  2+ Peripheral Pulses, No clubbing, cyanosis, or edema  SKIN: Bilateral Lower Extremity with chronic venous changes  MUSCULOSKELTAL- TTP of the Left Scapula, and Left Ribs

## 2018-02-25 NOTE — H&P ADULT - ATTENDING COMMENTS
Pt seen and examined after initial eval by resident physician Raad Velez. Case discussed in detail. Agree with assessment and plan. This is a 89 y.o. male with PMH AFib on AC, CAD s/p CABG, stent, DM, HTN, HLD, hypothroidism presents with left lower chest pain, reproducible, sharp.    #LLL PNA  -admit to med surg  -cont azithro, ceftriaxone  -f/u cultures    #subacute L scapula fracture and 2-10 rib fractures  -pt reports fall in January  -ortho consult  -pain control    #CAD s/p CABG/stent  -cont home meds    #AFib on AC  -rate controlled  -cont digoxin, level 1.29  -hold coumadin, INR 5.19 supratherapeutic    #HTN, HLD, hypothyroidism  -cont home meds    #DM2  -fingerstick monitoring and ISS    #DVT ppx  -on coumadin

## 2018-02-25 NOTE — ED PROVIDER NOTE - MEDICAL DECISION MAKING DETAILS
Pt with subacute left scapular fracture, and 2-10 rib fractures, LLL pneumonia.  Treated with Azithromycin and Rocephin.  Admit to medicine service.

## 2018-02-25 NOTE — ED ADULT TRIAGE NOTE - CHIEF COMPLAINT QUOTE
Pt. to the ED C/O Left sided Rib and CP x 1 day - Pt. denies any recent physical injuries and SOB- Pt. states he had a fall in Jan 13 2018, but denies pain being related to fall - Hx. of Stents , PPM and  pre-Diabetes

## 2018-02-25 NOTE — H&P ADULT - ASSESSMENT
89 year old male with past medical history of Atrial Fibrillation, CAD s/p CABG (1988) s/p Stent, DM HTN, HLD, presenting with complaints of CP and found to have LLL Pneumonia, Fracture of the Scapula and 2nd-10th Ribs.     #CAP  - CT Chest: Left Lower Lobe Infiltrate  - Continue with Azithromycin and Ceftriaxone  - Follow-up Blood Cultures     #Fracture of the Left Scapula, 2nd-10th Ribs  - Orthopedic Consult  - Start Pain medications prn    #Atrial Fibrillation   - [id number] = 4  - Continue Cardizem and Metoprolol for rate-control  - Continue Coumadin 2.5mg (Takes it daily except for Tuesday); Hold for supratherapeutic INR    #HTN  - BP Stable  - Continue Toprol and Cardizem    #HLD  - Lipid panel for AM  - Continue Simvastatin    #Hypothyroidism  - TSH for AM  - Continue Levothyroxine    #DM  - Hold oral hypoglycemics  - HbA1c  - ISS    DVT Prophylaxis: Supratherapeutic INR 89 year old male with past medical history of Atrial Fibrillation, CAD s/p CABG (1988) s/p Stent, DM HTN, HLD, presenting with complaints of CP and found to have LLL Pneumonia, Fracture of the Scapula and 2nd-10th Ribs.     #CAP  - CT Chest: Left Lower Lobe Infiltrate  - Continue with Azithromycin and Ceftriaxone  - Follow-up Blood Cultures     #Fracture of the Left Scapula, 2nd-10th Ribs  - Orthopedic Consult  - Start Pain medications prn    #CAD s/p CABG s/p Stent  - CP secondary to rib fracture  - Trop negative x 2  - Continue BB, Statin    #Atrial Fibrillation   - [id number] = 4  - Continue Cardizem and Metoprolol for rate-control  - Continue Coumadin 2.5mg (Takes it daily except for Tuesday); Hold for supratherapeutic INR    #HTN  - BP Stable  - Continue Toprol and Cardizem  - Hold Lasix    #HLD  - Lipid panel for AM  - Continue Simvastatin    #Hypothyroidism  - TSH for AM  - Continue Levothyroxine    #DM  - Hold oral hypoglycemics  - HbA1c  - ISS    DVT Prophylaxis: Supratherapeutic INR

## 2018-02-26 LAB
ALBUMIN SERPL ELPH-MCNC: 3.4 G/DL — SIGNIFICANT CHANGE UP (ref 3.3–5)
ALP SERPL-CCNC: 122 U/L — HIGH (ref 40–120)
ALT FLD-CCNC: 27 U/L — SIGNIFICANT CHANGE UP (ref 12–78)
ANION GAP SERPL CALC-SCNC: 5 MMOL/L — SIGNIFICANT CHANGE UP (ref 5–17)
AST SERPL-CCNC: 37 U/L — SIGNIFICANT CHANGE UP (ref 15–37)
BASOPHILS # BLD AUTO: 0.1 K/UL — SIGNIFICANT CHANGE UP (ref 0–0.2)
BASOPHILS NFR BLD AUTO: 1.8 % — SIGNIFICANT CHANGE UP (ref 0–2)
BILIRUB SERPL-MCNC: 0.7 MG/DL — SIGNIFICANT CHANGE UP (ref 0.2–1.2)
BUN SERPL-MCNC: 23 MG/DL — SIGNIFICANT CHANGE UP (ref 7–23)
CALCIUM SERPL-MCNC: 8.5 MG/DL — SIGNIFICANT CHANGE UP (ref 8.5–10.1)
CHLORIDE SERPL-SCNC: 104 MMOL/L — SIGNIFICANT CHANGE UP (ref 96–108)
CHOLEST SERPL-MCNC: 108 MG/DL — SIGNIFICANT CHANGE UP (ref 10–199)
CO2 SERPL-SCNC: 28 MMOL/L — SIGNIFICANT CHANGE UP (ref 22–31)
CREAT SERPL-MCNC: 1.07 MG/DL — SIGNIFICANT CHANGE UP (ref 0.5–1.3)
EOSINOPHIL # BLD AUTO: 0.2 K/UL — SIGNIFICANT CHANGE UP (ref 0–0.5)
EOSINOPHIL NFR BLD AUTO: 4.2 % — SIGNIFICANT CHANGE UP (ref 0–6)
GLUCOSE BLDC GLUCOMTR-MCNC: 104 MG/DL — HIGH (ref 70–99)
GLUCOSE BLDC GLUCOMTR-MCNC: 105 MG/DL — HIGH (ref 70–99)
GLUCOSE BLDC GLUCOMTR-MCNC: 127 MG/DL — HIGH (ref 70–99)
GLUCOSE BLDC GLUCOMTR-MCNC: 87 MG/DL — SIGNIFICANT CHANGE UP (ref 70–99)
GLUCOSE SERPL-MCNC: 75 MG/DL — SIGNIFICANT CHANGE UP (ref 70–99)
HBA1C BLD-MCNC: 5.6 % — SIGNIFICANT CHANGE UP (ref 4–5.6)
HCT VFR BLD CALC: 32.2 % — LOW (ref 39–50)
HCT VFR BLD CALC: 33.4 % — LOW (ref 39–50)
HDLC SERPL-MCNC: 52 MG/DL — SIGNIFICANT CHANGE UP (ref 40–125)
HGB BLD-MCNC: 10.4 G/DL — LOW (ref 13–17)
HGB BLD-MCNC: 10.9 G/DL — LOW (ref 13–17)
INR BLD: 6.1 RATIO — CRITICAL HIGH (ref 0.88–1.16)
LIPID PNL WITH DIRECT LDL SERPL: 37 MG/DL — SIGNIFICANT CHANGE UP
LYMPHOCYTES # BLD AUTO: 2.1 K/UL — SIGNIFICANT CHANGE UP (ref 1–3.3)
LYMPHOCYTES # BLD AUTO: 37.1 % — SIGNIFICANT CHANGE UP (ref 13–44)
MCHC RBC-ENTMCNC: 32.4 GM/DL — SIGNIFICANT CHANGE UP (ref 32–36)
MCHC RBC-ENTMCNC: 33.4 PG — SIGNIFICANT CHANGE UP (ref 27–34)
MCV RBC AUTO: 102.9 FL — HIGH (ref 80–100)
MONOCYTES # BLD AUTO: 0.5 K/UL — SIGNIFICANT CHANGE UP (ref 0–0.9)
MONOCYTES NFR BLD AUTO: 9.8 % — SIGNIFICANT CHANGE UP (ref 2–14)
NEUTROPHILS # BLD AUTO: 2.6 K/UL — SIGNIFICANT CHANGE UP (ref 1.8–7.4)
NEUTROPHILS NFR BLD AUTO: 47 % — SIGNIFICANT CHANGE UP (ref 43–77)
PLATELET # BLD AUTO: 172 K/UL — SIGNIFICANT CHANGE UP (ref 150–400)
POTASSIUM SERPL-MCNC: 3.9 MMOL/L — SIGNIFICANT CHANGE UP (ref 3.5–5.3)
POTASSIUM SERPL-SCNC: 3.9 MMOL/L — SIGNIFICANT CHANGE UP (ref 3.5–5.3)
PROT SERPL-MCNC: 7.1 GM/DL — SIGNIFICANT CHANGE UP (ref 6–8.3)
PROTHROM AB SERPL-ACNC: 68.3 SEC — HIGH (ref 9.8–12.7)
RBC # BLD: 3.12 M/UL — LOW (ref 4.2–5.8)
RBC # FLD: 13.8 % — SIGNIFICANT CHANGE UP (ref 10.3–14.5)
SODIUM SERPL-SCNC: 137 MMOL/L — SIGNIFICANT CHANGE UP (ref 135–145)
TOTAL CHOLESTEROL/HDL RATIO MEASUREMENT: 2.1 RATIO — LOW (ref 3.4–9.6)
TRIGL SERPL-MCNC: 97 MG/DL — SIGNIFICANT CHANGE UP (ref 10–149)
WBC # BLD: 5.5 K/UL — SIGNIFICANT CHANGE UP (ref 3.8–10.5)
WBC # FLD AUTO: 5.5 K/UL — SIGNIFICANT CHANGE UP (ref 3.8–10.5)

## 2018-02-26 PROCEDURE — 73010 X-RAY EXAM OF SHOULDER BLADE: CPT | Mod: 26,LT

## 2018-02-26 RX ORDER — ALBUTEROL 90 UG/1
2.5 AEROSOL, METERED ORAL
Qty: 0 | Refills: 0 | Status: DISCONTINUED | OUTPATIENT
Start: 2018-02-26 | End: 2018-02-28

## 2018-02-26 RX ORDER — LIDOCAINE 4 G/100G
1 CREAM TOPICAL DAILY
Qty: 0 | Refills: 0 | Status: DISCONTINUED | OUTPATIENT
Start: 2018-02-26 | End: 2018-02-28

## 2018-02-26 RX ADMIN — SIMVASTATIN 40 MILLIGRAM(S): 20 TABLET, FILM COATED ORAL at 22:19

## 2018-02-26 RX ADMIN — Medication 120 MILLIGRAM(S): at 06:20

## 2018-02-26 RX ADMIN — CEFTRIAXONE 1000 MILLIGRAM(S): 500 INJECTION, POWDER, FOR SOLUTION INTRAMUSCULAR; INTRAVENOUS at 17:16

## 2018-02-26 RX ADMIN — Medication 0.12 MILLIGRAM(S): at 08:38

## 2018-02-26 RX ADMIN — Medication 75 MICROGRAM(S): at 06:20

## 2018-02-26 RX ADMIN — AZITHROMYCIN 250 MILLIGRAM(S): 500 TABLET, FILM COATED ORAL at 11:08

## 2018-02-26 RX ADMIN — SIMVASTATIN 40 MILLIGRAM(S): 20 TABLET, FILM COATED ORAL at 01:37

## 2018-02-26 RX ADMIN — ALBUTEROL 2.5 MILLIGRAM(S): 90 AEROSOL, METERED ORAL at 22:45

## 2018-02-26 RX ADMIN — Medication 50 MILLIGRAM(S): at 08:38

## 2018-02-26 RX ADMIN — LIDOCAINE 1 PATCH: 4 CREAM TOPICAL at 18:37

## 2018-02-26 NOTE — ED ADULT NURSE REASSESSMENT NOTE - COMFORT CARE
hourly rounding completed/side rails up
urinal given. call bell given. hourly rounding completed/assisted to bathroom/side rails up
meal provided
wait time explained/side rails up/plan of care explained

## 2018-02-26 NOTE — PROGRESS NOTE ADULT - ASSESSMENT
89 year old male with past medical history of Atrial Fibrillation, CAD s/p CABG (1988) s/p Stent, DM HTN, HLD, presenting with complaints of CP and found to have LLL Pneumonia, Fracture of the Scapula and 2nd-10th Ribs.     # CAP  - CT Chest: Left Lower Lobe Infiltrate  - monitor Pulse ox, supplement O2 PRN    - Incentive spirometry   - Continue with Azithromycin and Ceftriaxone  - Follow-up Blood Cultures   - Sputum Cx     #  Subacute Fracture of the Left Scapula, 2nd-10th Ribs.  - Orthopedic Consult appreciated   - C/w  Pain meds, add Lidoderm patch   - PT     #CAD s/p CABG s/p Stent  - CP secondary to rib fracture  - Trop negative x 2  - Continue BB, Statin    #Atrial Fibrillation. Coagulopathy   - [id number] = 4  - Continue Cardizem and Metoprolol   - INR supratheraputic, no signs of acute bleeding  - Monitor H/H closely  - Hold coumadin   - Cardio eval appreciated         #HTN  - BP Stable  - Continue Toprol and Cardizem  - Hold Lasix    #HLD  - Lipid panel for AM  - Continue Simvastatin    #Hypothyroidism  - f/u  TSH   - Continue Levothyroxine    #DM  - Hold oral hypoglycemics  - HbA1c noted   - ISS    DVT Prophylaxis: Supratherapeutic INR

## 2018-02-26 NOTE — ED ADULT NURSE REASSESSMENT NOTE - GENERAL PATIENT STATE
cooperative/resting/sleeping/comfortable appearance
resting/sleeping/comfortable appearance/cooperative
comfortable appearance
comfortable appearance

## 2018-02-26 NOTE — CONSULT NOTE ADULT - SUBJECTIVE AND OBJECTIVE BOX
Consult called [phone]  Consult seen [phone]    HPI  right hand dominant 89M found to have subacute left scapula fracture. Patient admitted with chest pain. Patient denies numbness, paresthesias, headstrike, loss of consciousness, recent trauma, or any other signs/symptoms at this time. Patient states that he fell 2 weeks prior, for which he was evaluated.    Allergies: NKDA  PMH: Atrial Fibrillation, CAD, DM HTN, HLD,  PSH: CABG, stent  Social: Denies tobacco use  FH: Noncontributory  Imaging: XR left scapula - pending  CT CAP - per radiologist subacute left scapula fracture    ROS: Negative for all systems except as noted above in HPI    Physical exam  VS: Afebrile, vital signs stable  Gen: NAD  left UE: Skin intact. No erythema/ecchymosis/warmth. No TTP bony prominences at Shoulder/Elbow/Hand/Fingers with full painless AROM at baseline per patient, +AIN/PIN/MN/RN/UN/MCN/AxN. SILT C5-T1. +RA, capillary refill brisk. Compartments soft and nontender.  Secondary Survey: No TTP bony prominences with full painless AROM at baseline per patient. SILT. Capillary refill brisk. Able to BL SLR. Negative BL log roll. No TTP axial spine.      89M with subacute left scapula fracture    FU XR left scapula  WBAT  Pain control  Sling PRN for comfort  No planned orthopedic intervention at this time  Ortho stable for discharge  Follow up in office within 5 days of discharge with Dr Manzo  Will discuss with attending and advise if change

## 2018-02-26 NOTE — CONSULT NOTE ADULT - SUBJECTIVE AND OBJECTIVE BOX
Patient is a 89y old  Male who presents with a chief complaint of CC: Chest Pain x 1 day (25 Feb 2018 21:10)      HPI:  89 year old male with past medical history of Atrial Fibrillation, CAD s/p CABG (1988) s/p Stent, DM HTN, HLD, presenting with complaints of CP x 1 day. Chest Pain started last night while patient was in bed. Chest Pain is located beneath the left subcostal ribs without radiation, occurring intermittently with episodes lasting seconds, and described as sharp. Denies Fever, Cough, N/V, SOB, Diaphoresis, or ABD Pain. Patient was sustained a Fall on January 13th and fell on his back. He was evaluated in the ED at the time and imaging studies report no fractures. Patient presents to the ED due to concerns that his CP may be cardiac related.    In the ED, CT Chest/ABD/Pelvis reveals LLL PNA, subacute comminuted fracture of the LEFT scapula and subacute fractures of the LEFT 2nd-10th ribs. Patient was treated with Azithromycin and Ceftriaxone in ED. (25 Feb 2018 21:10)      PAST MEDICAL & SURGICAL HISTORY:  Diabetes mellitus  Hyperlipidemia  Hypertension  Coronary artery disease s/p CABG  Pacemaker  Afib  Stented coronary artery      PREVIOUS DIAGNOSTIC TESTING:      ECHO  FINDINGS:    STRESS  FINDINGS:    CATHETERIZATION  FINDINGS:    MEDICATIONS  (STANDING):  azithromycin   Tablet 250 milliGRAM(s) Oral daily  cefTRIAXone Injectable. 1000 milliGRAM(s) IV Push <User Schedule>  dextrose 5%. 1000 milliLiter(s) (50 mL/Hr) IV Continuous <Continuous>  dextrose 50% Injectable 12.5 Gram(s) IV Push once  dextrose 50% Injectable 25 Gram(s) IV Push once  dextrose 50% Injectable 25 Gram(s) IV Push once  digoxin     Tablet 0.125 milliGRAM(s) Oral daily  diltiazem    milliGRAM(s) Oral daily  insulin lispro (HumaLOG) corrective regimen sliding scale   SubCutaneous three times a day before meals  insulin lispro (HumaLOG) corrective regimen sliding scale   SubCutaneous at bedtime  levothyroxine 75 MICROGram(s) Oral daily  metoprolol succinate ER 50 milliGRAM(s) Oral daily  simvastatin 40 milliGRAM(s) Oral at bedtime    MEDICATIONS  (PRN):  acetaminophen   Tablet 650 milliGRAM(s) Oral every 6 hours PRN For Temp greater than 38 C (100.4 F)  acetaminophen   Tablet. 650 milliGRAM(s) Oral every 6 hours PRN Mild Pain (1 - 3)  dextrose Gel 1 Dose(s) Oral once PRN Blood Glucose LESS THAN 70 milliGRAM(s)/deciliter  glucagon  Injectable 1 milliGRAM(s) IntraMuscular once PRN Glucose LESS THAN 70 milligrams/deciliter  oxyCODONE    5 mG/acetaminophen 325 mG 1 Tablet(s) Oral every 6 hours PRN Severe Pain (7 - 10)      FAMILY HISTORY:  No pertinent family history in first degree relatives      SOCIAL HISTORY:  nonsmoker    REVIEW OF SYSTEM:  Pertinent items are noted in HPI.    HEENT:  Neck:   Respiratory:     Cardiovascular: CAD, Atrial fib, perm pacemaker  Gastrointestinal:  Genitourinary:   Skin:    Musculoskeletal:  Neurological: negative for dizziness, headaches, seizures and tremors  Psychiatric:  Endocrine: diabetes mellitus, hyperthyroidism  Hematlogical:  Vital Signs Last 24 Hrs  T(C): 36.4 (26 Feb 2018 12:46), Max: 36.9 (26 Feb 2018 03:46)  T(F): 97.6 (26 Feb 2018 12:46), Max: 98.5 (26 Feb 2018 03:46)  HR: 59 (26 Feb 2018 12:46) (59 - 70)  BP: 132/70 (26 Feb 2018 12:46) (128/59 - 140/74)  BP(mean): --  RR: 17 (26 Feb 2018 12:46) (16 - 18)  SpO2: 100% (26 Feb 2018 12:46) (97% - 100%)    I&O's Summary    PHYSICAL EXAM  General Appearance: cooperative, no acute distress,   HEENT: PERRL, conjunctiva clear, EOM's intact, non injected pharynx, no exudate, TM   normal  Neck: Supple, , no adenopathy, thyroid: not enlarged, no carotid bruit or JVD  Back: Symmetric, no  tenderness,no soft tissue tenderness  Lungs: decreased breath sound left base with left midlung rhonchi  expiratory phase  Heart: Regular rate and rhythm, S1, S2 2/6 syst murmur LLSB  Abdomen: Soft, non-tender, bowel sounds active , no hepatosplenomegaly  Extremities:2+ edema bilat  No DP/PT bilat  Skin: Skin color, texture normal, no rashes   Neurologic: Alert and oriented X3 , cranial nerves intact, sensory and motor normal,        INTERPRETATION OF TELEMETRY:    ECG: ventricular paced 85/min        LABS:                          10.4   5.5   )-----------( 172      ( 26 Feb 2018 08:09 )             32.2     02-26    137  |  104  |  23  ----------------------------<  75  3.9   |  28  |  1.07    Ca    8.5      26 Feb 2018 08:09  Mg     2.2     02-25    TPro  7.1  /  Alb  3.4  /  TBili  0.7  /  DBili  x   /  AST  37  /  ALT  27  /  AlkPhos  122<H>  02-26    CARDIAC MARKERS ( 25 Feb 2018 17:32 )  <0.015 ng/mL / x     / x     / x     / x      CARDIAC MARKERS ( 25 Feb 2018 15:36 )  <0.015 ng/mL / x     / x     / x     / x          Lipid Panel  108  52  37  97      PT/INR - ( 26 Feb 2018 08:09 )   PT: 68.3 sec;   INR: 6.10 ratio                   RADIOLOGY & ADDITIONAL STUDIES:    IMPRESSION: 1.  Multiple left rib fractures and scapular fracture. with LLL pneumonia  2. CAD, s/p CABG, stents- stable  3. Atrial fibrillation-now with overantioagulation with  warfarin  4. Diabtetes mellitus  5. Hypothyroidism    Plan- agree with antibiotics, hold warfarin and continue digoxin, metoprolol, diltiazem, statin and levothyroxine   We will follow

## 2018-02-26 NOTE — PROGRESS NOTE ADULT - SUBJECTIVE AND OBJECTIVE BOX
CC: CC: Chest Pain x 1 day (25 Feb 2018 21:10)    HPI:  89 year old male with past medical history of Atrial Fibrillation, CAD s/p CABG (1988) s/p Stent, DM HTN, HLD, presenting with complaints of CP x 1 day. Chest Pain started last night while patient was in bed. Chest Pain is located beneath the left subcostal ribs without radiation, occurring intermittently with episodes lasting seconds, and described as sharp. Denies Fever, Cough, N/V, SOB, Diaphoresis, or ABD Pain. Patient was sustained a Fall on January 13th and fell on his back. He was evaluated in the ED at the time and imaging studies report no fractures. Patient presents to the ED due to concerns that his CP may be cardiac related.    In the ED, CT Chest/ABD/Pelvis reveals LLL PNA, subacute comminuted fracture of the LEFT scapula and subacute fractures of the LEFT 2nd-10th ribs. Patient was treated with Azithromycin and Ceftriaxone in ED. (25 Feb 2018 21:10)    INTERVAL HPI/ OVERNIGHT EVENTS: Chart reviewed, Pt was seen and examined,  poor historian, reports no SOB , + cough with phlegm. Pain is mostly at site of scapular, and with deep breathing. No fevers     Vital Signs Last 24 Hrs  T(C): 36.4 (26 Feb 2018 12:46), Max: 36.9 (26 Feb 2018 03:46)  T(F): 97.6 (26 Feb 2018 12:46), Max: 98.5 (26 Feb 2018 03:46)  HR: 59 (26 Feb 2018 12:46) (59 - 70)  BP: 132/70 (26 Feb 2018 12:46) (128/59 - 140/74)  RR: 17 (26 Feb 2018 12:46) (16 - 18)  SpO2: 100% (26 Feb 2018 12:46) (97% - 100%)    REVIEW OF SYSTEMS:  All other review of systems is negative unless indicated above.      PHYSICAL EXAM:    General: Well developed; well nourished; in no acute distress  Eyes: PERRLA, EOMI; conjunctiva and sclera clear  Head: Normocephalic; atraumatic  ENMT: No nasal discharge; airway clear  Neck: Supple; non tender; no masses  Chest: + L lower  rib cage tenderness to palpation   Respiratory: Decreased BS at bases b/l.  No wheezes, rales or rhonchi  Cardiovascular: Regular rate and rhythm. S1 and S2 Normal; No murmurs  Gastrointestinal: Soft non-tender non-distended; Normal bowel sounds  Genitourinary: No costovertebral angle tenderness, no suprapubic fullness   Extremities: Preserved  range of motion, No  edema  Vascular: Peripheral pulses palpable 2+ bilaterally  Neurological: Alert and oriented x 3  Skin: Warm and dry. No acute rash  Lymph Nodes: No acute cervical adenopathy  Musculoskeletal: Normal muscle  tone, without deformities  Psychiatric: Cooperative and appropriate        LABS:       CARDIAC MARKERS ( 25 Feb 2018 17:32 )  <0.015 ng/mL / x     / x     / x     / x      CARDIAC MARKERS ( 25 Feb 2018 15:36 )  <0.015 ng/mL / x     / x     / x     / x                                10.4   5.5   )-----------( 172      ( 26 Feb 2018 08:09 )             32.2     26 Feb 2018 08:09    137    |  104    |  23     ----------------------------<  75     3.9     |  28     |  1.07     Ca    8.5        26 Feb 2018 08:09  Mg     2.2       25 Feb 2018 15:36    TPro  7.1    /  Alb  3.4    /  TBili  0.7    /  DBili  x      /  AST  37     /  ALT  27     /  AlkPhos  122    26 Feb 2018 08:09    PT/INR - ( 26 Feb 2018 08:09 )   PT: 68.3 sec;   INR: 6.10 ratio           CAPILLARY BLOOD GLUCOSE      POCT Blood Glucose.: 105 mg/dL (26 Feb 2018 11:07)  POCT Blood Glucose.: 87 mg/dL (26 Feb 2018 07:39)  POCT Blood Glucose.: 91 mg/dL (25 Feb 2018 23:46)    LIVER FUNCTIONS - ( 26 Feb 2018 08:09 )  Alb: 3.4 g/dL / Pro: 7.1 gm/dL / ALK PHOS: 122 U/L / ALT: 27 U/L / AST: 37 U/L / GGT: x             Hemoglobin A1C, Whole Blood: 5.6 % (02-26-18 @ 08:09)    MEDICATIONS  (STANDING):  azithromycin   Tablet 250 milliGRAM(s) Oral daily  cefTRIAXone Injectable. 1000 milliGRAM(s) IV Push <User Schedule>  dextrose 5%. 1000 milliLiter(s) (50 mL/Hr) IV Continuous <Continuous>  dextrose 50% Injectable 12.5 Gram(s) IV Push once  dextrose 50% Injectable 25 Gram(s) IV Push once  dextrose 50% Injectable 25 Gram(s) IV Push once  digoxin     Tablet 0.125 milliGRAM(s) Oral daily  diltiazem    milliGRAM(s) Oral daily  insulin lispro (HumaLOG) corrective regimen sliding scale   SubCutaneous three times a day before meals  insulin lispro (HumaLOG) corrective regimen sliding scale   SubCutaneous at bedtime  levothyroxine 75 MICROGram(s) Oral daily  lidocaine   Patch 1 Patch Transdermal daily  metoprolol succinate ER 50 milliGRAM(s) Oral daily  simvastatin 40 milliGRAM(s) Oral at bedtime    MEDICATIONS  (PRN):  acetaminophen   Tablet 650 milliGRAM(s) Oral every 6 hours PRN For Temp greater than 38 C (100.4 F)  acetaminophen   Tablet. 650 milliGRAM(s) Oral every 6 hours PRN Mild Pain (1 - 3)  dextrose Gel 1 Dose(s) Oral once PRN Blood Glucose LESS THAN 70 milliGRAM(s)/deciliter  glucagon  Injectable 1 milliGRAM(s) IntraMuscular once PRN Glucose LESS THAN 70 milligrams/deciliter  oxyCODONE    5 mG/acetaminophen 325 mG 1 Tablet(s) Oral every 6 hours PRN Severe Pain (7 - 10)      RADIOLOGY & ADDITIONAL TESTS:    EXAM:  SCAPULA  COMPLETE-LEFT                          PROCEDURE DATE:  02/26/2018    INTERPRETATION:  Exam Date: 2/26/2018 2:40 AM           Findings/  impression:    Previously visualized left inferior scapular fracture is identified,   appearing more displaced than on prior exam.    Small left pleural effusion is suggested.    Median sternotomy and left chest wall pacemaker in place.      EXAM:  XR KNEE 3 VIEWS LT                        PROCEDURE DATE:  02/25/2018    INTERPRETATION:    Radiographs of the LEFT knee         CLINICAL INFORMATION:  Fall Pain.        FINDINGS:  No prior exams are available for comparison.    No fracture is seen.  No joint effusion is found.  No loose body is   identified.  The joint spaces are preserved.  No significant productive   changes or other cortical ortrabecular abnormalities are recognized.    The soft tissues show prepatellar soft tissue swelling. Extensive   vascular calcification is seen.    IMPRESSION:   There is no fracture or dislocation. Mild prepatellar soft   tissue swelling is noted.        EXAM:  CT PELVIS BONY ONLY                          EXAM:  CT 3D RECONSTRUCT W SHARMILA                            PROCEDURE DATE:  02/25/2018          INTERPRETATION:  Clinical indication: Hip pain and recent fall. Rule out   fracture.    Technique: CT axial images of the pelvis were obtained without   intravenous contrast. Coronal and sagittal reformatted images were also   obtained. 3-D volume rendering images were obtained from a separate   workstation  .    Comparison: Earlier radiographsof the same date.     Findings:    Bones: Osteopenia. No obvious fracture or dislocation. Bilateral hip   osteoarthrosis. Degenerative changes of the visualized lower lumbar spine   and sacroiliac joints.     Soft tissue: No hematoma in the visualized soft tissue. No significant   hip joint effusion. No significant muscle bulk loss or fatty replacement.     Miscellaneous: Visualized pelvic organs are unremarkable.         Impression: Diffuse osteopenia. No acute fracture in the pelvic skeleton.    No fracture or dislocation.Osteopenia without obvious fracture or   dislocation. If there is continued clinical suspicion, MRI may be   obtained for further evaluation.      EXAM:  CT CHEST                        PROCEDURE DATE:  02/25/2018    INTERPRETATION:      CT chest without contrast       CLINICAL INFORMATION:  LEFT chest wall pain  Pneumonia.  FINDINGS:   CT chest dated 6/10/2012 available for review.    The thyroid gland appears intact.    The lungs are significant for a LEFT lower lobe infiltrate. Subsegmental   atelectasis seen is seen in the RIGHT lower lobe.. .  No pleural fluid is   seen.  The trachea and major bronchi are patent.    No enlarged axillary lymph nodes are found.   No enlarged hilar lymph   nodes are recognized, allowing for the noncontrasttechnique.  Within the   mediastinum no pathologic lymph nodes are found.  The heart size is   normal.   The mediastinum, chest wall and thoracic spine demonstrate a   subacute comminuted fracture of the LEFT scapula as well as subacute   fractures of the LEFT second through 10th ribs.. Degenerative changes of   the spine are noted.    Limited evaluation of the upper abdomen is significant for scattered   vascular calcification. Bilateral renal cysts are noted. Bilateral   nephrolithiasis is noted. Aortic ectasia of the infrarenal abdominal   aorta.    IMPRESSION: LEFT lower lobe infiltrate. Subsegmental atelectasis seen is   seen in the RIGHT lower lobe..     subacute comminuted fracture of the   LEFT scapula as well as subacute fractures ofthe LEFT second through   10th ribs

## 2018-02-27 LAB
ANION GAP SERPL CALC-SCNC: 6 MMOL/L — SIGNIFICANT CHANGE UP (ref 5–17)
APPEARANCE UR: CLEAR — SIGNIFICANT CHANGE UP
BILIRUB UR-MCNC: NEGATIVE — SIGNIFICANT CHANGE UP
BUN SERPL-MCNC: 19 MG/DL — SIGNIFICANT CHANGE UP (ref 7–23)
CALCIUM SERPL-MCNC: 8.4 MG/DL — LOW (ref 8.5–10.1)
CHLORIDE SERPL-SCNC: 107 MMOL/L — SIGNIFICANT CHANGE UP (ref 96–108)
CO2 SERPL-SCNC: 25 MMOL/L — SIGNIFICANT CHANGE UP (ref 22–31)
COLOR SPEC: YELLOW — SIGNIFICANT CHANGE UP
CREAT SERPL-MCNC: 0.9 MG/DL — SIGNIFICANT CHANGE UP (ref 0.5–1.3)
DIFF PNL FLD: (no result)
EPI CELLS # UR: SIGNIFICANT CHANGE UP
GLUCOSE BLDC GLUCOMTR-MCNC: 163 MG/DL — HIGH (ref 70–99)
GLUCOSE BLDC GLUCOMTR-MCNC: 201 MG/DL — HIGH (ref 70–99)
GLUCOSE BLDC GLUCOMTR-MCNC: 84 MG/DL — SIGNIFICANT CHANGE UP (ref 70–99)
GLUCOSE BLDC GLUCOMTR-MCNC: 99 MG/DL — SIGNIFICANT CHANGE UP (ref 70–99)
GLUCOSE SERPL-MCNC: 83 MG/DL — SIGNIFICANT CHANGE UP (ref 70–99)
GLUCOSE UR QL: NEGATIVE MG/DL — SIGNIFICANT CHANGE UP
HCT VFR BLD CALC: 32.9 % — LOW (ref 39–50)
HGB BLD-MCNC: 10.9 G/DL — LOW (ref 13–17)
INR BLD: 5.47 RATIO — CRITICAL HIGH (ref 0.88–1.16)
KETONES UR-MCNC: NEGATIVE — SIGNIFICANT CHANGE UP
LEUKOCYTE ESTERASE UR-ACNC: NEGATIVE — SIGNIFICANT CHANGE UP
MCHC RBC-ENTMCNC: 33 GM/DL — SIGNIFICANT CHANGE UP (ref 32–36)
MCHC RBC-ENTMCNC: 34.2 PG — HIGH (ref 27–34)
MCV RBC AUTO: 103.4 FL — HIGH (ref 80–100)
NITRITE UR-MCNC: NEGATIVE — SIGNIFICANT CHANGE UP
PH UR: 7 — SIGNIFICANT CHANGE UP (ref 5–8)
PLATELET # BLD AUTO: 156 K/UL — SIGNIFICANT CHANGE UP (ref 150–400)
POTASSIUM SERPL-MCNC: 4 MMOL/L — SIGNIFICANT CHANGE UP (ref 3.5–5.3)
POTASSIUM SERPL-SCNC: 4 MMOL/L — SIGNIFICANT CHANGE UP (ref 3.5–5.3)
PROT UR-MCNC: NEGATIVE MG/DL — SIGNIFICANT CHANGE UP
PROTHROM AB SERPL-ACNC: 61.2 SEC — HIGH (ref 9.8–12.7)
RBC # BLD: 3.18 M/UL — LOW (ref 4.2–5.8)
RBC # FLD: 13.8 % — SIGNIFICANT CHANGE UP (ref 10.3–14.5)
RBC CASTS # UR COMP ASSIST: (no result) /HPF (ref 0–4)
SODIUM SERPL-SCNC: 138 MMOL/L — SIGNIFICANT CHANGE UP (ref 135–145)
SP GR SPEC: 1.01 — SIGNIFICANT CHANGE UP (ref 1.01–1.02)
UROBILINOGEN FLD QL: NEGATIVE MG/DL — SIGNIFICANT CHANGE UP
WBC # BLD: 5.6 K/UL — SIGNIFICANT CHANGE UP (ref 3.8–10.5)
WBC # FLD AUTO: 5.6 K/UL — SIGNIFICANT CHANGE UP (ref 3.8–10.5)
WBC UR QL: NEGATIVE — SIGNIFICANT CHANGE UP

## 2018-02-27 RX ADMIN — SIMVASTATIN 40 MILLIGRAM(S): 20 TABLET, FILM COATED ORAL at 20:48

## 2018-02-27 RX ADMIN — Medication 2: at 11:45

## 2018-02-27 RX ADMIN — CEFTRIAXONE 1000 MILLIGRAM(S): 500 INJECTION, POWDER, FOR SOLUTION INTRAMUSCULAR; INTRAVENOUS at 18:03

## 2018-02-27 RX ADMIN — LIDOCAINE 1 PATCH: 4 CREAM TOPICAL at 11:44

## 2018-02-27 RX ADMIN — Medication 120 MILLIGRAM(S): at 05:52

## 2018-02-27 RX ADMIN — ALBUTEROL 2.5 MILLIGRAM(S): 90 AEROSOL, METERED ORAL at 09:13

## 2018-02-27 RX ADMIN — Medication 50 MILLIGRAM(S): at 05:52

## 2018-02-27 RX ADMIN — LIDOCAINE 1 PATCH: 4 CREAM TOPICAL at 05:53

## 2018-02-27 RX ADMIN — AZITHROMYCIN 250 MILLIGRAM(S): 500 TABLET, FILM COATED ORAL at 11:45

## 2018-02-27 RX ADMIN — Medication 75 MICROGRAM(S): at 05:52

## 2018-02-27 RX ADMIN — Medication 0.12 MILLIGRAM(S): at 05:52

## 2018-02-27 NOTE — PROGRESS NOTE ADULT - SUBJECTIVE AND OBJECTIVE BOX
CC: CC: Chest Pain x 1 day (25 Feb 2018 21:10)    HPI:  89 year old male with past medical history of Atrial Fibrillation, CAD s/p CABG (1988) s/p Stent, DM HTN, HLD, presenting with complaints of CP x 1 day. Chest Pain started last night while patient was in bed. Chest Pain is located beneath the left subcostal ribs without radiation, occurring intermittently with episodes lasting seconds, and described as sharp. Denies Fever, Cough, N/V, SOB, Diaphoresis, or ABD Pain. Patient was sustained a Fall on January 13th and fell on his back. He was evaluated in the ED at the time and imaging studies report no fractures. Patient presents to the ED due to concerns that his CP may be cardiac related.    In the ED, CT Chest/ABD/Pelvis reveals LLL PNA, subacute comminuted fracture of the LEFT scapula and subacute fractures of the LEFT 2nd-10th ribs. Patient was treated with Azithromycin and Ceftriaxone in ED. (25 Feb 2018 21:10)    INTERVAL HPI/ OVERNIGHT EVENTS:  Pt was seen and examined,  poor historian, pleasantly confused,  reports no SOB , cough improving, no pain.  No fevers. Had PT today. Results of labs and d/c planning discussed.     Vital Signs Last 24 Hrs  T(C): 36.5 (27 Feb 2018 16:37), Max: 36.5 (27 Feb 2018 16:37)  T(F): 97.7 (27 Feb 2018 16:37), Max: 97.7 (27 Feb 2018 16:37)  HR: 70 (27 Feb 2018 16:37) (66 - 82)  BP: 116/69 (27 Feb 2018 16:37) (110/53 - 143/60)  RR: 18 (27 Feb 2018 16:37) (18 - 18)  SpO2: 97% (27 Feb 2018 16:37) (97% - 98%)    REVIEW OF SYSTEMS:  All other review of systems is negative unless indicated above.      PHYSICAL EXAM:    General: Well developed; well nourished; in no acute distress  Eyes: PERRLA, EOMI; conjunctiva and sclera clear  Head: Normocephalic; atraumatic  ENMT: No nasal discharge; airway clear  Neck: Supple; non tender; no masses  Chest: + L lower  rib cage tenderness to palpation   Respiratory: Decreased BS at bases b/l.  No wheezes, rales or rhonchi  Cardiovascular: Regular rate and rhythm. S1 and S2 Normal; No murmurs  Gastrointestinal: Soft non-tender non-distended; Normal bowel sounds  Genitourinary: No costovertebral angle tenderness, no suprapubic fullness   Extremities: Preserved  range of motion, No  edema  Vascular: Peripheral pulses palpable 2+ bilaterally  Neurological: Alert and oriented x 3  Skin: Warm and dry. No acute rash  Lymph Nodes: No acute cervical adenopathy  Musculoskeletal: Normal muscle  tone, without deformities  Psychiatric: Cooperative and appropriate        LABS:                         10.9   5.6   )-----------( 156      ( 27 Feb 2018 07:46 )             32.9     02-27    138  |  107  |  19  ----------------------------<  83  4.0   |  25  |  0.90    Ca    8.4<L>      27 Feb 2018 07:46    TPro  7.1  /  Alb  3.4  /  TBili  0.7  /  DBili  x   /  AST  37  /  ALT  27  /  AlkPhos  122<H>  02-26    LIVER FUNCTIONS - ( 26 Feb 2018 08:09 )  Alb: 3.4 g/dL / Pro: 7.1 gm/dL / ALK PHOS: 122 U/L / ALT: 27 U/L / AST: 37 U/L / GGT: x           PT/INR - ( 27 Feb 2018 07:46 )   PT: 61.2 sec;   INR: 5.47 ratio          CARDIAC MARKERS ( 25 Feb 2018 17:32 )  <0.015 ng/mL / x     / x     / x     / x      CARDIAC MARKERS ( 25 Feb 2018 15:36 )  <0.015 ng/mL / x     / x     / x     / x                                10.4   5.5   )-----------( 172      ( 26 Feb 2018 08:09 )             32.2     26 Feb 2018 08:09    137    |  104    |  23     ----------------------------<  75     3.9     |  28     |  1.07     Ca    8.5        26 Feb 2018 08:09  Mg     2.2       25 Feb 2018 15:36    TPro  7.1    /  Alb  3.4    /  TBili  0.7    /  DBili  x      /  AST  37     /  ALT  27     /  AlkPhos  122    26 Feb 2018 08:09    PT/INR - ( 26 Feb 2018 08:09 )   PT: 68.3 sec;   INR: 6.10 ratio           CAPILLARY BLOOD GLUCOSE      POCT Blood Glucose.: 105 mg/dL (26 Feb 2018 11:07)  POCT Blood Glucose.: 87 mg/dL (26 Feb 2018 07:39)  POCT Blood Glucose.: 91 mg/dL (25 Feb 2018 23:46)    LIVER FUNCTIONS - ( 26 Feb 2018 08:09 )  Alb: 3.4 g/dL / Pro: 7.1 gm/dL / ALK PHOS: 122 U/L / ALT: 27 U/L / AST: 37 U/L / GGT: x             Hemoglobin A1C, Whole Blood: 5.6 % (02-26-18 @ 08:09)    Culture - Blood (02.25.18 @ 19:25)    Specimen Source: .Blood Blood-Peripheral    Culture Results:   No growth to date.    Culture - Blood (02.25.18 @ 19:25)    Specimen Source: .Blood Blood-Peripheral    Culture Results:   No growth to date.        MEDICATIONS  (STANDING):  azithromycin   Tablet 250 milliGRAM(s) Oral daily  cefTRIAXone Injectable. 1000 milliGRAM(s) IV Push <User Schedule>  dextrose 5%. 1000 milliLiter(s) (50 mL/Hr) IV Continuous <Continuous>  dextrose 50% Injectable 12.5 Gram(s) IV Push once  dextrose 50% Injectable 25 Gram(s) IV Push once  dextrose 50% Injectable 25 Gram(s) IV Push once  digoxin     Tablet 0.125 milliGRAM(s) Oral daily  diltiazem    milliGRAM(s) Oral daily  insulin lispro (HumaLOG) corrective regimen sliding scale   SubCutaneous three times a day before meals  insulin lispro (HumaLOG) corrective regimen sliding scale   SubCutaneous at bedtime  levothyroxine 75 MICROGram(s) Oral daily  lidocaine   Patch 1 Patch Transdermal daily  metoprolol succinate ER 50 milliGRAM(s) Oral daily  simvastatin 40 milliGRAM(s) Oral at bedtime    MEDICATIONS  (PRN):  acetaminophen   Tablet 650 milliGRAM(s) Oral every 6 hours PRN For Temp greater than 38 C (100.4 F)  acetaminophen   Tablet. 650 milliGRAM(s) Oral every 6 hours PRN Mild Pain (1 - 3)  dextrose Gel 1 Dose(s) Oral once PRN Blood Glucose LESS THAN 70 milliGRAM(s)/deciliter  glucagon  Injectable 1 milliGRAM(s) IntraMuscular once PRN Glucose LESS THAN 70 milligrams/deciliter  oxyCODONE    5 mG/acetaminophen 325 mG 1 Tablet(s) Oral every 6 hours PRN Severe Pain (7 - 10)      RADIOLOGY & ADDITIONAL TESTS:    EXAM:  SCAPULA  COMPLETE-LEFT                          PROCEDURE DATE:  02/26/2018    INTERPRETATION:  Exam Date: 2/26/2018 2:40 AM           Findings/  impression:    Previously visualized left inferior scapular fracture is identified,   appearing more displaced than on prior exam.    Small left pleural effusion is suggested.    Median sternotomy and left chest wall pacemaker in place.      EXAM:  XR KNEE 3 VIEWS LT                        PROCEDURE DATE:  02/25/2018    INTERPRETATION:    Radiographs of the LEFT knee         CLINICAL INFORMATION:  Fall Pain.        FINDINGS:  No prior exams are available for comparison.    No fracture is seen.  No joint effusion is found.  No loose body is   identified.  The joint spaces are preserved.  No significant productive   changes or other cortical ortrabecular abnormalities are recognized.    The soft tissues show prepatellar soft tissue swelling. Extensive   vascular calcification is seen.    IMPRESSION:   There is no fracture or dislocation. Mild prepatellar soft   tissue swelling is noted.        EXAM:  CT PELVIS BONY ONLY                          EXAM:  CT 3D RECONSTRUCT [facility]                            PROCEDURE DATE:  02/25/2018          INTERPRETATION:  Clinical indication: Hip pain and recent fall. Rule out   fracture.    Technique: CT axial images of the pelvis were obtained without   intravenous contrast. Coronal and sagittal reformatted images were also   obtained. 3-D volume rendering images were obtained from a separate   workstation  .    Comparison: Earlier radiographs of the same date.     Findings:    Bones: Osteopenia. No obvious fracture or dislocation. Bilateral hip   osteoarthrosis. Degenerative changes of the visualized lower lumbar spine   and sacroiliac joints.     Soft tissue: No hematoma in the visualized soft tissue. No significant   hip joint effusion. No significant muscle bulk loss or fatty replacement.     Miscellaneous: Visualized pelvic organs are unremarkable.         Impression: Diffuse osteopenia. No acute fracture in the pelvic skeleton.    No fracture or dislocation. Osteopenia without obvious fracture or   dislocation. If there is continued clinical suspicion, MRI may be   obtained for further evaluation.      EXAM:  CT CHEST                        PROCEDURE DATE:  02/25/2018    INTERPRETATION:      CT chest without contrast       CLINICAL INFORMATION:  LEFT chest wall pain  Pneumonia.  FINDINGS:   CT chest dated 6/10/2012 available for review.    The thyroid gland appears intact.    The lungs are significant for a LEFT lower lobe infiltrate. Subsegmental   atelectasis seen is seen in the RIGHT lower lobe.. .  No pleural fluid is   seen.  The trachea and major bronchi are patent.    No enlarged axillary lymph nodes are found.   No enlarged hilar lymph   nodes are recognized, allowing for the noncontrast technique.  Within the   mediastinum no pathologic lymph nodes are found.  The heart size is   normal.   The mediastinum, chest wall and thoracic spine demonstrate a   subacute comminuted fracture of the LEFT scapula as well as subacute   fractures of the LEFT second through 10th ribs.. Degenerative changes of   the spine are noted.    Limited evaluation of the upper abdomen is significant for scattered   vascular calcification. Bilateral renal cysts are noted. Bilateral   nephrolithiasis is noted. Aortic ectasia of the infrarenal abdominal   aorta.    IMPRESSION: LEFT lower lobe infiltrate. Subsegmental atelectasis seen is   seen in the RIGHT lower lobe..     subacute comminuted fracture of the   LEFT scapula as well as subacute fractures of the LEFT second through   10th ribs

## 2018-02-27 NOTE — PHYSICAL THERAPY INITIAL EVALUATION ADULT - MODALITIES TREATMENT COMMENTS
pt left seated in chair post Eval; chair alarm donned; ; callbell in reach; pt instructed not to get up alone; call nursing for assist; dalila well; denied pain; RN Nikia made aware pt OOB

## 2018-02-27 NOTE — PROGRESS NOTE ADULT - SUBJECTIVE AND OBJECTIVE BOX
Patient is a 89y old  Male who presents with a chief complaint of CC: Chest Pain x 1 day (25 Feb 2018 21:10)      HPI:  89 year old male with past medical history of Atrial Fibrillation, CAD s/p CABG (1988) s/p Stent, DM HTN, HLD, presenting with complaints of CP x 1 day. Chest Pain started last night while patient was in bed. Chest Pain is located beneath the left subcostal ribs without radiation, occurring intermittently with episodes lasting seconds, and described as sharp. Denies Fever, Cough, N/V, SOB, Diaphoresis, or ABD Pain. Patient was sustained a Fall on January 13th and fell on his back. He was evaluated in the ED at the time and imaging studies report no fractures. Patient presents to the ED due to concerns that his CP may be cardiac related.    In the ED, CT Chest/ABD/Pelvis reveals LLL PNA, subacute comminuted fracture of the LEFT scapula and subacute fractures of the LEFT 2nd-10th ribs. Patient was treated with Azithromycin and Ceftriaxone in ED. (25 Feb 2018 21:10)  2/ 27- no SOB or chest pain    PAST MEDICAL & SURGICAL HISTORY:  Diabetes mellitus  Hyperlipidemia  Hypertension  Coronary artery disease  Pacemaker  Afib  Stented coronary artery        MEDICATIONS  (STANDING):  azithromycin   Tablet 250 milliGRAM(s) Oral daily  cefTRIAXone Injectable. 1000 milliGRAM(s) IV Push <User Schedule>  dextrose 5%. 1000 milliLiter(s) (50 mL/Hr) IV Continuous <Continuous>  dextrose 50% Injectable 12.5 Gram(s) IV Push once  dextrose 50% Injectable 25 Gram(s) IV Push once  dextrose 50% Injectable 25 Gram(s) IV Push once  digoxin     Tablet 0.125 milliGRAM(s) Oral daily  diltiazem    milliGRAM(s) Oral daily  insulin lispro (HumaLOG) corrective regimen sliding scale   SubCutaneous three times a day before meals  insulin lispro (HumaLOG) corrective regimen sliding scale   SubCutaneous at bedtime  levothyroxine 75 MICROGram(s) Oral daily  lidocaine   Patch 1 Patch Transdermal daily  metoprolol succinate ER 50 milliGRAM(s) Oral daily  simvastatin 40 milliGRAM(s) Oral at bedtime    MEDICATIONS  (PRN):  acetaminophen   Tablet 650 milliGRAM(s) Oral every 6 hours PRN For Temp greater than 38 C (100.4 F)  acetaminophen   Tablet. 650 milliGRAM(s) Oral every 6 hours PRN Mild Pain (1 - 3)  ALBUTerol    0.083% 2.5 milliGRAM(s) Nebulizer every 3 hours PRN Shortness of Breath and/or Wheezing  dextrose Gel 1 Dose(s) Oral once PRN Blood Glucose LESS THAN 70 milliGRAM(s)/deciliter  glucagon  Injectable 1 milliGRAM(s) IntraMuscular once PRN Glucose LESS THAN 70 milligrams/deciliter  oxyCODONE    5 mG/acetaminophen 325 mG 1 Tablet(s) Oral every 6 hours PRN Severe Pain (7 - 10)          Vital Signs Last 24 Hrs  T(C): 36.4 (27 Feb 2018 05:00), Max: 36.6 (26 Feb 2018 17:25)  T(F): 97.6 (27 Feb 2018 05:00), Max: 97.8 (26 Feb 2018 17:25)  HR: 82 (27 Feb 2018 05:00) (59 - 82)  BP: 141/72 (27 Feb 2018 05:00) (130/70 - 147/67)  BP(mean): --  RR: 18 (27 Feb 2018 05:00) (17 - 18)  SpO2: 98% (27 Feb 2018 05:00) (98% - 100%)    I&O's Summary    26 Feb 2018 07:01  -  27 Feb 2018 07:00  --------------------------------------------------------  IN: 150 mL / OUT: 650 mL / NET: -500 mL    PHYSICAL EXAM  General Appearance: cooperative, no acute distress,   HEENT: PERRL, conjunctiva clear, EOM's intact, non injected pharynx, no exudate, TM   normal  Neck: Supple, , no adenopathy, thyroid: not enlarged, no carotid bruit or JVD  Back: Symmetric, no  tenderness,no soft tissue tenderness  Lungs: decreased breath sound left base 1/2 up  with left midlung rhonchi    Heart: Regular rate and rhythm, S1, S2 2/6 syst murmur LLSB  Abdomen: Soft, non-tender, bowel sounds active , no hepatosplenomegaly  Extremities:2+ edema bilat  No DP/PT bilat  Skin: Skin color, texture normal, no rashes   Neurologic: Alert and oriented X3 , cranial nerves intact, sensory and motor normal,  INTERPRETATION OF TELEMETRY:    ECG:        LABS:                          10.9   x     )-----------( x        ( 26 Feb 2018 17:42 )             33.4     02-26    137  |  104  |  23  ----------------------------<  75  3.9   |  28  |  1.07    Ca    8.5      26 Feb 2018 08:09  Mg     2.2     02-25    TPro  7.1  /  Alb  3.4  /  TBili  0.7  /  DBili  x   /  AST  37  /  ALT  27  /  AlkPhos  122<H>  02-26    CARDIAC MARKERS ( 25 Feb 2018 17:32 )  <0.015 ng/mL / x     / x     / x     / x      CARDIAC MARKERS ( 25 Feb 2018 15:36 )  <0.015 ng/mL / x     / x     / x     / x          Lipid Panel  108  52  37  97      PT/INR - ( 26 Feb 2018 08:09 )   PT: 68.3 sec;   INR: 6.10 ratio               IMPRESSION: 1.  Multiple left rib fractures and scapular fracture. with LLL pneumonia  2. CAD, s/p CABG, stents- stable  3. Atrial fibrillation-now with overantioagulation with  warfarin  4. Diabtetes mellitus  5. Hypothyroidism    Plan- agree with antibiotics, hold warfarin and continue digoxin, metoprolol, diltiazem, statin and levothyroxine

## 2018-02-27 NOTE — PROGRESS NOTE ADULT - ASSESSMENT
89 year old male with past medical history of Atrial Fibrillation, CAD s/p CABG (1988) s/p Stent, DM HTN, HLD, presenting with complaints of CP and found to have LLL Pneumonia, Fracture of the Scapula and 2nd-10th Ribs.     # CAP  - CT Chest: Left Lower Lobe Infiltrate  - monitor Pulse ox, supplement O2 PRN    - Incentive spirometry   - Continue with Azithromycin and Ceftriaxone  - Follow-up Blood Cultures   - Sputum Cx pending     #  Subacute Fracture of the Left Scapula, 2nd-10th Ribs.  - Orthopedic Consult appreciated   - C/w  Pain meds, add Lidoderm patch   - PT     #CAD s/p CABG s/p Stent  - CP secondary to rib fracture  - Trop negative x 2  - Continue BB, Statin    #Atrial Fibrillation. Coagulopathy   - [id number] = 4  - Continue Cardizem and Metoprolol   - INR supratheraputic, start trending down, no signs of acute bleeding  - Monitor H/H closely  - Hold coumadin   - Cardio f/u  appreciated         #HTN  - BP Stable  - Continue Toprol and Cardizem  - Hold Lasix    #HLD  - Lipid panel for AM  - Continue Simvastatin    #Hypothyroidism  - Continue Levothyroxine    #DM  - Hold oral hypoglycemics  - HbA1c noted   - ISS    DVT Prophylaxis: Supratherapeutic INR      Dispo: D/c planning  home with home PT when INR appropriate

## 2018-02-28 ENCOUNTER — TRANSCRIPTION ENCOUNTER (OUTPATIENT)
Age: 83
End: 2018-02-28

## 2018-02-28 VITALS — WEIGHT: 160.06 LBS

## 2018-02-28 LAB
GLUCOSE BLDC GLUCOMTR-MCNC: 149 MG/DL — HIGH (ref 70–99)
GLUCOSE BLDC GLUCOMTR-MCNC: 86 MG/DL — SIGNIFICANT CHANGE UP (ref 70–99)
HCT VFR BLD CALC: 33.5 % — LOW (ref 39–50)
HGB BLD-MCNC: 10.8 G/DL — LOW (ref 13–17)
INR BLD: 4.6 RATIO — HIGH (ref 0.88–1.16)
PROTHROM AB SERPL-ACNC: 51.3 SEC — HIGH (ref 9.8–12.7)

## 2018-02-28 RX ORDER — ACETAMINOPHEN 500 MG
2 TABLET ORAL
Qty: 0 | Refills: 0 | COMMUNITY
Start: 2018-02-28

## 2018-02-28 RX ORDER — LIDOCAINE 4 G/100G
1 CREAM TOPICAL
Qty: 1 | Refills: 0 | OUTPATIENT
Start: 2018-02-28 | End: 2018-03-04

## 2018-02-28 RX ORDER — WARFARIN SODIUM 2.5 MG/1
1 TABLET ORAL
Qty: 0 | Refills: 0 | COMMUNITY

## 2018-02-28 RX ORDER — CEFUROXIME AXETIL 250 MG
1 TABLET ORAL
Qty: 10 | Refills: 0 | OUTPATIENT
Start: 2018-02-28 | End: 2018-03-04

## 2018-02-28 RX ORDER — OXYCODONE HYDROCHLORIDE 5 MG/1
1 TABLET ORAL
Qty: 10 | Refills: 0 | OUTPATIENT
Start: 2018-02-28 | End: 2018-03-04

## 2018-02-28 RX ORDER — AZITHROMYCIN 500 MG/1
1 TABLET, FILM COATED ORAL
Qty: 1 | Refills: 0 | OUTPATIENT
Start: 2018-02-28 | End: 2018-02-28

## 2018-02-28 RX ADMIN — LIDOCAINE 1 PATCH: 4 CREAM TOPICAL at 00:30

## 2018-02-28 RX ADMIN — Medication 0.12 MILLIGRAM(S): at 05:29

## 2018-02-28 RX ADMIN — Medication 50 MILLIGRAM(S): at 05:29

## 2018-02-28 RX ADMIN — Medication 120 MILLIGRAM(S): at 05:29

## 2018-02-28 RX ADMIN — Medication 75 MICROGRAM(S): at 05:29

## 2018-02-28 RX ADMIN — AZITHROMYCIN 250 MILLIGRAM(S): 500 TABLET, FILM COATED ORAL at 11:42

## 2018-02-28 NOTE — DISCHARGE NOTE ADULT - HOSPITAL COURSE
89 year old male with past medical history of Atrial Fibrillation, CAD s/p CABG (1988) s/p Stent, DM,  HTN, HLD, admitted for atypical   CP.  Chest Pain located L lower,  ribs without radiation, sharp, occurring intermittently, with episodes lasting seconds, Worse with movement and deep breath.  Patient  has sustained a Fall on January 13th and fell on his back. He was evaluated in the ED at the time and imaging studies report no fractures.   Pt was further  seen by PCP  and was given pain meds for pain with improvement until this episode. + SOB. In the ED, CT Chest/ABD/Pelvis reveals LLL PNA, subacute comminuted fracture of the LEFT scapula and subacute fractures of the LEFT 2nd-10th ribs. Patient was started on  Azithromycin and Ceftriaxone  and pain management. Also was noted that INR was elevated, coumadin was held. Pts pain improved, no SOB, was able to bring up some phlegm with incentive spirometry. He remained Afebrile and no Leukocytosis. Also his H/H was  monitored and remained stable. Pt was evaluated by cardiology and   agree with holding coumadin for now, will follow up INR outPt. Pt was evaluated by PT and d/c home was recommended. Pt was seen and examined today, wife at bedside. Pt reports that feels well and wants to be discharged home. denies pain. No SOB, No cough. Good oral intake.  Results of blood work discussed. Pt will f/u with cardio office and schedule home INR blood check for Friday. Will hold coumadin for now. Fall precautions discussed.   Vital Signs Last 24 Hrs  T(C): 36.5 (28 Feb 2018 05:20), Max: 36.5 (27 Feb 2018 16:37)  T(F): 97.7 (28 Feb 2018 05:20), Max: 97.7 (27 Feb 2018 16:37)  HR: 56 (28 Feb 2018 05:20) (56 - 70)  BP: 144/72 (28 Feb 2018 05:20) (116/69 - 144/72)  RR: 18 (28 Feb 2018 05:20) (18 - 18)  SpO2: 98% (28 Feb 2018 05:20) (96% - 98%)      PHYSICAL EXAM:  General: Well developed; well nourished; in no acute distress  Eyes: PERRLA, EOMI; conjunctiva and sclera clear  Head: Normocephalic; atraumatic  ENMT: No nasal discharge; airway clear  Neck: Supple; non tender; no masses  Chest: + L lower  rib cage tenderness to palpation improved,  + L scapular ecchymosis, no tenderness   Respiratory: improved Air entry  b/l.  No wheezes, rales or rhonchi  Cardiovascular: Regular rate and rhythm. S1 and S2 Normal; No murmurs  Gastrointestinal: Soft non-tender non-distended; Normal bowel sounds  Genitourinary: No costovertebral angle tenderness, no suprapubic fullness   Extremities: Preserved  range of motion, No  edema  Vascular: Peripheral pulses palpable 2+ bilaterally  Neurological: Alert and oriented x 3, non focal   Skin: Warm and dry. No acute rash  Lymph Nodes: No acute cervical adenopathy  Musculoskeletal: Normal muscle  tone, without deformities  Psychiatric: Cooperative and appropriate      # CAP  - CT Chest: Left Lower Lobe Infiltrate  - stable pulse ox    - Incentive spirometry   - On  Azithromycin, will need one more day,    - On IV  Ceftriaxone, will switch ro PO Ceftin 250mg PO BID x 5 more days   - Blood Cultures: NGTD   - F/u with PCP     #  Subacute Fracture of the Left Scapula, 2nd-10th Ribs.  - Orthopedic Consult appreciated   - C/w  Pain meds: Tylenol,  Lidoderm patch, Oxy only for severe pain   - F/u with Ortho in 5 days     #CAD s/p CABG s/p Stent  - CP secondary to rib fracture  - Trop negative x 2  - Continue BB, Statin    #Atrial Fibrillation. Coagulopathy   - [id number] = 4  - Continue Cardizem and Metoprolol   - INR supratheraputic, start trending down, no signs of acute bleeding  - H/H stable  - Hold coumadin   - D/w Dr Magdaleno, will check INR on Friday, will see in the office next week          #HTN  - BP Stable  - Continue Toprol and Cardizem      # Chronic Diastolic CHF   - stable  - lasix on hold, may resume now     #HLD  - Continue Simvastatin    #Hypothyroidism  - Continue Levothyroxine    #DM  -resume oral hypoglycemics  - HbA1c noted   - ISS    Dispo: Stable for d/c home today, Pt and spouse refuse HC services.    D/w Dr Magdaleno

## 2018-02-28 NOTE — PROGRESS NOTE ADULT - SUBJECTIVE AND OBJECTIVE BOX
Patient is a 89y old  Male who presents with a chief complaint of CC: Chest Pain x 1 day (25 Feb 2018 21:10)      HPI:  89 year old male with past medical history of Atrial Fibrillation, CAD s/p CABG (1988) s/p Stent, DM HTN, HLD, presenting with complaints of CP x 1 day. Chest Pain started last night while patient was in bed. Chest Pain is located beneath the left subcostal ribs without radiation, occurring intermittently with episodes lasting seconds, and described as sharp. Denies Fever, Cough, N/V, SOB, Diaphoresis, or ABD Pain. Patient was sustained a Fall on January 13th and fell on his back. He was evaluated in the ED at the time and imaging studies report no fractures. Patient presents to the ED due to concerns that his CP may be cardiac related.    In the ED, CT Chest/ABD/Pelvis reveals LLL PNA, subacute comminuted fracture of the LEFT scapula and subacute fractures of the LEFT 2nd-10th ribs. Patient was treated with Azithromycin and Ceftriaxone in ED. (25 Feb 2018 21:10)  2/28 no complaints    PAST MEDICAL & SURGICAL HISTORY:  Diabetes mellitus  Hyperlipidemia  Hypertension  Coronary artery disease  Pacemaker  Afib  Stented coronary artery        MEDICATIONS  (STANDING):  azithromycin   Tablet 250 milliGRAM(s) Oral daily  cefTRIAXone Injectable. 1000 milliGRAM(s) IV Push <User Schedule>  dextrose 5%. 1000 milliLiter(s) (50 mL/Hr) IV Continuous <Continuous>  dextrose 50% Injectable 12.5 Gram(s) IV Push once  dextrose 50% Injectable 25 Gram(s) IV Push once  dextrose 50% Injectable 25 Gram(s) IV Push once  digoxin     Tablet 0.125 milliGRAM(s) Oral daily  diltiazem    milliGRAM(s) Oral daily  insulin lispro (HumaLOG) corrective regimen sliding scale   SubCutaneous three times a day before meals  insulin lispro (HumaLOG) corrective regimen sliding scale   SubCutaneous at bedtime  levothyroxine 75 MICROGram(s) Oral daily  lidocaine   Patch 1 Patch Transdermal daily  metoprolol succinate ER 50 milliGRAM(s) Oral daily  simvastatin 40 milliGRAM(s) Oral at bedtime    MEDICATIONS  (PRN):  acetaminophen   Tablet 650 milliGRAM(s) Oral every 6 hours PRN For Temp greater than 38 C (100.4 F)  acetaminophen   Tablet. 650 milliGRAM(s) Oral every 6 hours PRN Mild Pain (1 - 3)  ALBUTerol    0.083% 2.5 milliGRAM(s) Nebulizer every 3 hours PRN Shortness of Breath and/or Wheezing  dextrose Gel 1 Dose(s) Oral once PRN Blood Glucose LESS THAN 70 milliGRAM(s)/deciliter  glucagon  Injectable 1 milliGRAM(s) IntraMuscular once PRN Glucose LESS THAN 70 milligrams/deciliter  oxyCODONE    5 mG/acetaminophen 325 mG 1 Tablet(s) Oral every 6 hours PRN Severe Pain (7 - 10)          Vital Signs Last 24 Hrs  T(C): 36.5 (28 Feb 2018 05:20), Max: 36.5 (27 Feb 2018 16:37)  T(F): 97.7 (28 Feb 2018 05:20), Max: 97.7 (27 Feb 2018 16:37)  HR: 56 (28 Feb 2018 05:20) (56 - 70)  BP: 144/72 (28 Feb 2018 05:20) (110/53 - 144/72)  BP(mean): --  RR: 18 (28 Feb 2018 05:20) (18 - 18)  SpO2: 98% (28 Feb 2018 05:20) (96% - 98%)    I&O's Summary    27 Feb 2018 07:01  -  28 Feb 2018 07:00  --------------------------------------------------------  IN: 100 mL / OUT: 0 mL / NET: 100 mL        PHYSICAL EXAM  General Appearance:NAD  HEENT:   Neck: no jvd  Back:   Lungs: few ronchi  Heart:i RR s1s2  Abdomen:   Extremities: no edema  Skin:   Neurologic: alert      INTERPRETATION OF TELEMETRY:    ECG:        LABS:                          10.8   x     )-----------( x        ( 28 Feb 2018 07:32 )             33.5     02-27    138  |  107  |  19  ----------------------------<  83  4.0   |  25  |  0.90    Ca    8.4<L>      27 Feb 2018 07:46          Lipid Panel  108  52  37  97      PT/INR - ( 28 Feb 2018 07:32 )   PT: 51.3 sec;   INR: 4.60 ratio                   RADIOLOGY & ADDITIONAL STUDIES:

## 2018-02-28 NOTE — DISCHARGE NOTE ADULT - PLAN OF CARE
resolve complete oral abxs heal control pain with tylenol and patch   Use oxycodone cautiously  F/u with Dr Manzo in 5 days monitor INR hold coumadin until recommended otherwise by Dr Magdaleno   F/u with Dr Magdaleno for INR check on Friday ( office will set up home blood draw)  See Dr Magdaleno in the office next week

## 2018-02-28 NOTE — DISCHARGE NOTE ADULT - MEDICATION SUMMARY - MEDICATIONS TO STOP TAKING
I will STOP taking the medications listed below when I get home from the hospital:    warfarin 2.5 mg oral tablet  -- 1 tab(s) by mouth once a day    predniSONE 5 mg oral tablet  -- 3 tab(s) by mouth once a day for 3 days, then  2 tabs by mouth once a day for 3  days, then  1  tab by mouth once a day for 3    albuterol 90 mcg/inh inhalation aerosol  -- 1 puff(s) inhaled every 4 hours    tiotropium 18 mcg inhalation capsule  -- 1 cap(s) inhaled once a day

## 2018-02-28 NOTE — DISCHARGE NOTE ADULT - CARE PLAN
Principal Discharge DX:	CAP (community acquired pneumonia)  Goal:	resolve  Assessment and plan of treatment:	complete oral abxs  Secondary Diagnosis:	Rib fractures  Goal:	heal  Assessment and plan of treatment:	control pain with tylenol and patch   Use oxycodone cautiously  F/u with Dr Manzo in 5 days  Secondary Diagnosis:	Afib  Goal:	monitor INR  Assessment and plan of treatment:	hold coumadin until recommended otherwise by Dr Magdaleno   F/u with Dr Magdaleno for INR check on Friday ( office will set up home blood draw)  See Dr Magdaleno in the office next week

## 2018-02-28 NOTE — DISCHARGE NOTE ADULT - MEDICATION SUMMARY - MEDICATIONS TO TAKE
I will START or STAY ON the medications listed below when I get home from the hospital:    acetaminophen 325 mg oral tablet  -- 2 tab(s) by mouth every 8 hours, As Needed  for mild pain   -- Indication: For pain    oxyCODONE 5 mg oral tablet  -- 1 tab(s) by mouth 2 times a day  for severe pain MDD:10mg  -- Caution federal law prohibits the transfer of this drug to any person other  than the person for whom it was prescribed.  It is very important that you take or use this exactly as directed.  Do not skip doses or discontinue unless directed by your doctor.  May cause drowsiness.  Alcohol may intensify this effect.  Use care when operating dangerous machinery.  This prescription cannot be refilled.  Using more of this medication than prescribed may cause serious breathing problems.    -- Indication: For pain    Cartia  mg/24 hours oral capsule, extended release  -- 1 cap(s) by mouth once a day  -- Indication: For AFIB    digoxin 125 mcg (0.125 mg) oral tablet  -- 1 tab(s) by mouth once a day  -- Indication: For AFIB    glipiZIDE 2.5 mg oral tablet, extended release  -- 1 tab(s) by mouth once a day  -- Indication: For DM    simvastatin 40 mg oral tablet  -- 1 tab(s) by mouth once a day (at bedtime)  -- Indication: For HLD    metoprolol succinate 50 mg oral tablet, extended release  -- 1 tab(s) by mouth once a day  -- Indication: For AFIB    Ceftin 250 mg oral tablet  -- 1 tab(s) by mouth 2 times a day   -- Finish all this medication unless otherwise directed by prescriber.  Medication should be taken with plenty of water.  Take with food or milk.    -- Indication: For PNA    lidocaine 5% topical film  -- Apply on skin to affected area once a day   -- Indication: For PAIN    Lasix 40 mg oral tablet  -- 1 tab(s) by mouth once a day  -- Indication: For CHF    azithromycin 250 mg oral tablet  -- 1 tab(s) by mouth once a day  -- Indication: For PNA    potassium chloride 20 mEq oral granule, extended release  --  by mouth   -- Indication: For Supplement    levothyroxine 75 mcg (0.075 mg) oral capsule  -- 1 cap(s) by mouth once a day  -- Indication: For Hypothyroidism    Calcium 600+D Plus Minerals oral tablet, chewable  -- 1 tab(s) by mouth once a day  -- Indication: For Supplement

## 2018-02-28 NOTE — PROGRESS NOTE ADULT - ASSESSMENT
IMPRESSION: 1.  Multiple left rib fractures and scapular fracture. with LLL pneumonia improving  2. CAD, s/p CABG, stents- stable  3. Atrial fibrillation-now with overantioagulation with  warfarin  4. Diabtetes mellitus  5. Hypothyroidism    Plan- , hold warfarin and continue digoxin, metoprolol, diltiazem  inc activity

## 2018-02-28 NOTE — DISCHARGE NOTE ADULT - PATIENT PORTAL LINK FT
You can access the [facility] Patient Portal, offered by [facility], by registering with the following website: [email or web address]

## 2018-02-28 NOTE — DISCHARGE NOTE ADULT - CARE PROVIDER_API CALL
Elan Magdaleno), Cardiovascular Disease; Critical Care Medicine; Internal Medicine  [address] [place] [address]  Phone: [phone]  Fax: [phone]    Bettye Manzo), Orthopedics  [address] [place] [address]  Phone: [phone]  Fax: [phone]

## 2018-03-03 LAB
CULTURE RESULTS: SIGNIFICANT CHANGE UP
CULTURE RESULTS: SIGNIFICANT CHANGE UP
SPECIMEN SOURCE: SIGNIFICANT CHANGE UP
SPECIMEN SOURCE: SIGNIFICANT CHANGE UP
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
## 2018-03-05 DIAGNOSIS — I11.0 HYPERTENSIVE HEART DISEASE WITH HEART FAILURE: ICD-10-CM
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
## 2018-03-05 DIAGNOSIS — Z95.1 PRESENCE OF AORTOCORONARY BYPASS GRAFT: ICD-10-CM

## 2018-03-05 DIAGNOSIS — R07.9 CHEST PAIN, UNSPECIFIED: ICD-10-CM

## 2018-03-05 DIAGNOSIS — S22.42XA MULTIPLE FRACTURES OF RIBS, LEFT SIDE, INITIAL ENCOUNTER FOR CLOSED FRACTURE: ICD-10-CM

## 2018-03-05 DIAGNOSIS — E11.9 TYPE 2 DIABETES MELLITUS WITHOUT COMPLICATIONS: ICD-10-CM

## 2018-03-05 DIAGNOSIS — R79.1 ABNORMAL COAGULATION PROFILE: ICD-10-CM

## 2018-03-05 DIAGNOSIS — S42.102A FRACTURE OF UNSPECIFIED PART OF SCAPULA, LEFT SHOULDER, INITIAL ENCOUNTER FOR CLOSED FRACTURE: ICD-10-CM

## 2018-03-05 DIAGNOSIS — E03.9 HYPOTHYROIDISM, UNSPECIFIED: ICD-10-CM

## 2018-03-05 DIAGNOSIS — J18.9 PNEUMONIA, UNSPECIFIED ORGANISM: ICD-10-CM

## 2018-03-05 DIAGNOSIS — Z95.0 PRESENCE OF CARDIAC PACEMAKER: ICD-10-CM

## 2018-03-05 DIAGNOSIS — I25.10 ATHEROSCLEROTIC HEART DISEASE OF NATIVE CORONARY ARTERY WITHOUT ANGINA PECTORIS: ICD-10-CM

## 2018-03-05 DIAGNOSIS — Z95.5 PRESENCE OF CORONARY ANGIOPLASTY IMPLANT AND GRAFT: ICD-10-CM

## 2018-03-05 DIAGNOSIS — I48.91 UNSPECIFIED ATRIAL FIBRILLATION: ICD-10-CM

## 2018-03-05 DIAGNOSIS — E78.5 HYPERLIPIDEMIA, UNSPECIFIED: ICD-10-CM

## 2018-03-05 DIAGNOSIS — I50.32 CHRONIC DIASTOLIC (CONGESTIVE) HEART FAILURE: ICD-10-CM

## 2018-03-12 ENCOUNTER — APPOINTMENT (OUTPATIENT)
Dept: ORTHOPEDIC SURGERY | Facility: CLINIC | Age: 83
End: 2018-03-12
Payer: MEDICARE

## 2018-03-12 VITALS — HEIGHT: 69 IN | BODY MASS INDEX: 23.7 KG/M2 | WEIGHT: 160 LBS

## 2018-03-12 VITALS
BODY MASS INDEX: 23.7 KG/M2 | HEART RATE: 85 BPM | SYSTOLIC BLOOD PRESSURE: 139 MMHG | TEMPERATURE: 97.2 F | WEIGHT: 160 LBS | DIASTOLIC BLOOD PRESSURE: 75 MMHG | HEIGHT: 69 IN

## 2018-03-12 DIAGNOSIS — S42.112D DISPLACED FRACTURE OF BODY OF SCAPULA, LEFT SHOULDER, SUBSEQUENT ENCOUNTER FOR FRACTURE WITH ROUTINE HEALING: ICD-10-CM

## 2018-03-12 DIAGNOSIS — Z86.39 PERSONAL HISTORY OF OTHER ENDOCRINE, NUTRITIONAL AND METABOLIC DISEASE: ICD-10-CM

## 2018-03-12 DIAGNOSIS — Z78.9 OTHER SPECIFIED HEALTH STATUS: ICD-10-CM

## 2018-03-12 DIAGNOSIS — Z86.79 PERSONAL HISTORY OF OTHER DISEASES OF THE CIRCULATORY SYSTEM: ICD-10-CM

## 2018-03-12 DIAGNOSIS — Z87.01 PERSONAL HISTORY OF PNEUMONIA (RECURRENT): ICD-10-CM

## 2018-03-12 DIAGNOSIS — Z87.891 PERSONAL HISTORY OF NICOTINE DEPENDENCE: ICD-10-CM

## 2018-03-12 PROCEDURE — 23570 CLTX SCAPULAR FX W/O MNPJ: CPT | Mod: LT

## 2018-03-12 PROCEDURE — 99204 OFFICE O/P NEW MOD 45 MIN: CPT | Mod: 57

## 2018-03-26 ENCOUNTER — APPOINTMENT (OUTPATIENT)
Dept: ELECTROPHYSIOLOGY | Facility: CLINIC | Age: 83
End: 2018-03-26
Payer: MEDICARE

## 2018-03-26 VITALS
WEIGHT: 157 LBS | BODY MASS INDEX: 23.25 KG/M2 | HEART RATE: 72 BPM | SYSTOLIC BLOOD PRESSURE: 102 MMHG | DIASTOLIC BLOOD PRESSURE: 64 MMHG | HEIGHT: 69 IN

## 2018-03-26 PROCEDURE — 93279 PRGRMG DEV EVAL PM/LDLS PM: CPT

## 2018-04-07 ENCOUNTER — INPATIENT (INPATIENT)
Facility: HOSPITAL | Age: 83
LOS: 1 days | Discharge: HOME | End: 2018-04-09
Attending: INTERNAL MEDICINE | Admitting: INTERNAL MEDICINE
Payer: MEDICARE

## 2018-04-07 VITALS — WEIGHT: 149.91 LBS | HEIGHT: 69 IN

## 2018-04-07 DIAGNOSIS — Z95.5 PRESENCE OF CORONARY ANGIOPLASTY IMPLANT AND GRAFT: Chronic | ICD-10-CM

## 2018-04-07 LAB
ALBUMIN SERPL ELPH-MCNC: 4 G/DL — SIGNIFICANT CHANGE UP (ref 3.3–5)
ALP SERPL-CCNC: 96 U/L — SIGNIFICANT CHANGE UP (ref 40–120)
ALT FLD-CCNC: 36 U/L — SIGNIFICANT CHANGE UP (ref 12–78)
ANION GAP SERPL CALC-SCNC: 7 MMOL/L — SIGNIFICANT CHANGE UP (ref 5–17)
ANISOCYTOSIS BLD QL: SLIGHT — SIGNIFICANT CHANGE UP
APPEARANCE UR: CLEAR — SIGNIFICANT CHANGE UP
APTT BLD: 53.3 SEC — HIGH (ref 27.5–37.4)
AST SERPL-CCNC: 40 U/L — HIGH (ref 15–37)
BASOPHILS # BLD AUTO: 0.03 K/UL — SIGNIFICANT CHANGE UP (ref 0–0.2)
BASOPHILS NFR BLD AUTO: 0.5 % — SIGNIFICANT CHANGE UP (ref 0–2)
BILIRUB SERPL-MCNC: 0.8 MG/DL — SIGNIFICANT CHANGE UP (ref 0.2–1.2)
BILIRUB UR-MCNC: NEGATIVE — SIGNIFICANT CHANGE UP
BUN SERPL-MCNC: 31 MG/DL — HIGH (ref 7–23)
CALCIUM SERPL-MCNC: 9.1 MG/DL — SIGNIFICANT CHANGE UP (ref 8.5–10.1)
CHLORIDE SERPL-SCNC: 103 MMOL/L — SIGNIFICANT CHANGE UP (ref 96–108)
CK SERPL-CCNC: 75 U/L — SIGNIFICANT CHANGE UP (ref 26–308)
CO2 SERPL-SCNC: 29 MMOL/L — SIGNIFICANT CHANGE UP (ref 22–31)
COLOR SPEC: YELLOW — SIGNIFICANT CHANGE UP
CREAT SERPL-MCNC: 1.16 MG/DL — SIGNIFICANT CHANGE UP (ref 0.5–1.3)
DIFF PNL FLD: NEGATIVE — SIGNIFICANT CHANGE UP
DIGOXIN SERPL-MCNC: 1.32 NG/ML — SIGNIFICANT CHANGE UP (ref 0.8–2)
EOSINOPHIL # BLD AUTO: 0.19 K/UL — SIGNIFICANT CHANGE UP (ref 0–0.5)
EOSINOPHIL NFR BLD AUTO: 3.3 % — SIGNIFICANT CHANGE UP (ref 0–6)
GLUCOSE BLDC GLUCOMTR-MCNC: 100 MG/DL — HIGH (ref 70–99)
GLUCOSE SERPL-MCNC: 190 MG/DL — HIGH (ref 70–99)
GLUCOSE UR QL: NEGATIVE MG/DL — SIGNIFICANT CHANGE UP
HCT VFR BLD CALC: 36.1 % — LOW (ref 39–50)
HGB BLD-MCNC: 12.1 G/DL — LOW (ref 13–17)
IMM GRANULOCYTES NFR BLD AUTO: 0.4 % — SIGNIFICANT CHANGE UP (ref 0–1.5)
INR BLD: 3.19 RATIO — HIGH (ref 0.88–1.16)
KETONES UR-MCNC: NEGATIVE — SIGNIFICANT CHANGE UP
LEUKOCYTE ESTERASE UR-ACNC: NEGATIVE — SIGNIFICANT CHANGE UP
LG PLATELETS BLD QL AUTO: SLIGHT — SIGNIFICANT CHANGE UP
LYMPHOCYTES # BLD AUTO: 1.99 K/UL — SIGNIFICANT CHANGE UP (ref 1–3.3)
LYMPHOCYTES # BLD AUTO: 34.9 % — SIGNIFICANT CHANGE UP (ref 13–44)
MACROCYTES BLD QL: SLIGHT — SIGNIFICANT CHANGE UP
MANUAL SMEAR VERIFICATION: SIGNIFICANT CHANGE UP
MCHC RBC-ENTMCNC: 33.5 GM/DL — SIGNIFICANT CHANGE UP (ref 32–36)
MCHC RBC-ENTMCNC: 34.8 PG — HIGH (ref 27–34)
MCV RBC AUTO: 103.7 FL — HIGH (ref 80–100)
MONOCYTES # BLD AUTO: 0.7 K/UL — SIGNIFICANT CHANGE UP (ref 0–0.9)
MONOCYTES NFR BLD AUTO: 12.3 % — SIGNIFICANT CHANGE UP (ref 2–14)
NEUTROPHILS # BLD AUTO: 2.77 K/UL — SIGNIFICANT CHANGE UP (ref 1.8–7.4)
NEUTROPHILS NFR BLD AUTO: 48.6 % — SIGNIFICANT CHANGE UP (ref 43–77)
NITRITE UR-MCNC: NEGATIVE — SIGNIFICANT CHANGE UP
NRBC # BLD: 0 /100 WBCS — SIGNIFICANT CHANGE UP (ref 0–0)
PH UR: 7 — SIGNIFICANT CHANGE UP (ref 5–8)
PLAT MORPH BLD: (no result)
PLATELET # BLD AUTO: 138 K/UL — LOW (ref 150–400)
PLATELET COUNT - ESTIMATE: NORMAL — SIGNIFICANT CHANGE UP
POIKILOCYTOSIS BLD QL AUTO: SLIGHT — SIGNIFICANT CHANGE UP
POLYCHROMASIA BLD QL SMEAR: SLIGHT — SIGNIFICANT CHANGE UP
POTASSIUM SERPL-MCNC: 4 MMOL/L — SIGNIFICANT CHANGE UP (ref 3.5–5.3)
POTASSIUM SERPL-SCNC: 4 MMOL/L — SIGNIFICANT CHANGE UP (ref 3.5–5.3)
PROT SERPL-MCNC: 7.7 GM/DL — SIGNIFICANT CHANGE UP (ref 6–8.3)
PROT UR-MCNC: NEGATIVE MG/DL — SIGNIFICANT CHANGE UP
PROTHROM AB SERPL-ACNC: 35.3 SEC — HIGH (ref 9.8–12.7)
RBC # BLD: 3.48 M/UL — LOW (ref 4.2–5.8)
RBC # FLD: 14.5 % — SIGNIFICANT CHANGE UP (ref 10.3–14.5)
RBC BLD AUTO: (no result)
SCHISTOCYTES BLD QL AUTO: SLIGHT — SIGNIFICANT CHANGE UP
SODIUM SERPL-SCNC: 139 MMOL/L — SIGNIFICANT CHANGE UP (ref 135–145)
SP GR SPEC: 1.01 — SIGNIFICANT CHANGE UP (ref 1.01–1.02)
TROPONIN I SERPL-MCNC: <0.015 NG/ML — SIGNIFICANT CHANGE UP (ref 0.01–0.04)
UROBILINOGEN FLD QL: NEGATIVE MG/DL — SIGNIFICANT CHANGE UP
WBC # BLD: 5.7 K/UL — SIGNIFICANT CHANGE UP (ref 3.8–10.5)
WBC # FLD AUTO: 5.7 K/UL — SIGNIFICANT CHANGE UP (ref 3.8–10.5)

## 2018-04-07 PROCEDURE — 71045 X-RAY EXAM CHEST 1 VIEW: CPT | Mod: 26

## 2018-04-07 PROCEDURE — 93010 ELECTROCARDIOGRAM REPORT: CPT

## 2018-04-07 PROCEDURE — 70450 CT HEAD/BRAIN W/O DYE: CPT | Mod: 26

## 2018-04-07 PROCEDURE — 99285 EMERGENCY DEPT VISIT HI MDM: CPT

## 2018-04-07 RX ORDER — WARFARIN SODIUM 2.5 MG/1
2.5 TABLET ORAL DAILY
Qty: 0 | Refills: 0 | Status: DISCONTINUED | OUTPATIENT
Start: 2018-04-07 | End: 2018-04-09

## 2018-04-07 RX ORDER — DEXTROSE 50 % IN WATER 50 %
25 SYRINGE (ML) INTRAVENOUS ONCE
Qty: 0 | Refills: 0 | Status: DISCONTINUED | OUTPATIENT
Start: 2018-04-07 | End: 2018-04-09

## 2018-04-07 RX ORDER — DILTIAZEM HCL 120 MG
120 CAPSULE, EXT RELEASE 24 HR ORAL DAILY
Qty: 0 | Refills: 0 | Status: DISCONTINUED | OUTPATIENT
Start: 2018-04-07 | End: 2018-04-09

## 2018-04-07 RX ORDER — DEXTROSE 50 % IN WATER 50 %
12.5 SYRINGE (ML) INTRAVENOUS ONCE
Qty: 0 | Refills: 0 | Status: DISCONTINUED | OUTPATIENT
Start: 2018-04-07 | End: 2018-04-09

## 2018-04-07 RX ORDER — METOPROLOL TARTRATE 50 MG
50 TABLET ORAL DAILY
Qty: 0 | Refills: 0 | Status: DISCONTINUED | OUTPATIENT
Start: 2018-04-07 | End: 2018-04-09

## 2018-04-07 RX ORDER — INSULIN LISPRO 100/ML
VIAL (ML) SUBCUTANEOUS
Qty: 0 | Refills: 0 | Status: DISCONTINUED | OUTPATIENT
Start: 2018-04-07 | End: 2018-04-09

## 2018-04-07 RX ORDER — FUROSEMIDE 40 MG
40 TABLET ORAL DAILY
Qty: 0 | Refills: 0 | Status: DISCONTINUED | OUTPATIENT
Start: 2018-04-07 | End: 2018-04-09

## 2018-04-07 RX ORDER — LEVOTHYROXINE SODIUM 125 MCG
75 TABLET ORAL DAILY
Qty: 0 | Refills: 0 | Status: DISCONTINUED | OUTPATIENT
Start: 2018-04-07 | End: 2018-04-09

## 2018-04-07 RX ORDER — MULTIVIT-MIN/FERROUS GLUCONATE 9 MG/15 ML
1 LIQUID (ML) ORAL
Qty: 0 | Refills: 0 | COMMUNITY

## 2018-04-07 RX ORDER — ACETAMINOPHEN 500 MG
650 TABLET ORAL EVERY 8 HOURS
Qty: 0 | Refills: 0 | Status: DISCONTINUED | OUTPATIENT
Start: 2018-04-07 | End: 2018-04-09

## 2018-04-07 RX ORDER — SIMVASTATIN 20 MG/1
40 TABLET, FILM COATED ORAL AT BEDTIME
Qty: 0 | Refills: 0 | Status: DISCONTINUED | OUTPATIENT
Start: 2018-04-07 | End: 2018-04-09

## 2018-04-07 RX ORDER — SODIUM CHLORIDE 9 MG/ML
1000 INJECTION, SOLUTION INTRAVENOUS
Qty: 0 | Refills: 0 | Status: DISCONTINUED | OUTPATIENT
Start: 2018-04-07 | End: 2018-04-09

## 2018-04-07 RX ORDER — GLUCAGON INJECTION, SOLUTION 0.5 MG/.1ML
1 INJECTION, SOLUTION SUBCUTANEOUS ONCE
Qty: 0 | Refills: 0 | Status: DISCONTINUED | OUTPATIENT
Start: 2018-04-07 | End: 2018-04-09

## 2018-04-07 RX ORDER — DIGOXIN 250 MCG
0.12 TABLET ORAL DAILY
Qty: 0 | Refills: 0 | Status: DISCONTINUED | OUTPATIENT
Start: 2018-04-07 | End: 2018-04-09

## 2018-04-07 RX ORDER — DEXTROSE 50 % IN WATER 50 %
1 SYRINGE (ML) INTRAVENOUS ONCE
Qty: 0 | Refills: 0 | Status: DISCONTINUED | OUTPATIENT
Start: 2018-04-07 | End: 2018-04-09

## 2018-04-07 NOTE — H&P ADULT - HISTORY OF PRESENT ILLNESS
89 y-o Male with PMHX of A-Fib, CAD, HTN, HLD, Pacemaker, DM presents to the ED c/o of Dizziness. Pt states he has been having intermittent dizziness x 1 week. Pt notes he got out of bed today when he felt a sudden onset of dizziness and started having difficulty ambulating, falling to the left. Pt reports when he walks his "body" makes him go left unwillingly. Pt notes he has had episodes like this in the past but normally Sx dissipate on their own, today Sx have not resolved. Denies HA, CP, SOB. Pt's Last fall was on January 13th, Ct head was negative. PMD/Cardioloigst Dr. Magdaleno. 89 y-o Male with PMHX of A-Fib, CAD, HTN, HLD, Pacemaker, DM presented to the ED for c/o of severe Dizziness. Pt states he has been having intermittent dizziness x 1 week. Pt notes he got out of bed today when he felt a sudden onset of dizziness and started having difficulty ambulating, falling to the left. Pt reports when he walks his "body" makes him go left unwillingly. Pt notes he has had episodes like this in the past but normally Sx dissipate on their own, today Sx have not resolved. Denies HA, CP, SOB. Pt's had a fall on January 13th, Ct head was negative.

## 2018-04-07 NOTE — ED PROVIDER NOTE - OBJECTIVE STATEMENT
89 y-o Male with PMHX of A-Fib, CAD, HTN, HLD, Pacemaker, DM presents to the ED c/o of Dizziness. Pt states he has been having intermittent dizziness x 1 week. Pt notes he got out of bed today when he felt a sudden onset of dizziness and started having difficulty ambulating, falling to the left. Pt reports when he walks his "body" makes him go left unwillingly. Pt notes he has had episodes like this in the past but normally Sx dissipate on their own, today Sx have not resolved. Denies HA, CP, SOB. Pt's Last fall was on January 13th, Ct head was negative. PMD/Cardioloigst Dr. Magdaleno.

## 2018-04-07 NOTE — ED ADULT NURSE NOTE - OBJECTIVE STATEMENT
Patient comes to ED for dizziness over the past 2 weeks. pt reports increase in dizziness this morning with left sided gait change. pt states when he stands up his body goes to the left. pt denies any fall, chest pain or SOB. Pt is AxOx4, VSS. pt neuro intact, follows commands. Some dysmetria noted.

## 2018-04-07 NOTE — ED PROVIDER NOTE - PROGRESS NOTE DETAILS
Craig Lizarraga: Pt unable to ambulate on their own during walking trial. Craig Lizarraga: Case discussed with hospitalist and Dr Palma of Neuro. Craig Lizarraga: Pt unable to ambulate on their own during walking trial- veering to left. Craig Lizarraga: Case discussed with hospitalist and Dr aPlma- Neuro- will see in hospital; patient to be placed on tele. On coumadin and anticoagulated at this time.

## 2018-04-07 NOTE — ED PROVIDER NOTE - CRANIAL NERVE AND PUPILLARY EXAM
tongue is midline/cranial nerves 2-12 intact/extra-ocular movements intact/central vision intact/peripheral vision intact ?dysmetria on examination;/tongue is midline/cranial nerves 2-12 intact/extra-ocular movements intact/central vision intact/peripheral vision intact

## 2018-04-07 NOTE — H&P ADULT - ASSESSMENT
89 yo male presented with severe dizziness.    A/P:    1.  Severe Dizziness  Unsteady gait  -r/o Cerebellar stroke  -will follow in Telemetry   -Neuro check for the next 24 hours  -follow neurology consult    2.  Chronic A fib  -HR controlled  -continue coumadin, follow INR    3.  CAD  HTN  Hyperlipidemia  -stable  -continue statin    4.  DM  -follow Dxt  -Keep on ISS

## 2018-04-07 NOTE — H&P ADULT - NSHPPHYSICALEXAM_GEN_ALL_CORE
Vital Signs Last 24 Hrs  T(C): 36.6 (07 Apr 2018 23:57), Max: 36.6 (07 Apr 2018 20:12)  T(F): 97.8 (07 Apr 2018 23:57), Max: 97.9 (07 Apr 2018 20:12)  HR: 68 (07 Apr 2018 23:57) (60 - 84)  BP: 131/67 (07 Apr 2018 23:57) (116/67 - 137/93)  BP(mean): 77 (07 Apr 2018 16:15) (77 - 77)  RR: 18 (07 Apr 2018 23:57) (16 - 20)  SpO2: 100% (07 Apr 2018 23:57) (100% - 100%)

## 2018-04-07 NOTE — H&P ADULT - NEUROLOGICAL DETAILS
alert and oriented x 3/responds to verbal commands/responds to pain/sensation intact/normal strength/deep reflexes intact/cranial nerves intact

## 2018-04-07 NOTE — ED ADULT NURSE REASSESSMENT NOTE - NS ED NURSE REASSESS COMMENT FT1
pt ambulated with assist, pt gait steady but periods of unsteady with patient leaning to left then loosing balance. pt denies any dizziness at this time.
pt resting comfortably in bed with no complaints. VSS. Pt awaiting admitting orders
Patient received from CLAYTON Francisco. Patient resting comfortably in bed. Safety and comfort maintained. Will continue to monitor.

## 2018-04-07 NOTE — ED PROVIDER NOTE - MEDICAL DECISION MAKING DETAILS
89 y-o Male presents with Dizziness, r/o cerebral infarct. Will Give Ct Head, CXR, EKG, Labs, UA and Consult Neuro. 89 y-o Male presents with Dizziness, r/o cerebral infarct. CT head, EKG/CXR, labs, neuro consult; re-eval

## 2018-04-07 NOTE — ED ADULT TRIAGE NOTE - CHIEF COMPLAINT QUOTE
woke up with dizziness persistent since this morning, intermittent.  feels like he will fall to the left, equilibrium off.  patient was able to use walker to get into car with assistance.  No problems with swallowing or talking

## 2018-04-08 DIAGNOSIS — I48.91 UNSPECIFIED ATRIAL FIBRILLATION: ICD-10-CM

## 2018-04-08 DIAGNOSIS — R42 DIZZINESS AND GIDDINESS: ICD-10-CM

## 2018-04-08 DIAGNOSIS — E11.9 TYPE 2 DIABETES MELLITUS WITHOUT COMPLICATIONS: ICD-10-CM

## 2018-04-08 DIAGNOSIS — Z95.0 PRESENCE OF CARDIAC PACEMAKER: ICD-10-CM

## 2018-04-08 DIAGNOSIS — Z95.0 PRESENCE OF CARDIAC PACEMAKER: Chronic | ICD-10-CM

## 2018-04-08 LAB
ANION GAP SERPL CALC-SCNC: 7 MMOL/L — SIGNIFICANT CHANGE UP (ref 5–17)
BASOPHILS # BLD AUTO: 0.02 K/UL — SIGNIFICANT CHANGE UP (ref 0–0.2)
BASOPHILS NFR BLD AUTO: 0.4 % — SIGNIFICANT CHANGE UP (ref 0–2)
BUN SERPL-MCNC: 30 MG/DL — HIGH (ref 7–23)
CALCIUM SERPL-MCNC: 8.5 MG/DL — SIGNIFICANT CHANGE UP (ref 8.5–10.1)
CHLORIDE SERPL-SCNC: 107 MMOL/L — SIGNIFICANT CHANGE UP (ref 96–108)
CO2 SERPL-SCNC: 27 MMOL/L — SIGNIFICANT CHANGE UP (ref 22–31)
CREAT SERPL-MCNC: 0.95 MG/DL — SIGNIFICANT CHANGE UP (ref 0.5–1.3)
CULTURE RESULTS: NO GROWTH — SIGNIFICANT CHANGE UP
EOSINOPHIL # BLD AUTO: 0.18 K/UL — SIGNIFICANT CHANGE UP (ref 0–0.5)
EOSINOPHIL NFR BLD AUTO: 3.9 % — SIGNIFICANT CHANGE UP (ref 0–6)
GLUCOSE BLDC GLUCOMTR-MCNC: 90 MG/DL — SIGNIFICANT CHANGE UP (ref 70–99)
GLUCOSE BLDC GLUCOMTR-MCNC: 94 MG/DL — SIGNIFICANT CHANGE UP (ref 70–99)
GLUCOSE BLDC GLUCOMTR-MCNC: 97 MG/DL — SIGNIFICANT CHANGE UP (ref 70–99)
GLUCOSE SERPL-MCNC: 79 MG/DL — SIGNIFICANT CHANGE UP (ref 70–99)
HCT VFR BLD CALC: 33.9 % — LOW (ref 39–50)
HGB BLD-MCNC: 11.4 G/DL — LOW (ref 13–17)
IMM GRANULOCYTES NFR BLD AUTO: 0.2 % — SIGNIFICANT CHANGE UP (ref 0–1.5)
INR BLD: 3.37 RATIO — HIGH (ref 0.88–1.16)
LYMPHOCYTES # BLD AUTO: 2 K/UL — SIGNIFICANT CHANGE UP (ref 1–3.3)
LYMPHOCYTES # BLD AUTO: 42.8 % — SIGNIFICANT CHANGE UP (ref 13–44)
MCHC RBC-ENTMCNC: 33.6 GM/DL — SIGNIFICANT CHANGE UP (ref 32–36)
MCHC RBC-ENTMCNC: 34.5 PG — HIGH (ref 27–34)
MCV RBC AUTO: 102.7 FL — HIGH (ref 80–100)
MONOCYTES # BLD AUTO: 0.61 K/UL — SIGNIFICANT CHANGE UP (ref 0–0.9)
MONOCYTES NFR BLD AUTO: 13.1 % — SIGNIFICANT CHANGE UP (ref 2–14)
NEUTROPHILS # BLD AUTO: 1.85 K/UL — SIGNIFICANT CHANGE UP (ref 1.8–7.4)
NEUTROPHILS NFR BLD AUTO: 39.6 % — LOW (ref 43–77)
NRBC # BLD: 0 /100 WBCS — SIGNIFICANT CHANGE UP (ref 0–0)
PLATELET # BLD AUTO: 121 K/UL — LOW (ref 150–400)
POTASSIUM SERPL-MCNC: 4 MMOL/L — SIGNIFICANT CHANGE UP (ref 3.5–5.3)
POTASSIUM SERPL-SCNC: 4 MMOL/L — SIGNIFICANT CHANGE UP (ref 3.5–5.3)
PROTHROM AB SERPL-ACNC: 37.3 SEC — HIGH (ref 9.8–12.7)
RBC # BLD: 3.3 M/UL — LOW (ref 4.2–5.8)
RBC # FLD: 14.3 % — SIGNIFICANT CHANGE UP (ref 10.3–14.5)
SODIUM SERPL-SCNC: 141 MMOL/L — SIGNIFICANT CHANGE UP (ref 135–145)
SPECIMEN SOURCE: SIGNIFICANT CHANGE UP
WBC # BLD: 4.67 K/UL — SIGNIFICANT CHANGE UP (ref 3.8–10.5)
WBC # FLD AUTO: 4.67 K/UL — SIGNIFICANT CHANGE UP (ref 3.8–10.5)

## 2018-04-08 PROCEDURE — 99223 1ST HOSP IP/OBS HIGH 75: CPT

## 2018-04-08 RX ADMIN — Medication 40 MILLIGRAM(S): at 05:56

## 2018-04-08 RX ADMIN — Medication 75 MICROGRAM(S): at 05:56

## 2018-04-08 RX ADMIN — Medication 50 MILLIGRAM(S): at 05:56

## 2018-04-08 RX ADMIN — Medication 120 MILLIGRAM(S): at 05:56

## 2018-04-08 RX ADMIN — SIMVASTATIN 40 MILLIGRAM(S): 20 TABLET, FILM COATED ORAL at 21:13

## 2018-04-08 RX ADMIN — Medication 0.12 MILLIGRAM(S): at 05:56

## 2018-04-08 NOTE — PROGRESS NOTE ADULT - ASSESSMENT
still having dizziness  CT head negative  may have cerebellar infarct  symptoms started approx 3 weeks ago  has PPM so no MRI  await neuro eval  dvt proph

## 2018-04-08 NOTE — CONSULT NOTE ADULT - PROBLEM SELECTOR RECOMMENDATION 9
persistent symptoms,? vestibular, ?vertebrobasilar disease.  consider Ct angio head.  try meclizine 12.5 mg po QID, PRN.  PT evaluation, may benefit from vestibular rehab.  outpatient ENT NATHAN bird.

## 2018-04-08 NOTE — CONSULT NOTE ADULT - ASSESSMENT
89 year old man hx of a-fib, CAD, HTN. c/o  persistent vertigo, non focal exam, Ct of head wo acute changes. unable to go for MRI due to PPM.

## 2018-04-08 NOTE — PROGRESS NOTE ADULT - SUBJECTIVE AND OBJECTIVE BOX
Subjective:  still having dizziness  "body wants to go left"    MEDICATIONS  (STANDING):  dextrose 5%. 1000 milliLiter(s) (50 mL/Hr) IV Continuous <Continuous>  dextrose 50% Injectable 12.5 Gram(s) IV Push once  dextrose 50% Injectable 25 Gram(s) IV Push once  dextrose 50% Injectable 25 Gram(s) IV Push once  digoxin     Tablet 0.125 milliGRAM(s) Oral daily  diltiazem    milliGRAM(s) Oral daily  furosemide    Tablet 40 milliGRAM(s) Oral daily  insulin lispro (HumaLOG) corrective regimen sliding scale   SubCutaneous three times a day before meals  levothyroxine 75 MICROGram(s) Oral daily  metoprolol succinate ER 50 milliGRAM(s) Oral daily  simvastatin 40 milliGRAM(s) Oral at bedtime  warfarin 2.5 milliGRAM(s) Oral daily    MEDICATIONS  (PRN):  acetaminophen   Tablet. 650 milliGRAM(s) Oral every 8 hours PRN Mild Pain (1 - 3)  dextrose Gel 1 Dose(s) Oral once PRN Blood Glucose LESS THAN 70 milliGRAM(s)/deciliter  glucagon  Injectable 1 milliGRAM(s) IntraMuscular once PRN Glucose LESS THAN 70 milligrams/deciliter      Allergies    No Known Allergies    Intolerances        REVIEW OF SYSTEMS:    CONSTITUTIONAL:  As per HPI.  HEENT:  Eyes:  No diplopia or blurred vision. ENT:  No earache, sore throat or runny nose.  CARDIOVASCULAR:  No pressure, squeezing, tightness, heaviness or aching about the chest, neck, axilla or epigastrium.  RESPIRATORY:  No cough, shortness of breath, PND or orthopnea.  GASTROINTESTINAL:  No nausea, vomiting or diarrhea.  GENITOURINARY:  No dysuria, frequency or urgency.  MUSCULOSKELETAL:  no joint pain, deformity, tenderness  EXTREMITIES: no clubbing cyanosis,edema  SKIN:  No change in skin, hair or nails.  NEUROLOGIC:  No paresthesias, fasciculations, seizures or weakness.  PSYCHIATRIC:  No disorder of thought or mood.  ENDOCRINE:  No heat or cold intolerance, polyuria or polydipsia.  HEMATOLOGICAL:  No easy bruising or bleedings:    Vital Signs Last 24 Hrs  T(C): 36.6 (2018 05:55), Max: 36.6 (2018 20:12)  T(F): 97.8 (2018 05:55), Max: 97.9 (2018 20:12)  HR: 73 (2018 05:55) (60 - 84)  BP: 152/74 (2018 05:55) (116/67 - 152/74)  BP(mean): 77 (2018 16:15) (77 - 77)  RR: 16 (2018 05:55) (16 - 20)  SpO2: 98% (2018 05:55) (98% - 100%)    PHYSICAL EXAMINATION:  SKIN: no rashes  HEAD: NC/AT  EYES: PERRLA, EOMI  EARS: TM's intact  NOSE: no abnormalities  NECK:  Supple. No lymphadenopathy. Jugular venous pressure not elevated. Carotids equal.   HEART:   The cardiac impulse has a normal quality. Reg., Nl S1 and S2.    CHEST:  Chest is clear to auscultation. Normal respiratory effort.  ABDOMEN:  Soft and nontender.   EXTREMITIES:  no C/C/E  NEURO: AAO x 3, no focal deficts       LABS:                        11.4   4.67  )-----------( 121      ( 2018 05:31 )             33.9     04-08    141  |  107  |  30<H>  ----------------------------<  79  4.0   |  27  |  0.95    Ca    8.5      2018 05:31    TPro  7.7  /  Alb  4.0  /  TBili  0.8  /  DBili  x   /  AST  40<H>  /  ALT  36  /  AlkPhos  96  04-07    PT/INR - ( 2018 05:31 )   PT: 37.3 sec;   INR: 3.37 ratio         PTT - ( 2018 16:40 )  PTT:53.3 sec  Urinalysis Basic - ( 2018 16:51 )    Color: Yellow / Appearance: Clear / S.010 / pH: x  Gluc: x / Ketone: Negative  / Bili: Negative / Urobili: Negative mg/dL   Blood: x / Protein: Negative mg/dL / Nitrite: Negative   Leuk Esterase: Negative / RBC: x / WBC x   Sq Epi: x / Non Sq Epi: x / Bacteria: x        RADIOLOGY & ADDITIONAL TESTS:

## 2018-04-08 NOTE — CONSULT NOTE ADULT - SUBJECTIVE AND OBJECTIVE BOX
Neurology Consult requested by:   Patient is a 89y old  Male who presents with a chief complaint of complain of severe dizziness for several hours.   HPI:  89 y-o Male with PMHX of A-Fib, CAD, HTN, HLD, Pacemaker, DM presented to the ED for c/o of severe Dizziness. feeling of room spinning, brought on by movement, gait is unsteady, feels he is falling to the left. No associated weakness, numbness, headache, double vision, slurred speech, trouble talking or swallowing.   Pt states he has been having intermittent dizziness x 1 week.   Pt notes he has had episodes like this in the past but normally Sx dissipate on their own, today Sx have not resolved. Denies HA, CP, SOB. Pt's had a fall on January 13th, Ct head was negative. (07 Apr 2018 23:42)      PAST MEDICAL & SURGICAL HISTORY:  Diabetes mellitus  Hyperlipidemia  Hypertension  Coronary artery disease  Pacemaker  Afib  Artificial pacemaker  Stented coronary artery    FAMILY HISTORY:  No pertinent family history in first degree relatives    Social Hx:  Nonsmoker, no drug or alcohol use  Medications and Allergies ReviewedMEDICATIONS  (STANDING):  digoxin     Tablet 0.125 milliGRAM(s) Oral daily  diltiazem    milliGRAM(s) Oral daily  furosemide    Tablet 40 milliGRAM(s) Oral daily  insulin lispro (HumaLOG) corrective regimen sliding scale   SubCutaneous three times a day before meals  levothyroxine 75 MICROGram(s) Oral daily  metoprolol succinate ER 50 milliGRAM(s) Oral daily  simvastatin 40 milliGRAM(s) Oral at bedtime  warfarin 2.5 milliGRAM(s) Oral daily     ROS: Pertinent positives in HPI, all other ROS were reviewed and are negative.      Examination:   Vital Signs Last 24 Hrs  T(C): 36.3 (08 Apr 2018 11:30), Max: 36.6 (07 Apr 2018 20:12)  T(F): 97.3 (08 Apr 2018 11:30), Max: 97.9 (07 Apr 2018 20:12)  HR: 63 (08 Apr 2018 11:30) (60 - 84)  BP: 119/54 (08 Apr 2018 11:30) (116/67 - 152/74)  BP(mean): 77 (07 Apr 2018 16:15) (77 - 77)  RR: 16 (08 Apr 2018 11:30) (16 - 20)  SpO2: 98% (08 Apr 2018 11:30) (98% - 100%)  General: Cooperative, NAD   NECK: supple, no masses  ENT: Normal hearing   Vascular : no carotid bruits,   Lungs: CTAB  Chest: RRR, no murmurs  Extremities: nontender, no edema  Musculoskeletal: no adventitious movements, no joint stiffness  Skin: no rash    Neurological Examination:  NIHSS:  MS: AOx3. nteractive, normal affect. Speech fluent, can name and repeat, no L/R confusion    CN: VFFTC, PERLL, EOMI, V1-3 sensation intact, face symmetric, hearing diminished bilaterally, palate elevates symmetrically, tongue midline, SCM equal bilaterally  Motor: normal bulk and tone, no tremor, rigidity or bradykinesia.  5/5 all over   Sens: Intact to light touch.    Reflexes: 2/4 all over, downgoing toes b/l  Coord:  No dysmetria, GILDA intact   Gait: wide based     Labs:  Basic Metabolic Panel (04.08.18 @ 05:31)    Sodium, Serum: 141 mmol/L    Potassium, Serum: 4.0 mmol/L    Chloride, Serum: 107 mmol/L    Carbon Dioxide, Serum: 27 mmol/L    Anion Gap, Serum: 7 mmol/L    Blood Urea Nitrogen, Serum: 30 mg/dL    Creatinine, Serum: 0.95 mg/dL    Glucose, Serum: 79 mg/dL    Calcium, Total Serum: 8.5 mg/dL    eGFR if Non : 70: Interpretative comment      Imaging:   < from: CT Head No Cont (04.07.18 @ 17:23) >  History: Dizziness    Multiple axial sections were obtained from skull base to the vertex   without intravenous contrast.    Comparison: 1/13/2018    Findings:  There is no intracranial hemorrhage, mass effect or midline shift. No   extra-axial collection is identified. There is no large acute territorial   infarct.  There is moderate prominence of the ventricles and subarachnoid spaces,   compatible with age related involutional changes. Prominent   periventricular lucency is present, compatible with microvascular   ischemic change.  The visualized skull and mastoid are unremarkable.  The paranasal sinuses and orbits are within normal limits.  Impression:  Age related involutional and microvascular ischemic changes, without   evidence of intracranial hemorrhage, mass effect or midline shift.

## 2018-04-09 ENCOUNTER — TRANSCRIPTION ENCOUNTER (OUTPATIENT)
Age: 83
End: 2018-04-09

## 2018-04-09 VITALS — HEART RATE: 61 BPM | TEMPERATURE: 98 F | SYSTOLIC BLOOD PRESSURE: 107 MMHG | OXYGEN SATURATION: 98 %

## 2018-04-09 LAB
GLUCOSE BLDC GLUCOMTR-MCNC: 82 MG/DL — SIGNIFICANT CHANGE UP (ref 70–99)
GLUCOSE BLDC GLUCOMTR-MCNC: 95 MG/DL — SIGNIFICANT CHANGE UP (ref 70–99)
INR BLD: 2.81 RATIO — HIGH (ref 0.88–1.16)
PROTHROM AB SERPL-ACNC: 31 SEC — HIGH (ref 9.8–12.7)

## 2018-04-09 RX ORDER — ACETAMINOPHEN 500 MG
2 TABLET ORAL
Qty: 0 | Refills: 0 | DISCHARGE
Start: 2018-04-09

## 2018-04-09 RX ORDER — MECLIZINE HCL 12.5 MG
1 TABLET ORAL
Qty: 10 | Refills: 0
Start: 2018-04-09

## 2018-04-09 RX ADMIN — Medication 50 MILLIGRAM(S): at 05:40

## 2018-04-09 RX ADMIN — Medication 0.12 MILLIGRAM(S): at 05:40

## 2018-04-09 RX ADMIN — Medication 120 MILLIGRAM(S): at 05:40

## 2018-04-09 RX ADMIN — Medication 40 MILLIGRAM(S): at 05:40

## 2018-04-09 RX ADMIN — Medication 75 MICROGRAM(S): at 05:41

## 2018-04-09 NOTE — DISCHARGE NOTE ADULT - HOSPITAL COURSE
Reason for Admission: complain of severe dizziness for 12 hours	  History of Present Illness: 	  89 y-o Male with PMHX of A-Fib, CAD, HTN, HLD, Pacemaker, DM presented to the ED for c/o of severe Dizziness. Pt states he has been having intermittent dizziness x 1 week. Pt notes he got out of bed today when he felt a sudden onset of dizziness and started having difficulty ambulating, falling to the left. Pt reports when he walks his "body" makes him go left unwillingly. Pt notes he has had episodes like this in the past but normally Sx dissipate on their own, today Sx have not resolved. Denies HA, CP, SOB. Pt's had a fall on January 13th, Ct head was negative.   pt was evaluated on tele without significant events.  He was ruled out for acute CVA and infectious etiology.  UA/UCx negative.  No leukocytosis or significant electrolyte dysfunction.  HD stable.  Pt eager to go home.  Will set up home care. Told he can try meclizine PRN.  He should follow up with his pcp and ENT doctor.     REVIEW OF SYSTEMS: All other review of systems is negative unless indicated above.    Vital Signs Last 24 Hrs  T(C): 36.2 (09 Apr 2018 11:33), Max: 36.3 (08 Apr 2018 17:35)  T(F): 97.1 (09 Apr 2018 11:33), Max: 97.4 (08 Apr 2018 17:35)  HR: 67 (09 Apr 2018 11:33) (67 - 71)  BP: 124/56 (09 Apr 2018 11:33) (119/65 - 124/70)  RR: 18 (09 Apr 2018 11:33) (18 - 18)  SpO2: 98% (09 Apr 2018 11:33) (98% - 99%)    PHYSICAL EXAM:    Constitutional: NAD, awake and alert, elderly, frail  HEENT: PERR, EOMI, Normal Hearing, MMM  Neck: Soft and supple  Respiratory: Breath sounds are clear bilaterally, No wheezing, rales or rhonchi  Cardiovascular: S1 and S2, regular rate and rhythm, no Murmurs, gallops or rubs  Gastrointestinal: Bowel Sounds present, soft, nontender, nondistended, no guarding, no rebound  Extremities: No peripheral edema  Neurological: A/O x 3, no focal deficits in my limited exam  Skin: No rashes    meds/labs: Reviewed.    Assessment and Plan:  89 yo male presented with severe dizziness.    Severe Dizziness w/ unsteady gait: Likely vestibular in etiology.  CT head neg, blood work largely unremarble for significant electrolyte dysfunction.  Ruled out for infectious etiology.  No significant arrythmias on tele.  Pt should follow up with ENT as outpatient.  Meclizine PRN.    Chronic A fib: HR controlled. Continue coumadin for goal INR 2-3    CAD/HTN/Hyperlipidemia: stable, continue statin.    DM: resume home meds on discharge.    Attending Statement: 40 minutes spent on total encounter; more than 50% of the visit was spent counseling and/or coordinating care by the attending physician. Reason for Admission: complain of severe dizziness for 12 hours	  History of Present Illness: 	  89 y-o Male with PMHX of A-Fib, CAD, HTN, HLD, Pacemaker, DM presented to the ED for c/o of severe Dizziness. Pt states he has been having intermittent dizziness x 1 week. Pt notes he got out of bed today when he felt a sudden onset of dizziness and started having difficulty ambulating, falling to the left. Pt reports when he walks his "body" makes him go left unwillingly. Pt notes he has had episodes like this in the past but normally Sx dissipate on their own, today Sx have not resolved. Denies HA, CP, SOB. Pt's had a fall on January 13th, Ct head was negative.   pt was evaluated on tele without significant events.  He was ruled out for acute CVA and infectious etiology.  UA/UCx negative.  No leukocytosis or significant electrolyte dysfunction.  HD stable.  Pt eager to go home.  Told he can try meclizine PRN.  He should follow up with his pcp and ENT doctor.     REVIEW OF SYSTEMS: All other review of systems is negative unless indicated above.    Vital Signs Last 24 Hrs  T(C): 36.2 (09 Apr 2018 11:33), Max: 36.3 (08 Apr 2018 17:35)  T(F): 97.1 (09 Apr 2018 11:33), Max: 97.4 (08 Apr 2018 17:35)  HR: 67 (09 Apr 2018 11:33) (67 - 71)  BP: 124/56 (09 Apr 2018 11:33) (119/65 - 124/70)  RR: 18 (09 Apr 2018 11:33) (18 - 18)  SpO2: 98% (09 Apr 2018 11:33) (98% - 99%)    PHYSICAL EXAM:    Constitutional: NAD, awake and alert, elderly, frail  HEENT: PERR, EOMI, Normal Hearing, MMM  Neck: Soft and supple  Respiratory: Breath sounds are clear bilaterally, No wheezing, rales or rhonchi  Cardiovascular: S1 and S2, regular rate and rhythm, no Murmurs, gallops or rubs  Gastrointestinal: Bowel Sounds present, soft, nontender, nondistended, no guarding, no rebound  Extremities: No peripheral edema  Neurological: A/O x 3, no focal deficits in my limited exam  Skin: No rashes    meds/labs: Reviewed.    Assessment and Plan:  89 yo male presented with severe dizziness.    Severe Dizziness w/ unsteady gait: Likely vestibular in etiology.  CT head neg, blood work largely unremarble for significant electrolyte dysfunction.  Ruled out for infectious etiology.  No significant arrythmias on tele.  Pt should follow up with ENT as outpatient.  Meclizine PRN.    Chronic A fib: HR controlled. Continue coumadin for goal INR 2-3    CAD/HTN/Hyperlipidemia: stable, continue statin.    DM: resume home meds on discharge.    Attending Statement: 40 minutes spent on total encounter; more than 50% of the visit was spent counseling and/or coordinating care by the attending physician.

## 2018-04-09 NOTE — DISCHARGE NOTE ADULT - CARE PLAN
Principal Discharge DX:	Dizziness  Goal:	resolution of symptoms.  Assessment and plan of treatment:	Likely vestibular disease. If symptoms recur try low dose meclizine as needed (Sent to your pharmacy).  Follow up with an ENT physician for further evaluation.

## 2018-04-09 NOTE — PHYSICAL THERAPY INITIAL EVALUATION ADULT - MODALITIES TREATMENT COMMENTS
pt left seated in chair post Eval; chair alarm donned; callbell in reach; pt instructed not to get up alone; call nursing for assist; dalila well; denied pain / no c/o dizziness; RN Yudy made aware pt OOB

## 2018-04-09 NOTE — DISCHARGE NOTE ADULT - PLAN OF CARE
resolution of symptoms. Likely vestibular disease. If symptoms recur try low dose meclizine as needed (Sent to your pharmacy).  Follow up with an ENT physician for further evaluation.

## 2018-04-09 NOTE — DISCHARGE NOTE ADULT - MEDICATION SUMMARY - MEDICATIONS TO TAKE
I will START or STAY ON the medications listed below when I get home from the hospital:    acetaminophen 325 mg oral tablet  -- 2 tab(s) by mouth every 8 hours, As needed, Mild Pain (1 - 3)  -- Indication: For resume home med    Cartia  mg/24 hours oral capsule, extended release  -- 1 cap(s) by mouth once a day  -- Indication: For resume home med    digoxin 125 mcg (0.125 mg) oral tablet  -- 1 tab(s) by mouth once a day  -- Indication: For resume home med    Coumadin 2.5 mg oral tablet  -- 1 tab(s) by mouth once a day except friday  -- Indication: For resume home med    glipiZIDE 2.5 mg oral tablet, extended release  -- 1 tab(s) by mouth once a day  -- Indication: For resume home med    meclizine 12.5 mg oral tablet  -- 1 tab(s) by mouth 2 times a day, As Needed -for dizziness   -- May cause drowsiness.  Alcohol may intensify this effect.  Use care when operating dangerous machinery.    -- Indication: For Dizziness    simvastatin 40 mg oral tablet  -- 1 tab(s) by mouth once a day (at bedtime)  -- Indication: For resume home med    metoprolol succinate 50 mg oral tablet, extended release  -- 1 tab(s) by mouth once a day  -- Indication: For resume home med    Lasix 40 mg oral tablet  -- 1 tab(s) by mouth once a day  -- Indication: For resume home med    potassium chloride 20 mEq oral granule, extended release  --  by mouth   -- Indication: For resume home med    levothyroxine 75 mcg (0.075 mg) oral capsule  -- 1 cap(s) by mouth once a day  -- Indication: For resume home med    Calcium 600+D oral tablet  -- 1 tab(s) by mouth once a day  -- Indication: For resume home med    Vitamin C 500 mg oral tablet  -- 1 tab(s) by mouth once a day  -- Indication: For resume home med

## 2018-04-09 NOTE — DISCHARGE NOTE ADULT - PATIENT PORTAL LINK FT
You can access the [facility] Patient Portal, offered by [facility], by registering with the following website: [email or web address]

## 2018-04-11 DIAGNOSIS — Z79.01 LONG TERM (CURRENT) USE OF ANTICOAGULANTS: ICD-10-CM

## 2018-04-11 DIAGNOSIS — I48.2 CHRONIC ATRIAL FIBRILLATION: ICD-10-CM

## 2018-04-11 DIAGNOSIS — I25.10 ATHEROSCLEROTIC HEART DISEASE OF NATIVE CORONARY ARTERY WITHOUT ANGINA PECTORIS: ICD-10-CM

## 2018-04-11 DIAGNOSIS — E11.9 TYPE 2 DIABETES MELLITUS WITHOUT COMPLICATIONS: ICD-10-CM

## 2018-04-11 DIAGNOSIS — Z95.0 PRESENCE OF CARDIAC PACEMAKER: ICD-10-CM

## 2018-04-11 DIAGNOSIS — Z95.5 PRESENCE OF CORONARY ANGIOPLASTY IMPLANT AND GRAFT: ICD-10-CM

## 2018-04-11 DIAGNOSIS — H81.20 VESTIBULAR NEURONITIS, UNSPECIFIED EAR: ICD-10-CM

## 2018-04-11 DIAGNOSIS — E78.5 HYPERLIPIDEMIA, UNSPECIFIED: ICD-10-CM

## 2018-04-11 DIAGNOSIS — R26.81 UNSTEADINESS ON FEET: ICD-10-CM

## 2018-04-11 DIAGNOSIS — R42 DIZZINESS AND GIDDINESS: ICD-10-CM

## 2018-04-11 DIAGNOSIS — I10 ESSENTIAL (PRIMARY) HYPERTENSION: ICD-10-CM

## 2018-04-24 NOTE — ED PROVIDER NOTE - CROS ED NEURO ALL NEG
Patient went in for 1st OB appointment Thursday and told the fetus had no heart beat. Her HCG at the time measured 8 weeks. Patient had cytotak inserted this morning at 1130. Since then patient states she has been bleeding heavily with large clots.  Called - - -

## 2018-04-25 ENCOUNTER — APPOINTMENT (OUTPATIENT)
Dept: OTOLARYNGOLOGY | Facility: CLINIC | Age: 83
End: 2018-04-25
Payer: MEDICARE

## 2018-04-25 VITALS
HEIGHT: 69 IN | WEIGHT: 150 LBS | HEART RATE: 83 BPM | BODY MASS INDEX: 22.22 KG/M2 | SYSTOLIC BLOOD PRESSURE: 130 MMHG | DIASTOLIC BLOOD PRESSURE: 77 MMHG

## 2018-04-25 DIAGNOSIS — Z86.39 PERSONAL HISTORY OF OTHER ENDOCRINE, NUTRITIONAL AND METABOLIC DISEASE: ICD-10-CM

## 2018-04-25 DIAGNOSIS — R42 DIZZINESS AND GIDDINESS: ICD-10-CM

## 2018-04-25 DIAGNOSIS — Z80.9 FAMILY HISTORY OF MALIGNANT NEOPLASM, UNSPECIFIED: ICD-10-CM

## 2018-04-25 DIAGNOSIS — H68.023 CHRONIC EUSTACHIAN SALPINGITIS, BILATERAL: ICD-10-CM

## 2018-04-25 DIAGNOSIS — Z82.49 FAMILY HISTORY OF ISCHEMIC HEART DISEASE AND OTHER DISEASES OF THE CIRCULATORY SYSTEM: ICD-10-CM

## 2018-04-25 PROCEDURE — 99204 OFFICE O/P NEW MOD 45 MIN: CPT

## 2018-04-25 PROCEDURE — 92557 COMPREHENSIVE HEARING TEST: CPT

## 2018-04-25 RX ORDER — SIMVASTATIN 40 MG/1
40 TABLET, FILM COATED ORAL
Refills: 0 | Status: ACTIVE | COMMUNITY

## 2018-04-25 RX ORDER — LEVOTHYROXINE SODIUM 0.07 MG/1
75 TABLET ORAL
Refills: 0 | Status: ACTIVE | COMMUNITY

## 2018-04-25 RX ORDER — DIGOXIN 125 UG/1
125 TABLET ORAL
Refills: 0 | Status: ACTIVE | COMMUNITY

## 2018-04-25 RX ORDER — GLIPIZIDE 2.5 MG/1
2.5 TABLET, FILM COATED, EXTENDED RELEASE ORAL
Refills: 0 | Status: ACTIVE | COMMUNITY

## 2018-04-25 RX ORDER — METOPROLOL SUCCINATE 50 MG/1
50 TABLET, EXTENDED RELEASE ORAL
Refills: 0 | Status: ACTIVE | COMMUNITY

## 2018-04-25 RX ORDER — MULTIVIT-MIN/FA/LYCOPEN/LUTEIN .4-300-25
TABLET ORAL
Refills: 0 | Status: ACTIVE | COMMUNITY

## 2018-04-25 RX ORDER — DILTIAZEM HYDROCHLORIDE 120 MG/1
120 CAPSULE, EXTENDED RELEASE ORAL
Refills: 0 | Status: ACTIVE | COMMUNITY

## 2018-04-25 RX ORDER — WARFARIN 2.5 MG/1
2.5 TABLET ORAL
Refills: 0 | Status: ACTIVE | COMMUNITY

## 2018-04-25 RX ORDER — FUROSEMIDE 40 MG/1
40 TABLET ORAL
Refills: 0 | Status: ACTIVE | COMMUNITY

## 2018-04-25 RX ORDER — POTASSIUM CHLORIDE 1500 MG/1
20 TABLET, FILM COATED, EXTENDED RELEASE ORAL
Refills: 0 | Status: ACTIVE | COMMUNITY

## 2018-04-25 RX ORDER — UBIDECARENONE 200 MG
500 CAPSULE ORAL
Refills: 0 | Status: ACTIVE | COMMUNITY

## 2018-04-25 RX ORDER — SPIRONOLACTONE 25 MG
600-400 TABLET ORAL
Refills: 0 | Status: ACTIVE | COMMUNITY

## 2018-06-29 ENCOUNTER — APPOINTMENT (OUTPATIENT)
Dept: ELECTROPHYSIOLOGY | Facility: CLINIC | Age: 83
End: 2018-06-29
Payer: MEDICARE

## 2018-06-29 VITALS
BODY MASS INDEX: 22.22 KG/M2 | SYSTOLIC BLOOD PRESSURE: 102 MMHG | WEIGHT: 150 LBS | DIASTOLIC BLOOD PRESSURE: 70 MMHG | HEART RATE: 64 BPM | HEIGHT: 69 IN

## 2018-06-29 PROCEDURE — 93279 PRGRMG DEV EVAL PM/LDLS PM: CPT

## 2018-10-03 ENCOUNTER — APPOINTMENT (OUTPATIENT)
Dept: ELECTROPHYSIOLOGY | Facility: CLINIC | Age: 83
End: 2018-10-03
Payer: MEDICARE

## 2018-10-03 VITALS
HEIGHT: 69 IN | SYSTOLIC BLOOD PRESSURE: 122 MMHG | WEIGHT: 152 LBS | DIASTOLIC BLOOD PRESSURE: 68 MMHG | BODY MASS INDEX: 22.51 KG/M2 | HEART RATE: 86 BPM

## 2018-10-03 PROBLEM — Z95.0 PRESENCE OF CARDIAC PACEMAKER: Chronic | Status: ACTIVE | Noted: 2017-11-10

## 2018-10-03 PROBLEM — I48.91 UNSPECIFIED ATRIAL FIBRILLATION: Chronic | Status: ACTIVE | Noted: 2017-11-10

## 2018-10-03 PROBLEM — E11.9 TYPE 2 DIABETES MELLITUS WITHOUT COMPLICATIONS: Chronic | Status: ACTIVE | Noted: 2018-01-13

## 2018-10-03 PROBLEM — I10 ESSENTIAL (PRIMARY) HYPERTENSION: Chronic | Status: ACTIVE | Noted: 2018-01-13

## 2018-10-03 PROBLEM — E78.5 HYPERLIPIDEMIA, UNSPECIFIED: Chronic | Status: ACTIVE | Noted: 2018-01-13

## 2018-10-03 PROBLEM — I25.10 ATHEROSCLEROTIC HEART DISEASE OF NATIVE CORONARY ARTERY WITHOUT ANGINA PECTORIS: Chronic | Status: ACTIVE | Noted: 2018-01-13

## 2018-10-03 PROCEDURE — 93279 PRGRMG DEV EVAL PM/LDLS PM: CPT

## 2018-10-03 RX ORDER — PREDNISONE 10 MG/1
10 TABLET ORAL
Qty: 18 | Refills: 1 | Status: DISCONTINUED | COMMUNITY
Start: 2018-04-25 | End: 2018-10-03

## 2019-02-15 ENCOUNTER — APPOINTMENT (OUTPATIENT)
Dept: ELECTROPHYSIOLOGY | Facility: CLINIC | Age: 84
End: 2019-02-15
Payer: MEDICARE

## 2019-02-15 VITALS
BODY MASS INDEX: 22.81 KG/M2 | SYSTOLIC BLOOD PRESSURE: 130 MMHG | HEART RATE: 76 BPM | WEIGHT: 154 LBS | DIASTOLIC BLOOD PRESSURE: 70 MMHG | HEIGHT: 69 IN

## 2019-02-15 PROCEDURE — 93279 PRGRMG DEV EVAL PM/LDLS PM: CPT

## 2019-05-02 ENCOUNTER — APPOINTMENT (OUTPATIENT)
Dept: OTOLARYNGOLOGY | Facility: CLINIC | Age: 84
End: 2019-05-02
Payer: MEDICARE

## 2019-05-02 VITALS
HEIGHT: 69 IN | BODY MASS INDEX: 22.66 KG/M2 | HEART RATE: 92 BPM | WEIGHT: 153 LBS | SYSTOLIC BLOOD PRESSURE: 115 MMHG | DIASTOLIC BLOOD PRESSURE: 65 MMHG

## 2019-05-02 DIAGNOSIS — H61.23 IMPACTED CERUMEN, BILATERAL: ICD-10-CM

## 2019-05-02 DIAGNOSIS — R13.14 DYSPHAGIA, PHARYNGOESOPHAGEAL PHASE: ICD-10-CM

## 2019-05-02 DIAGNOSIS — R13.10 DYSPHAGIA, UNSPECIFIED: ICD-10-CM

## 2019-05-02 DIAGNOSIS — H90.3 SENSORINEURAL HEARING LOSS, BILATERAL: ICD-10-CM

## 2019-05-02 PROCEDURE — 31575 DIAGNOSTIC LARYNGOSCOPY: CPT

## 2019-05-02 PROCEDURE — 99213 OFFICE O/P EST LOW 20 MIN: CPT | Mod: 25

## 2019-05-02 NOTE — HISTORY OF PRESENT ILLNESS
[de-identified] : co ears plugging\par hx sn loss\par co difficulty swallowing unsure if  sensation is thrat or in chest\par no worsening of symptoms

## 2019-05-02 NOTE — PROCEDURE
[de-identified] : Scope # \par Topical anesthesia with viscous xylocaine 2% is applied to the anterior nares.\par A flexible fiberoptic laryngoscope is than introduced through the nares.\par The nasopharynx is clear without mass or inflammation.\par The posterior pharyngeal wall is unremarkable.\par The tongue base and vallecula are unremarkable.\par The supraglottic larynx is within normal limits.\par Both vocal cords are fully mobile with no nodule, polyp or other lesion present.\par There is no edema or erythema overlying the arytenoid cartilages or the inter-arytenoid space.\par The subglottic space is clear.\par The voice has a  normal quality.\par \par \par \par

## 2019-05-02 NOTE — PHYSICAL EXAM
[Laryngoscopy Performed] : laryngoscopy was performed, see procedure section for findings [Midline] : trachea located in midline position [Normal] : orientation to person, place, and time: normal

## 2019-05-22 ENCOUNTER — APPOINTMENT (OUTPATIENT)
Dept: ELECTROPHYSIOLOGY | Facility: CLINIC | Age: 84
End: 2019-05-22
Payer: MEDICARE

## 2019-05-22 VITALS — DIASTOLIC BLOOD PRESSURE: 62 MMHG | HEART RATE: 92 BPM | SYSTOLIC BLOOD PRESSURE: 123 MMHG

## 2019-05-22 DIAGNOSIS — I48.91 UNSPECIFIED ATRIAL FIBRILLATION: ICD-10-CM

## 2019-05-22 DIAGNOSIS — Z95.0 PRESENCE OF CARDIAC PACEMAKER: ICD-10-CM

## 2019-05-22 PROCEDURE — 93279 PRGRMG DEV EVAL PM/LDLS PM: CPT

## 2019-07-05 ENCOUNTER — EMERGENCY (EMERGENCY)
Facility: HOSPITAL | Age: 84
LOS: 0 days | Discharge: LEFT BEFORE DISCHARGE | End: 2019-07-05
Attending: EMERGENCY MEDICINE | Admitting: EMERGENCY MEDICINE
Payer: MEDICARE

## 2019-07-05 VITALS
RESPIRATION RATE: 18 BRPM | SYSTOLIC BLOOD PRESSURE: 112 MMHG | DIASTOLIC BLOOD PRESSURE: 52 MMHG | TEMPERATURE: 98 F | HEART RATE: 66 BPM | OXYGEN SATURATION: 94 %

## 2019-07-05 VITALS — HEIGHT: 65 IN | WEIGHT: 145.06 LBS

## 2019-07-05 DIAGNOSIS — I25.10 ATHEROSCLEROTIC HEART DISEASE OF NATIVE CORONARY ARTERY WITHOUT ANGINA PECTORIS: ICD-10-CM

## 2019-07-05 DIAGNOSIS — E11.9 TYPE 2 DIABETES MELLITUS WITHOUT COMPLICATIONS: ICD-10-CM

## 2019-07-05 DIAGNOSIS — Z79.84 LONG TERM (CURRENT) USE OF ORAL HYPOGLYCEMIC DRUGS: ICD-10-CM

## 2019-07-05 DIAGNOSIS — D72.829 ELEVATED WHITE BLOOD CELL COUNT, UNSPECIFIED: ICD-10-CM

## 2019-07-05 DIAGNOSIS — Z95.0 PRESENCE OF CARDIAC PACEMAKER: ICD-10-CM

## 2019-07-05 DIAGNOSIS — Z95.5 PRESENCE OF CORONARY ANGIOPLASTY IMPLANT AND GRAFT: Chronic | ICD-10-CM

## 2019-07-05 DIAGNOSIS — M54.9 DORSALGIA, UNSPECIFIED: ICD-10-CM

## 2019-07-05 DIAGNOSIS — Z79.01 LONG TERM (CURRENT) USE OF ANTICOAGULANTS: ICD-10-CM

## 2019-07-05 DIAGNOSIS — Z95.0 PRESENCE OF CARDIAC PACEMAKER: Chronic | ICD-10-CM

## 2019-07-05 DIAGNOSIS — I48.91 UNSPECIFIED ATRIAL FIBRILLATION: ICD-10-CM

## 2019-07-05 LAB
ALBUMIN SERPL ELPH-MCNC: 3.6 G/DL — SIGNIFICANT CHANGE UP (ref 3.3–5)
ALP SERPL-CCNC: 81 U/L — SIGNIFICANT CHANGE UP (ref 40–120)
ALT FLD-CCNC: 20 U/L — SIGNIFICANT CHANGE UP (ref 12–78)
ANION GAP SERPL CALC-SCNC: 6 MMOL/L — SIGNIFICANT CHANGE UP (ref 5–17)
APPEARANCE UR: CLEAR — SIGNIFICANT CHANGE UP
APPEARANCE UR: CLEAR — SIGNIFICANT CHANGE UP
APTT BLD: 59.6 SEC — HIGH (ref 27.5–36.3)
AST SERPL-CCNC: 33 U/L — SIGNIFICANT CHANGE UP (ref 15–37)
BILIRUB SERPL-MCNC: 1.1 MG/DL — SIGNIFICANT CHANGE UP (ref 0.2–1.2)
BILIRUB UR-MCNC: NEGATIVE — SIGNIFICANT CHANGE UP
BILIRUB UR-MCNC: NEGATIVE — SIGNIFICANT CHANGE UP
BUN SERPL-MCNC: 18 MG/DL — SIGNIFICANT CHANGE UP (ref 7–23)
CALCIUM SERPL-MCNC: 8.6 MG/DL — SIGNIFICANT CHANGE UP (ref 8.5–10.1)
CHLORIDE SERPL-SCNC: 100 MMOL/L — SIGNIFICANT CHANGE UP (ref 96–108)
CO2 SERPL-SCNC: 29 MMOL/L — SIGNIFICANT CHANGE UP (ref 22–31)
COLOR SPEC: YELLOW — SIGNIFICANT CHANGE UP
COLOR SPEC: YELLOW — SIGNIFICANT CHANGE UP
CREAT SERPL-MCNC: 1.13 MG/DL — SIGNIFICANT CHANGE UP (ref 0.5–1.3)
DIFF PNL FLD: ABNORMAL
DIFF PNL FLD: ABNORMAL
GLUCOSE SERPL-MCNC: 126 MG/DL — HIGH (ref 70–99)
GLUCOSE UR QL: NEGATIVE MG/DL — SIGNIFICANT CHANGE UP
GLUCOSE UR QL: NEGATIVE MG/DL — SIGNIFICANT CHANGE UP
HCT VFR BLD CALC: 40.1 % — SIGNIFICANT CHANGE UP (ref 39–50)
HGB BLD-MCNC: 13.4 G/DL — SIGNIFICANT CHANGE UP (ref 13–17)
INR BLD: 5.35 RATIO — CRITICAL HIGH (ref 0.88–1.16)
KETONES UR-MCNC: NEGATIVE — SIGNIFICANT CHANGE UP
KETONES UR-MCNC: NEGATIVE — SIGNIFICANT CHANGE UP
LEUKOCYTE ESTERASE UR-ACNC: NEGATIVE — SIGNIFICANT CHANGE UP
LEUKOCYTE ESTERASE UR-ACNC: NEGATIVE — SIGNIFICANT CHANGE UP
MAGNESIUM SERPL-MCNC: 2.1 MG/DL — SIGNIFICANT CHANGE UP (ref 1.6–2.6)
MCHC RBC-ENTMCNC: 33.4 GM/DL — SIGNIFICANT CHANGE UP (ref 32–36)
MCHC RBC-ENTMCNC: 34.1 PG — HIGH (ref 27–34)
MCV RBC AUTO: 102 FL — HIGH (ref 80–100)
NITRITE UR-MCNC: NEGATIVE — SIGNIFICANT CHANGE UP
NITRITE UR-MCNC: NEGATIVE — SIGNIFICANT CHANGE UP
NT-PROBNP SERPL-SCNC: 5881 PG/ML — HIGH (ref 0–450)
PH UR: 7 — SIGNIFICANT CHANGE UP (ref 5–8)
PH UR: 7 — SIGNIFICANT CHANGE UP (ref 5–8)
PLATELET # BLD AUTO: 185 K/UL — SIGNIFICANT CHANGE UP (ref 150–400)
POTASSIUM SERPL-MCNC: 3.7 MMOL/L — SIGNIFICANT CHANGE UP (ref 3.5–5.3)
POTASSIUM SERPL-SCNC: 3.7 MMOL/L — SIGNIFICANT CHANGE UP (ref 3.5–5.3)
PROT SERPL-MCNC: 7.4 GM/DL — SIGNIFICANT CHANGE UP (ref 6–8.3)
PROT UR-MCNC: NEGATIVE MG/DL — SIGNIFICANT CHANGE UP
PROT UR-MCNC: NEGATIVE MG/DL — SIGNIFICANT CHANGE UP
PROTHROM AB SERPL-ACNC: 62.5 SEC — HIGH (ref 10–12.9)
RBC # BLD: 3.93 M/UL — LOW (ref 4.2–5.8)
RBC # FLD: 13.2 % — SIGNIFICANT CHANGE UP (ref 10.3–14.5)
RBC CASTS # UR COMP ASSIST: SIGNIFICANT CHANGE UP /HPF (ref 0–4)
RBC CASTS # UR COMP ASSIST: SIGNIFICANT CHANGE UP /HPF (ref 0–4)
SODIUM SERPL-SCNC: 135 MMOL/L — SIGNIFICANT CHANGE UP (ref 135–145)
SP GR SPEC: 1 — LOW (ref 1.01–1.02)
SP GR SPEC: 1 — LOW (ref 1.01–1.02)
TROPONIN I SERPL-MCNC: 0.03 NG/ML — SIGNIFICANT CHANGE UP (ref 0.01–0.04)
UROBILINOGEN FLD QL: NEGATIVE MG/DL — SIGNIFICANT CHANGE UP
UROBILINOGEN FLD QL: NEGATIVE MG/DL — SIGNIFICANT CHANGE UP
WBC # BLD: 16.99 K/UL — HIGH (ref 3.8–10.5)
WBC # FLD AUTO: 16.99 K/UL — HIGH (ref 3.8–10.5)
WBC UR QL: NEGATIVE — SIGNIFICANT CHANGE UP
WBC UR QL: NEGATIVE — SIGNIFICANT CHANGE UP

## 2019-07-05 PROCEDURE — 71045 X-RAY EXAM CHEST 1 VIEW: CPT | Mod: 26

## 2019-07-05 PROCEDURE — 99285 EMERGENCY DEPT VISIT HI MDM: CPT

## 2019-07-05 PROCEDURE — 93010 ELECTROCARDIOGRAM REPORT: CPT

## 2019-07-05 RX ORDER — LIDOCAINE 4 G/100G
1 CREAM TOPICAL ONCE
Refills: 0 | Status: COMPLETED | OUTPATIENT
Start: 2019-07-05 | End: 2019-07-05

## 2019-07-05 RX ORDER — ONDANSETRON 8 MG/1
4 TABLET, FILM COATED ORAL ONCE
Refills: 0 | Status: COMPLETED | OUTPATIENT
Start: 2019-07-05 | End: 2019-07-05

## 2019-07-05 RX ORDER — OXYCODONE AND ACETAMINOPHEN 5; 325 MG/1; MG/1
1 TABLET ORAL ONCE
Refills: 0 | Status: DISCONTINUED | OUTPATIENT
Start: 2019-07-05 | End: 2019-07-05

## 2019-07-05 RX ADMIN — OXYCODONE AND ACETAMINOPHEN 1 TABLET(S): 5; 325 TABLET ORAL at 18:04

## 2019-07-05 RX ADMIN — LIDOCAINE 1 PATCH: 4 CREAM TOPICAL at 18:20

## 2019-07-05 RX ADMIN — OXYCODONE AND ACETAMINOPHEN 1 TABLET(S): 5; 325 TABLET ORAL at 18:20

## 2019-07-05 RX ADMIN — ONDANSETRON 4 MILLIGRAM(S): 8 TABLET, FILM COATED ORAL at 18:04

## 2019-07-05 NOTE — ED ADULT NURSE NOTE - NSIMPLEMENTINTERV_GEN_ALL_ED
Implemented All Fall with Harm Risk Interventions:  [place] to call system. Call bell, personal items and telephone within reach. Instruct patient to call for assistance. Room bathroom lighting operational. Non-slip footwear when patient is off stretcher. Physically safe environment: no spills, clutter or unnecessary equipment. Stretcher in lowest position, wheels locked, appropriate side rails in place. Provide visual cue, wrist band, yellow gown, etc. Monitor gait and stability. Monitor for mental status changes and reorient to person, place, and time. Review medications for side effects contributing to fall risk. Reinforce activity limits and safety measures with patient and family. Provide visual clues: red socks.

## 2019-07-05 NOTE — ED PROVIDER NOTE - PROGRESS NOTE DETAILS
so dr stewart, labs pending, dispo pending ARTHUR Espinosa DO Wife states pt wishes to go home. Understands [place] [id number]. He is not eating and drinking refused diet in er. Wife states he is too uncomfortable and will sign ama. Understands risks. Kurt ROUSE

## 2019-07-05 NOTE — ED PROVIDER NOTE - OBJECTIVE STATEMENT
89 y/o male with a PMHx of Afib, CAD, DM, HTN, HLD, presents to the ED c/o back pain. Pt has been seen at [facility] for complaint. Decreased PO intake. PCP: Dr. La. No other complaints at this time.

## 2019-07-05 NOTE — ED ADULT NURSE NOTE - EXPLANATION OF PATIENT'S REASON FOR LEAVING
Pt stated he did not want to stay in ED. Dr Hicks, RN and family at bedside. IV catheter discontinued. Pt left ED without incident. Pt is alert and oriented x4

## 2019-07-05 NOTE — ED ADULT NURSE REASSESSMENT NOTE - NS ED NURSE REASSESS COMMENT FT1
pt difficult stick called lab numerous times to obtain bloods, Stephanie Nurse manager made aware of delay

## 2019-07-06 LAB
CULTURE RESULTS: SIGNIFICANT CHANGE UP
SPECIMEN SOURCE: SIGNIFICANT CHANGE UP

## 2019-07-08 ENCOUNTER — INPATIENT (INPATIENT)
Facility: HOSPITAL | Age: 84
LOS: 1 days | End: 2019-07-10
Attending: INTERNAL MEDICINE | Admitting: INTERNAL MEDICINE
Payer: MEDICARE

## 2019-07-08 VITALS
RESPIRATION RATE: 19 BRPM | HEART RATE: 91 BPM | TEMPERATURE: 98 F | SYSTOLIC BLOOD PRESSURE: 123 MMHG | OXYGEN SATURATION: 100 % | WEIGHT: 154.98 LBS | DIASTOLIC BLOOD PRESSURE: 57 MMHG

## 2019-07-08 DIAGNOSIS — I48.2 CHRONIC ATRIAL FIBRILLATION: ICD-10-CM

## 2019-07-08 DIAGNOSIS — Z95.0 PRESENCE OF CARDIAC PACEMAKER: Chronic | ICD-10-CM

## 2019-07-08 DIAGNOSIS — D72.829 ELEVATED WHITE BLOOD CELL COUNT, UNSPECIFIED: ICD-10-CM

## 2019-07-08 DIAGNOSIS — Z95.0 PRESENCE OF CARDIAC PACEMAKER: ICD-10-CM

## 2019-07-08 DIAGNOSIS — I24.8 OTHER FORMS OF ACUTE ISCHEMIC HEART DISEASE: ICD-10-CM

## 2019-07-08 DIAGNOSIS — I13.0 HYPERTENSIVE HEART AND CHRONIC KIDNEY DISEASE WITH HEART FAILURE AND STAGE 1 THROUGH STAGE 4 CHRONIC KIDNEY DISEASE, OR UNSPECIFIED CHRONIC KIDNEY DISEASE: ICD-10-CM

## 2019-07-08 DIAGNOSIS — E11.649 TYPE 2 DIABETES MELLITUS WITH HYPOGLYCEMIA WITHOUT COMA: ICD-10-CM

## 2019-07-08 DIAGNOSIS — Z95.5 PRESENCE OF CORONARY ANGIOPLASTY IMPLANT AND GRAFT: Chronic | ICD-10-CM

## 2019-07-08 DIAGNOSIS — E43 UNSPECIFIED SEVERE PROTEIN-CALORIE MALNUTRITION: ICD-10-CM

## 2019-07-08 DIAGNOSIS — G92 TOXIC ENCEPHALOPATHY: ICD-10-CM

## 2019-07-08 DIAGNOSIS — J96.01 ACUTE RESPIRATORY FAILURE WITH HYPOXIA: ICD-10-CM

## 2019-07-08 DIAGNOSIS — E78.5 HYPERLIPIDEMIA, UNSPECIFIED: ICD-10-CM

## 2019-07-08 DIAGNOSIS — N18.2 CHRONIC KIDNEY DISEASE, STAGE 2 (MILD): ICD-10-CM

## 2019-07-08 DIAGNOSIS — Z79.01 LONG TERM (CURRENT) USE OF ANTICOAGULANTS: ICD-10-CM

## 2019-07-08 DIAGNOSIS — Z79.84 LONG TERM (CURRENT) USE OF ORAL HYPOGLYCEMIC DRUGS: ICD-10-CM

## 2019-07-08 DIAGNOSIS — R33.9 RETENTION OF URINE, UNSPECIFIED: ICD-10-CM

## 2019-07-08 DIAGNOSIS — E88.09 OTHER DISORDERS OF PLASMA-PROTEIN METABOLISM, NOT ELSEWHERE CLASSIFIED: ICD-10-CM

## 2019-07-08 DIAGNOSIS — I48.91 UNSPECIFIED ATRIAL FIBRILLATION: ICD-10-CM

## 2019-07-08 DIAGNOSIS — G89.29 OTHER CHRONIC PAIN: ICD-10-CM

## 2019-07-08 DIAGNOSIS — Z66 DO NOT RESUSCITATE: ICD-10-CM

## 2019-07-08 DIAGNOSIS — Z87.891 PERSONAL HISTORY OF NICOTINE DEPENDENCE: ICD-10-CM

## 2019-07-08 DIAGNOSIS — I50.33 ACUTE ON CHRONIC DIASTOLIC (CONGESTIVE) HEART FAILURE: ICD-10-CM

## 2019-07-08 DIAGNOSIS — Z95.5 PRESENCE OF CORONARY ANGIOPLASTY IMPLANT AND GRAFT: ICD-10-CM

## 2019-07-08 DIAGNOSIS — Z79.891 LONG TERM (CURRENT) USE OF OPIATE ANALGESIC: ICD-10-CM

## 2019-07-08 DIAGNOSIS — M54.5 LOW BACK PAIN: ICD-10-CM

## 2019-07-08 DIAGNOSIS — Z95.1 PRESENCE OF AORTOCORONARY BYPASS GRAFT: ICD-10-CM

## 2019-07-08 DIAGNOSIS — M40.209 UNSPECIFIED KYPHOSIS, SITE UNSPECIFIED: ICD-10-CM

## 2019-07-08 DIAGNOSIS — Z51.5 ENCOUNTER FOR PALLIATIVE CARE: ICD-10-CM

## 2019-07-08 DIAGNOSIS — R06.00 DYSPNEA, UNSPECIFIED: ICD-10-CM

## 2019-07-08 DIAGNOSIS — Z95.1 PRESENCE OF AORTOCORONARY BYPASS GRAFT: Chronic | ICD-10-CM

## 2019-07-08 DIAGNOSIS — E11.22 TYPE 2 DIABETES MELLITUS WITH DIABETIC CHRONIC KIDNEY DISEASE: ICD-10-CM

## 2019-07-08 DIAGNOSIS — I25.10 ATHEROSCLEROTIC HEART DISEASE OF NATIVE CORONARY ARTERY WITHOUT ANGINA PECTORIS: ICD-10-CM

## 2019-07-08 LAB
ADD ON TEST-SPECIMEN IN LAB: SIGNIFICANT CHANGE UP
ALBUMIN SERPL ELPH-MCNC: 2.8 G/DL — LOW (ref 3.3–5)
ALP SERPL-CCNC: 74 U/L — SIGNIFICANT CHANGE UP (ref 40–120)
ALT FLD-CCNC: 17 U/L — SIGNIFICANT CHANGE UP (ref 12–78)
ANION GAP SERPL CALC-SCNC: 10 MMOL/L — SIGNIFICANT CHANGE UP (ref 5–17)
APTT BLD: 73.2 SEC — HIGH (ref 27.5–36.3)
AST SERPL-CCNC: 33 U/L — SIGNIFICANT CHANGE UP (ref 15–37)
BILIRUB SERPL-MCNC: 2.2 MG/DL — HIGH (ref 0.2–1.2)
BUN SERPL-MCNC: 39 MG/DL — HIGH (ref 7–23)
CALCIUM SERPL-MCNC: 9.4 MG/DL — SIGNIFICANT CHANGE UP (ref 8.5–10.1)
CHLORIDE SERPL-SCNC: 96 MMOL/L — SIGNIFICANT CHANGE UP (ref 96–108)
CO2 SERPL-SCNC: 28 MMOL/L — SIGNIFICANT CHANGE UP (ref 22–31)
CREAT SERPL-MCNC: 1.67 MG/DL — HIGH (ref 0.5–1.3)
GLUCOSE SERPL-MCNC: 57 MG/DL — LOW (ref 70–99)
HCT VFR BLD CALC: 34.2 % — LOW (ref 39–50)
HGB BLD-MCNC: 11.4 G/DL — LOW (ref 13–17)
INR BLD: 6.02 RATIO — CRITICAL HIGH (ref 0.88–1.16)
MAGNESIUM SERPL-MCNC: 2.2 MG/DL — SIGNIFICANT CHANGE UP (ref 1.6–2.6)
MCHC RBC-ENTMCNC: 33.3 GM/DL — SIGNIFICANT CHANGE UP (ref 32–36)
MCHC RBC-ENTMCNC: 33.9 PG — SIGNIFICANT CHANGE UP (ref 27–34)
MCV RBC AUTO: 101.8 FL — HIGH (ref 80–100)
NT-PROBNP SERPL-SCNC: HIGH PG/ML (ref 0–450)
PLATELET # BLD AUTO: 163 K/UL — SIGNIFICANT CHANGE UP (ref 150–400)
POTASSIUM SERPL-MCNC: 4.4 MMOL/L — SIGNIFICANT CHANGE UP (ref 3.5–5.3)
POTASSIUM SERPL-SCNC: 4.4 MMOL/L — SIGNIFICANT CHANGE UP (ref 3.5–5.3)
PROT SERPL-MCNC: 6.3 GM/DL — SIGNIFICANT CHANGE UP (ref 6–8.3)
PROTHROM AB SERPL-ACNC: 70.6 SEC — HIGH (ref 10–12.9)
RBC # BLD: 3.36 M/UL — LOW (ref 4.2–5.8)
RBC # FLD: 13.5 % — SIGNIFICANT CHANGE UP (ref 10.3–14.5)
SODIUM SERPL-SCNC: 134 MMOL/L — LOW (ref 135–145)
TROPONIN I SERPL-MCNC: 0.13 NG/ML — HIGH (ref 0.01–0.04)
TROPONIN I SERPL-MCNC: 0.16 NG/ML — HIGH (ref 0.01–0.04)
WBC # BLD: 12.62 K/UL — HIGH (ref 3.8–10.5)
WBC # FLD AUTO: 12.62 K/UL — HIGH (ref 3.8–10.5)

## 2019-07-08 PROCEDURE — 93010 ELECTROCARDIOGRAM REPORT: CPT

## 2019-07-08 PROCEDURE — 99285 EMERGENCY DEPT VISIT HI MDM: CPT

## 2019-07-08 PROCEDURE — 71045 X-RAY EXAM CHEST 1 VIEW: CPT | Mod: 26

## 2019-07-08 RX ORDER — LEVOTHYROXINE SODIUM 125 MCG
1 TABLET ORAL
Qty: 0 | Refills: 0 | DISCHARGE

## 2019-07-08 RX ORDER — FUROSEMIDE 40 MG
1 TABLET ORAL
Qty: 0 | Refills: 0 | DISCHARGE

## 2019-07-08 RX ORDER — MULTIVIT-MIN/FERROUS GLUCONATE 9 MG/15 ML
1 LIQUID (ML) ORAL
Qty: 0 | Refills: 0 | DISCHARGE

## 2019-07-08 RX ORDER — DIGOXIN 250 MCG
1 TABLET ORAL
Qty: 0 | Refills: 0 | DISCHARGE

## 2019-07-08 RX ORDER — ONDANSETRON 8 MG/1
4 TABLET, FILM COATED ORAL EVERY 6 HOURS
Refills: 0 | Status: DISCONTINUED | OUTPATIENT
Start: 2019-07-08 | End: 2019-07-10

## 2019-07-08 RX ORDER — OXYCODONE HYDROCHLORIDE 5 MG/1
5 TABLET ORAL ONCE
Refills: 0 | Status: DISCONTINUED | OUTPATIENT
Start: 2019-07-08 | End: 2019-07-08

## 2019-07-08 RX ORDER — SIMVASTATIN 20 MG/1
40 TABLET, FILM COATED ORAL AT BEDTIME
Refills: 0 | Status: DISCONTINUED | OUTPATIENT
Start: 2019-07-08 | End: 2019-07-10

## 2019-07-08 RX ORDER — METOPROLOL TARTRATE 50 MG
50 TABLET ORAL DAILY
Refills: 0 | Status: DISCONTINUED | OUTPATIENT
Start: 2019-07-08 | End: 2019-07-10

## 2019-07-08 RX ORDER — FUROSEMIDE 40 MG
40 TABLET ORAL
Refills: 0 | Status: DISCONTINUED | OUTPATIENT
Start: 2019-07-08 | End: 2019-07-10

## 2019-07-08 RX ORDER — OXYCODONE HYDROCHLORIDE 5 MG/1
1 TABLET ORAL
Qty: 0 | Refills: 0 | DISCHARGE

## 2019-07-08 RX ORDER — METOPROLOL TARTRATE 50 MG
1 TABLET ORAL
Qty: 0 | Refills: 0 | DISCHARGE

## 2019-07-08 RX ORDER — ACETAMINOPHEN 500 MG
650 TABLET ORAL EVERY 6 HOURS
Refills: 0 | Status: DISCONTINUED | OUTPATIENT
Start: 2019-07-08 | End: 2019-07-10

## 2019-07-08 RX ORDER — DILTIAZEM HCL 120 MG
1 CAPSULE, EXT RELEASE 24 HR ORAL
Qty: 0 | Refills: 0 | DISCHARGE

## 2019-07-08 RX ORDER — FUROSEMIDE 40 MG
80 TABLET ORAL ONCE
Refills: 0 | Status: COMPLETED | OUTPATIENT
Start: 2019-07-08 | End: 2019-07-08

## 2019-07-08 RX ORDER — WARFARIN SODIUM 2.5 MG/1
1 TABLET ORAL
Qty: 0 | Refills: 0 | DISCHARGE

## 2019-07-08 RX ORDER — SIMVASTATIN 20 MG/1
1 TABLET, FILM COATED ORAL
Qty: 0 | Refills: 0 | DISCHARGE

## 2019-07-08 RX ORDER — LEVOTHYROXINE SODIUM 125 MCG
50 TABLET ORAL DAILY
Refills: 0 | Status: DISCONTINUED | OUTPATIENT
Start: 2019-07-08 | End: 2019-07-10

## 2019-07-08 RX ORDER — ASCORBIC ACID 60 MG
1 TABLET,CHEWABLE ORAL
Qty: 0 | Refills: 0 | DISCHARGE

## 2019-07-08 RX ORDER — POTASSIUM CHLORIDE 20 MEQ
0 PACKET (EA) ORAL
Qty: 0 | Refills: 0 | DISCHARGE

## 2019-07-08 RX ADMIN — Medication 80 MILLIGRAM(S): at 15:00

## 2019-07-08 RX ADMIN — Medication 40 MILLIGRAM(S): at 19:04

## 2019-07-08 RX ADMIN — OXYCODONE HYDROCHLORIDE 5 MILLIGRAM(S): 5 TABLET ORAL at 15:35

## 2019-07-08 RX ADMIN — OXYCODONE HYDROCHLORIDE 5 MILLIGRAM(S): 5 TABLET ORAL at 16:40

## 2019-07-08 NOTE — ED ADULT NURSE NOTE - OBJECTIVE STATEMENT
A&Ox3, patient comes in with SOB. patient placed on bipap by EMS. Patient denies cp/fevers. patient poor historian, waiting for wife to arrive. patient placed on cardiac monitor.

## 2019-07-08 NOTE — ED ADULT TRIAGE NOTE - CHIEF COMPLAINT QUOTE
Pt BIBA from home complaining of difficulty breathing.  CPAP placed by EMS en route for pulse ox of 83% on room air.  Recent hospitalization for back pain.

## 2019-07-08 NOTE — PROVIDER CONTACT NOTE (OTHER) - SITUATION
notified service; spoke to Yandel notified service; spoke to Yandel- Dr. Merino does not come to the hospital.. spoke to CLAYTON Garcia- will discuss with hospitalist decide on a different nephrologist

## 2019-07-08 NOTE — H&P ADULT - NSICDXPASTMEDICALHX_GEN_ALL_CORE_FT
PAST MEDICAL HISTORY:  Afib     Coronary artery disease     Diabetes mellitus     Hyperlipidemia     Hypertension     Pacemaker

## 2019-07-08 NOTE — ED ADULT NURSE NOTE - NSIMPLEMENTINTERV_GEN_ALL_ED
Implemented All Fall with Harm Risk Interventions:  [place] to call system. Call bell, personal items and telephone within reach. Instruct patient to call for assistance. Room bathroom lighting operational. Non-slip footwear when patient is off stretcher. Physically safe environment: no spills, clutter or unnecessary equipment. Stretcher in lowest position, wheels locked, appropriate side rails in place. Provide visual cue, wrist band, yellow gown, etc. Monitor gait and stability. Monitor for mental status changes and reorient to person, place, and time. Review medications for side effects contributing to fall risk. Reinforce activity limits and safety measures with patient and family. Provide visual clues: red socks.

## 2019-07-08 NOTE — H&P ADULT - ASSESSMENT
1) Respiratory distress with hypoxia secondary to volume overload in setting of acute on chronic heart failure.  -Admit to CICU  -Supplemental O2/BIPAP with plan for wean, pt failed wean in ED   -Cardiology consult appreciated, recs implemented  -trend troponin  -rpt BnP in 48hrs  -strict i/o's  -EKG prn chest pain, asymptomatic at this time  -daily weight  -condom catheter for strict i/o's   -DASH/TLC diet with fluid restriction to 1L  -Lasix 40mg IV bid, 80mg given in ED  -Mg>2, K>4  -fall and aspiration precations    2) Demand Ischemia  -f/u TTE  -continue to trend  -pt already anticoagulated    3) Supratherapeutic INR  -no active bleeding  -monitor, f/u rpt in AM    4) STUART on CKD II  -baseline renal function obtained from previous visits  -in setting of aggressive diuresis, Nephrology consult placed    5) Hypoalbuminemia concerning for protein calorie malnutrition  -prealbumin ordered  -nutrition consult  -Ensure TID   -modified diet ordered as wife states patient has a h/o dysphagia    6) Chronic back pain in setting of fall  -pt noted to have multiple rib fractures which date to January as per wife  -Bone scan performed as outpt, pending results  -as per wife, previous scans showed 'nothing that should be causing such severe pain'  -will give tylenol prn pain, tried tramadol/steroids at home but self d/c'd due to stomach discomfort  -would avoid oxycodone in pt who already has h/o falls and visibly became altered, trying to get out of bed, and confused/not answering questions appropriately, with a decrease in O2 saturation from low 90's to high 80's s/p 5mg in ED  -fall precautions  -PT evaluation

## 2019-07-08 NOTE — ED PROVIDER NOTE - OBJECTIVE STATEMENT
89 y/o M with a PMHx of Afib s/p PPM, CAD, DM, HTN, HLD presenting to the ED BIBEMS c/o difficulty breathing beginning this morning. Per EMS, pt had a pulse ox of 83% on room air so they placed pt on CPAP. Pt was recently in [facility] 3 days ago for back pain and was going to be admitted but pt then left AMA. While in ED pt states that he feels better and like he "can breathe." Denies N/V, or CP. PMD: Dr. La. 89 y/o M with a PMHx of Afib s/p PPM, CAD, DM, HTN, HLD presenting to the ED BIBEMS c/o difficulty breathing beginning this morning. Per EMS, pt had a pulse ox of 83% on room air so they placed pt on CPAP. Pt was recently in HHED 3 days ago for back pain and was going to be admitted but pt then left AMA. While in ED pt states that he feels better and like he "can finally breathe." Denies N/V, or CP. PMD: Dr. La.

## 2019-07-08 NOTE — H&P ADULT - NEUROLOGICAL DETAILS
sensation intact/cranial nerves intact/strength decreased/responds to verbal commands/responds to pain

## 2019-07-08 NOTE — CONSULT NOTE ADULT - SUBJECTIVE AND OBJECTIVE BOX
Patient is a 89y old  Male who presents with a chief complaint of shortness of breath.    HPI: 89 year old male presents with acute shortness of breath on the morning of admission. He denies chest pain, fever cough. Prior CABG  1980s, long term persistnet atrial fibrillation, DM type 2, DDD PPM 2012, No prio history of heart failure.  For about 1 month has had severe low back pain being evaluated by ortho spine Dr. Mireles. Had CT of spine and bone scan (result pending from [facility] and the [facility]). Pain is 10/10 and has been on oxycodone 5 mg bid prn. Sine having pain he has not been eating much. On 7/5 came to ED with back pain and was treated with IV fluids for dehydration and he signed out AMA due to the long wait in the ED where he felt cold and uncomfortable. INR was supratherapuetic at that time. His last dose of warfarin was 7/1/19 per his spouse.     Outpatient meds: metoprolol succinate 50 mg qd, diltiazem  mg qd, furosemide 0 mg qd, simvastatin 40 mg qd, glipizide ER 2.5 mg qd, digoxin 0.125 mg qd, warfarin 5 mg 5x per week, levothyroxine 50 mcg qd, oxycodone 5 mg bid prn      PAST MEDICAL & SURGICAL HISTORY:  Diabetes mellitus  Hyperlipidemia  Hypertension  Coronary artery disease  Pacemaker  Afib  Artificial pacemaker  Stented coronary artery        MEDICATIONS  (STANDING):  furosemide   Injectable 40 milliGRAM(s) IV Push two times a day  multivitamin 1 Tablet(s) Oral daily    MEDICATIONS  (PRN):  acetaminophen   Tablet .. 650 milliGRAM(s) Oral every 6 hours PRN Temp greater or equal to 38C (100.4F), Moderate Pain (4 - 6)  ondansetron Injectable 4 milliGRAM(s) IV Push every 6 hours PRN Nausea      FAMILY HISTORY:  No pertinent family history in first degree relatives      SOCIAL HISTORY:  Tobacco-    no       Alcohol-        no      Illicit drugs-       no       Occupation-              Marital  status-  REVIEW OF SYSTEMPertinent items are noted in HPI.    Vital Signs Last 24 Hrs  T(C): 36.7 (08 Jul 2019 11:46), Max: 36.7 (08 Jul 2019 11:46)  T(F): 98.1 (08 Jul 2019 11:46), Max: 98.1 (08 Jul 2019 11:46)  HR: 71 (08 Jul 2019 19:15) (60 - 91)  BP: 140/56 (08 Jul 2019 19:15) (122/55 - 140/56)  BP(mean): --  RR: 20 (08 Jul 2019 19:15) (19 - 23)  SpO2: 93% (08 Jul 2019 19:15) (92% - 100%)    I&O's Summary    08 Jul 2019 07:01  -  08 Jul 2019 20:02  --------------------------------------------------------  IN: 0 mL / OUT: 0 mL / NET: 0 mL      PHYSICAL EXAM  General Appearance: elderly, frail, no distress  HEENT: PERRL, prior cataract surgery. Dry oral mucosa  Neck: Supple, , no adenopathy, thyroid: not enlarged, no carotid bruit or JVD  Back: kyphotic thorax without tenderness.  Lungs: scant bibasilar crackles  Heart: Regular rate and rhythm, S1, S2 normal, 2/6 systolic murmur LLSB  Abdomen: Soft, non-tender, bowel sounds active , no hepatosplenomegaly  Extremities: 2 + edema both legs. Absent pedal pulse bilaterally  Skin: Skin color, texture normal, no rashes   Neurologic: Alert and oriented X3 ,poor memory. No focal abnormality.        INTERPRETATION OF TELEMETRY:    ECG: Ventricular pacing 74 BPM, atrail fibrillation.         LABS:                          11.4   12.62 )-----------( 163      ( 08 Jul 2019 13:15 )             34.2     07-08    134<L>  |  96  |  39<H>  ----------------------------<  57<L>  4.4   |  28  |  1.67<H>    Ca    9.4      08 Jul 2019 13:15  Mg     2.2     07-08    TPro  6.3  /  Alb  2.8<L>  /  TBili  2.2<H>  /  DBili  x   /  AST  33  /  ALT  17  /  AlkPhos  74  07-08    CARDIAC MARKERS ( 08 Jul 2019 18:35 )  0.156 ng/mL / x     / x     / x     / x      CARDIAC MARKERS ( 08 Jul 2019 13:15 )  0.128 ng/mL / x     / x     / x     / x            Pro BNP  [id number] 07-08 @ 13:15  D Dimer  -- 07-08 @ 13:15  Pro BNP  [id number] 07-05 @ 16:26  D Dimer  -- 07-05 @ 16:26    PT/INR - ( 08 Jul 2019 13:15 )   PT: 70.6 sec;   INR: 6.02 ratio         PTT - ( 08 Jul 2019 13:15 )  PTT:73.2 sec          RADIOLOGY & ADDITIONAL STUDIES:  < from: Xray Chest 1 View AP/PA. (07.08.19 @ 13:40) >    EXAM:  XR CHEST 1 VIEW                            PROCEDURE DATE:  07/08/2019          INTERPRETATION:  AP erect chest on July 8, 2019 at 1:24 PM. Patient has   chest pain.    Heart is magnified by technique. Left-sided pacemaker again and   sternotomy again noted.    On July 5 elevated left hemidiaphragm with slight adjacent atelectasis   was seen.    Presently there is an increasing left lower lobe infiltrate. Old left rib   fractures again seen.    There is also an increasing right base infiltrate.    IMPRESSION: Increasing bilateral infiltrates.                TEREAS KRAMER   This document has been electronically signed. Jul 8 2019  3:19PM        < end of copied text >    IMPRESSION:  1. Acute congestive heart failure. Rule out LV systolic dysfunction.  2. CAD, CABG decades ago. Elevated troponin is due to heart failure. He is not having chest pain.   3. Long term persistent  atrial fibrillation with DDD PPM in place. Stable.  4. STUART  5. Low back pain. Etiology to be determined. Needs review of bone scan and CT scan done at [facility].   6.HBP.Stable.  7. DM, type 2.  8. Supratherapeutic INR. Probably due to poor oral intake.   PLAN:  Echocardiogram for LV function.  Agree with IV furosemide 40 mg bid. . Close monitoring of BUN and creatinine. Hold warfarin and follow INR daily.  Continue metoprolol for rate control. Hold diltiazem unless required for rate control or BP control.  Continue simvastatin, levothyroxine. Obtain dig level. Hold digoxin.  Suggest orthopedic consult  for low back pain.   Will follow.

## 2019-07-08 NOTE — H&P ADULT - NSICDXFAMILYHX_GEN_ALL_CORE_FT
FAMILY HISTORY:  Family history of cancer in mother,   FH: CAD (coronary artery disease), in father and mother, both

## 2019-07-08 NOTE — PATIENT PROFILE ADULT - DO YOU FEEL THREATENED BY OTHERS?
CHIEF COMPLAINT:   Postpartum day # 1 s/p (status post) normal spontaneous vaginal delivery    Subjective:  Feeling well, tolerating diet, voiding, ambulating, lochia minimal, mood good  Denies   excessive pain or excessive vaginal bleeding  Is breastfeeding    Objective  Vitals with min/max:      Vital Last Value 24 Hour Range   Temperature 97.3 °F (36.3 °C) (10/06/17 0055) Temp  Min: 97 °F (36.1 °C)  Max: 98.1 °F (36.7 °C)   Pulse (!) 49 (pt reports this as a normal heart rate for her) (10/06/17 0055) Pulse  Min: 49  Max: 90   Respiratory 16 (10/05/17 2103) Resp  Min: 16  Max: 16   Non-Invasive  Blood Pressure 117/55 (10/06/17 0055) BP  Min: 104/60  Max: 130/76   Pulse Oximetry 94 % (10/05/17 1032) SpO2  Min: 94 %  Max: 94 %   Arterial   Blood Pressure   No Data Recorded     General: No apparent distress.  Psychiatric: Alert and oriented x3.  Abdomen:  Soft, non-distended, no rebound tenderness or guarding.  Fundus firm, below the umbilicus.  Extremities:  Calves non-tender.    Labs  A NEGATIVE       Recent Labs  Lab 10/06/17  0550 10/05/17  0630   WBC  --  14.7*   RBC  --  4.33   HGB 10.5* 11.5*   HCT 32.3* 34.7*   PLT  --  267       Assessment  Post-partum day #1 s/p (status post) normal spontaneous vaginal delivery; IOL due to cholestasis of pregnancy    Plan  Continue normal post-partum care  Lactation consultation  Increase ambulation    Therese Smalls MD      
no

## 2019-07-08 NOTE — H&P ADULT - NSICDXPASTSURGICALHX_GEN_ALL_CORE_FT
PAST SURGICAL HISTORY:  Artificial pacemaker     History of quadruple bypass     Stented coronary artery

## 2019-07-08 NOTE — H&P ADULT - ATTENDING COMMENTS
20 min spent discussing advanced care planning with patient and wife at bedside. CPR and intubation were discussed. Wife and patient did not want intubation. After discussion regarding prognosis, baseline health, and patient goals of care, pt and wife decided that he would not accept CPR if offered and prefers to pass away naturally without aggressive measures. Pt accepts pressors, IVF, PO meds, IV abx.

## 2019-07-08 NOTE — H&P ADULT - HISTORY OF PRESENT ILLNESS
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 Pt again presents with wife to Ed due to worsening shortness of breath associated with increasing weakness, decreased appetite, and inability to lie flat. Pt wife states she has no help at home as her son works and is not there often and as of this last week she has been needing to clean him as he is too weak to even walk his usual ten steps to the bathroom. Pt is a poor historian and is in mild respiratory distress at time of interview and is s/p Oxycodone administration therefore cannot obtain Review of systems and history taken from wife.  Wife reports pt has not taken coumadin in about one week and has not been able to follow up with PMD in office due to holiday weekend. Wife denies any dietary indiscretions, medication changes other than coumadin being held, productive cough, travel, leg swelling, calf pain, fever, diarrhea, constipation, urinary changes, reports of HA/cp/nausea.     Wife is very concerned about patient back pain which she describes as being very very severe, not alleviated by lidoderm patch/tramadol/tylenol. Wife states she has been giving patient one oxycodone 5mg daily in AM and sometimes at night. Of note, after administration in ED patient was observed by myself to become altered and confused, reaching for things and trying to get out of bed when prior to that was able to give partial history and was lying comfortably in bed. Pt pain is aggrevated by movement, non radiating, localized to right mid/lateral back, sharp and constant.

## 2019-07-08 NOTE — ED PROVIDER NOTE - PROGRESS NOTE DETAILS
discussed with Dr Miller, pt's cardiologist.  good with BIpap and lasix.  no need for heparin at this time.  will see in hospital

## 2019-07-09 LAB
ALBUMIN SERPL ELPH-MCNC: 2.6 G/DL — LOW (ref 3.3–5)
ALP SERPL-CCNC: 74 U/L — SIGNIFICANT CHANGE UP (ref 40–120)
ALT FLD-CCNC: 15 U/L — SIGNIFICANT CHANGE UP (ref 12–78)
ANION GAP SERPL CALC-SCNC: 10 MMOL/L — SIGNIFICANT CHANGE UP (ref 5–17)
ANISOCYTOSIS BLD QL: SLIGHT — SIGNIFICANT CHANGE UP
APPEARANCE UR: CLEAR — SIGNIFICANT CHANGE UP
APTT BLD: 78.1 SEC — HIGH (ref 27.5–36.3)
AST SERPL-CCNC: 36 U/L — SIGNIFICANT CHANGE UP (ref 15–37)
BASOPHILS # BLD AUTO: 0 K/UL — SIGNIFICANT CHANGE UP (ref 0–0.2)
BASOPHILS NFR BLD AUTO: 0 % — SIGNIFICANT CHANGE UP (ref 0–2)
BILIRUB SERPL-MCNC: 2.7 MG/DL — HIGH (ref 0.2–1.2)
BILIRUB UR-MCNC: NEGATIVE — SIGNIFICANT CHANGE UP
BIZARRE PLATELETS BLD QL SMEAR: PRESENT — SIGNIFICANT CHANGE UP
BUN SERPL-MCNC: 45 MG/DL — HIGH (ref 7–23)
BURR CELLS BLD QL SMEAR: PRESENT — SIGNIFICANT CHANGE UP
CALCIUM SERPL-MCNC: 9.3 MG/DL — SIGNIFICANT CHANGE UP (ref 8.5–10.1)
CHLORIDE SERPL-SCNC: 98 MMOL/L — SIGNIFICANT CHANGE UP (ref 96–108)
CHOLEST SERPL-MCNC: 66 MG/DL — SIGNIFICANT CHANGE UP (ref 10–199)
CO2 SERPL-SCNC: 28 MMOL/L — SIGNIFICANT CHANGE UP (ref 22–31)
COLOR SPEC: YELLOW — SIGNIFICANT CHANGE UP
CREAT SERPL-MCNC: 1.52 MG/DL — HIGH (ref 0.5–1.3)
DIFF PNL FLD: NEGATIVE — SIGNIFICANT CHANGE UP
DIGOXIN SERPL-MCNC: 2.13 NG/ML — HIGH (ref 0.8–2)
ELLIPTOCYTES BLD QL SMEAR: SLIGHT — SIGNIFICANT CHANGE UP
EOSINOPHIL # BLD AUTO: 0 K/UL — SIGNIFICANT CHANGE UP (ref 0–0.5)
EOSINOPHIL NFR BLD AUTO: 0 % — SIGNIFICANT CHANGE UP (ref 0–6)
EPI CELLS # UR: SIGNIFICANT CHANGE UP
GLUCOSE BLDC GLUCOMTR-MCNC: 109 MG/DL — HIGH (ref 70–99)
GLUCOSE BLDC GLUCOMTR-MCNC: 92 MG/DL — SIGNIFICANT CHANGE UP (ref 70–99)
GLUCOSE BLDC GLUCOMTR-MCNC: 93 MG/DL — SIGNIFICANT CHANGE UP (ref 70–99)
GLUCOSE SERPL-MCNC: 40 MG/DL — CRITICAL LOW (ref 70–99)
GLUCOSE UR QL: NEGATIVE MG/DL — SIGNIFICANT CHANGE UP
HBA1C BLD-MCNC: 5.7 % — HIGH (ref 4–5.6)
HCT VFR BLD CALC: 36.5 % — LOW (ref 39–50)
HDLC SERPL-MCNC: 37 MG/DL — LOW
HGB BLD-MCNC: 12 G/DL — LOW (ref 13–17)
HYALINE CASTS # UR AUTO: ABNORMAL /LPF
HYPOCHROMIA BLD QL: SLIGHT — SIGNIFICANT CHANGE UP
INR BLD: 6.03 RATIO — CRITICAL HIGH (ref 0.88–1.16)
KETONES UR-MCNC: NEGATIVE — SIGNIFICANT CHANGE UP
LEUKOCYTE ESTERASE UR-ACNC: NEGATIVE — SIGNIFICANT CHANGE UP
LIPID PNL WITH DIRECT LDL SERPL: 14 MG/DL — SIGNIFICANT CHANGE UP
LYMPHOCYTES # BLD AUTO: 41 % — SIGNIFICANT CHANGE UP (ref 13–44)
LYMPHOCYTES # BLD AUTO: 5.15 K/UL — HIGH (ref 1–3.3)
MACROCYTES BLD QL: SIGNIFICANT CHANGE UP
MAGNESIUM SERPL-MCNC: 2.3 MG/DL — SIGNIFICANT CHANGE UP (ref 1.6–2.6)
MANUAL SMEAR VERIFICATION: SIGNIFICANT CHANGE UP
MCHC RBC-ENTMCNC: 32.9 GM/DL — SIGNIFICANT CHANGE UP (ref 32–36)
MCHC RBC-ENTMCNC: 33.8 PG — SIGNIFICANT CHANGE UP (ref 27–34)
MCV RBC AUTO: 102.8 FL — HIGH (ref 80–100)
MONOCYTES # BLD AUTO: 0.25 K/UL — SIGNIFICANT CHANGE UP (ref 0–0.9)
MONOCYTES NFR BLD AUTO: 2 % — SIGNIFICANT CHANGE UP (ref 2–14)
NEUTROPHILS # BLD AUTO: 7.16 K/UL — SIGNIFICANT CHANGE UP (ref 1.8–7.4)
NEUTROPHILS NFR BLD AUTO: 44 % — SIGNIFICANT CHANGE UP (ref 43–77)
NEUTS BAND # BLD: 13 % — HIGH (ref 0–8)
NITRITE UR-MCNC: NEGATIVE — SIGNIFICANT CHANGE UP
NRBC # BLD: 0 /100 — SIGNIFICANT CHANGE UP (ref 0–0)
NRBC # BLD: SIGNIFICANT CHANGE UP /100 WBCS (ref 0–0)
NT-PROBNP SERPL-SCNC: HIGH PG/ML (ref 0–450)
OB PNL STL: POSITIVE
PH UR: 5 — SIGNIFICANT CHANGE UP (ref 5–8)
PHOSPHATE SERPL-MCNC: 2.5 MG/DL — SIGNIFICANT CHANGE UP (ref 2.5–4.5)
PLAT MORPH BLD: NORMAL — SIGNIFICANT CHANGE UP
PLATELET # BLD AUTO: 168 K/UL — SIGNIFICANT CHANGE UP (ref 150–400)
PLATELET COUNT - ESTIMATE: NORMAL — SIGNIFICANT CHANGE UP
POIKILOCYTOSIS BLD QL AUTO: SLIGHT — SIGNIFICANT CHANGE UP
POTASSIUM SERPL-MCNC: 4 MMOL/L — SIGNIFICANT CHANGE UP (ref 3.5–5.3)
POTASSIUM SERPL-SCNC: 4 MMOL/L — SIGNIFICANT CHANGE UP (ref 3.5–5.3)
PREALB SERPL-MCNC: 3 MG/DL — LOW (ref 20–40)
PROT SERPL-MCNC: 6.3 GM/DL — SIGNIFICANT CHANGE UP (ref 6–8.3)
PROT UR-MCNC: 30 MG/DL
PROTHROM AB SERPL-ACNC: 70.7 SEC — HIGH (ref 10–12.9)
RBC # BLD: 3.55 M/UL — LOW (ref 4.2–5.8)
RBC # FLD: 13.4 % — SIGNIFICANT CHANGE UP (ref 10.3–14.5)
RBC BLD AUTO: ABNORMAL
RBC CASTS # UR COMP ASSIST: ABNORMAL /HPF (ref 0–4)
SODIUM SERPL-SCNC: 136 MMOL/L — SIGNIFICANT CHANGE UP (ref 135–145)
SP GR SPEC: 1.01 — SIGNIFICANT CHANGE UP (ref 1.01–1.02)
TOTAL CHOLESTEROL/HDL RATIO MEASUREMENT: 1.8 RATIO — LOW (ref 3.4–9.6)
TRIGL SERPL-MCNC: 75 MG/DL — SIGNIFICANT CHANGE UP (ref 10–149)
TROPONIN I SERPL-MCNC: 0.12 NG/ML — HIGH (ref 0.01–0.04)
TSH SERPL-MCNC: 1.92 UU/ML — SIGNIFICANT CHANGE UP (ref 0.34–4.82)
UROBILINOGEN FLD QL: 1 MG/DL
WBC # BLD: 12.56 K/UL — HIGH (ref 3.8–10.5)
WBC # FLD AUTO: 12.56 K/UL — HIGH (ref 3.8–10.5)
WBC UR QL: NEGATIVE — SIGNIFICANT CHANGE UP

## 2019-07-09 PROCEDURE — 93306 TTE W/DOPPLER COMPLETE: CPT | Mod: 26

## 2019-07-09 RX ORDER — PHYTONADIONE (VIT K1) 5 MG
2.5 TABLET ORAL ONCE
Refills: 0 | Status: COMPLETED | OUTPATIENT
Start: 2019-07-09 | End: 2019-07-09

## 2019-07-09 RX ORDER — DEXTROSE 50 % IN WATER 50 %
25 SYRINGE (ML) INTRAVENOUS ONCE
Refills: 0 | Status: DISCONTINUED | OUTPATIENT
Start: 2019-07-09 | End: 2019-07-09

## 2019-07-09 RX ORDER — DEXTROSE 50 % IN WATER 50 %
50 SYRINGE (ML) INTRAVENOUS ONCE
Refills: 0 | Status: COMPLETED | OUTPATIENT
Start: 2019-07-09 | End: 2019-07-09

## 2019-07-09 RX ORDER — SODIUM CHLORIDE 9 MG/ML
1000 INJECTION, SOLUTION INTRAVENOUS
Refills: 0 | Status: DISCONTINUED | OUTPATIENT
Start: 2019-07-09 | End: 2019-07-10

## 2019-07-09 RX ORDER — HYDROMORPHONE HYDROCHLORIDE 2 MG/ML
0.2 INJECTION INTRAMUSCULAR; INTRAVENOUS; SUBCUTANEOUS
Refills: 0 | Status: DISCONTINUED | OUTPATIENT
Start: 2019-07-09 | End: 2019-07-10

## 2019-07-09 RX ADMIN — Medication 40 MILLIGRAM(S): at 17:48

## 2019-07-09 RX ADMIN — SODIUM CHLORIDE 30 MILLILITER(S): 9 INJECTION, SOLUTION INTRAVENOUS at 09:45

## 2019-07-09 RX ADMIN — Medication 50 MILLILITER(S): at 08:17

## 2019-07-09 RX ADMIN — Medication 50 MILLIGRAM(S): at 05:47

## 2019-07-09 RX ADMIN — Medication 2.5 MILLIGRAM(S): at 09:46

## 2019-07-09 RX ADMIN — HYDROMORPHONE HYDROCHLORIDE 0.2 MILLIGRAM(S): 2 INJECTION INTRAMUSCULAR; INTRAVENOUS; SUBCUTANEOUS at 13:20

## 2019-07-09 RX ADMIN — Medication 40 MILLIGRAM(S): at 05:47

## 2019-07-09 RX ADMIN — HYDROMORPHONE HYDROCHLORIDE 0.2 MILLIGRAM(S): 2 INJECTION INTRAMUSCULAR; INTRAVENOUS; SUBCUTANEOUS at 17:47

## 2019-07-09 RX ADMIN — Medication 1 MILLIGRAM(S): at 17:48

## 2019-07-09 RX ADMIN — HYDROMORPHONE HYDROCHLORIDE 0.2 MILLIGRAM(S): 2 INJECTION INTRAMUSCULAR; INTRAVENOUS; SUBCUTANEOUS at 16:32

## 2019-07-09 RX ADMIN — Medication 50 MICROGRAM(S): at 05:47

## 2019-07-09 RX ADMIN — HYDROMORPHONE HYDROCHLORIDE 0.2 MILLIGRAM(S): 2 INJECTION INTRAMUSCULAR; INTRAVENOUS; SUBCUTANEOUS at 15:13

## 2019-07-09 NOTE — CHART NOTE - NSCHARTNOTEFT_GEN_A_CORE
Upon Nutritional Assessment by the Registered Dietitian your patient was determined to meet criteria / has evidence of the following diagnosis/diagnoses:          [ ]  Mild Protein Calorie Malnutrition        [ ]  Moderate Protein Calorie Malnutrition        [x] Severe Protein Calorie Malnutrition        [ ] Unspecified Protein Calorie Malnutrition        [ ] Underweight / BMI <19        [ ] Morbid Obesity / BMI > 40      Findings:  severe malnutrition in acute on chronic illness r/t decreased ability to consume sufficient energy/protein 2/2 SOB AEB severe muscle/fat wasting; mild edema.  meeting <50% of ENN x 2 wks    Findings as based on:  •  Comprehensive nutrition assessment and consultation  •  Calorie counts (nutrient intake analysis)  •  Food acceptance and intake status from observations by staff  •  Follow up  •  Patient education  •  Intervention secondary to interdisciplinary rounds  •   concerns      Treatment:    The following diet has been recommended:  1) add gelatein BID to optimize PO intake   2) add MVI with minerals daily to ensure 100% RDI met   3) daily wt checks   4) encourage oral supplements between meals to optimize PO intake    PROVIDER Section:     By signing this assessment you are acknowledging and agree with the diagnosis/diagnoses assigned by the Registered Dietitian    Comments:

## 2019-07-09 NOTE — DIETITIAN INITIAL EVALUATION ADULT. - PHYSICAL APPEARANCE
other (specify) NFPE significant for moderate muscle wasting: temporal, clavicle.  severe muscle wasting; deltoid.  moderate fat wasting; orbital and tricep.  severe fat wasting; rib.

## 2019-07-09 NOTE — PHYSICAL THERAPY INITIAL EVALUATION ADULT - DIAGNOSIS, PT EVAL
Respiratory failure with hypoxia secondary to acute on chronic CHF exacerbation, 50% venti mask, Dysnea, High INR: 6.03, Chronic back pain

## 2019-07-09 NOTE — DIETITIAN INITIAL EVALUATION ADULT. - MALNUTRITION
severe malnutrition in acute on chronic illness r/t decreased ability to consume sufficient energy/protein 2/2 SOB AEB severe muscle/fat wasting; mild edema.  meeting <50% of ENN x 2 wks. severe malnutrition in acute on chronic illness

## 2019-07-09 NOTE — CONSULT NOTE ADULT - SUBJECTIVE AND OBJECTIVE BOX
NEPHROLOGY CONSULT  HPI:  Patient is a 89 year old male with a pmh/o quadruple bypass in , PPM, Chronic atrial fibrillation on coumadin, DMII, chronic back pain s/p fall, who originally presented on  for worsening back pain and left AMA after being told he was dehydrated, given IVF, and notified that his blood work may show infection and that his INR was very high. Pt again presents with wife to Ed due to worsening shortness of breath associated with increasing weakness, decreased appetite, and inability to lie flat. Pt wife states she has no help at home as her son works and is not there often and as of this last week she has been needing to clean him as he is too weak to even walk his usual ten steps to the bathroom. Pt is a poor historian and is in mild respiratory distress at time of interview and is s/p Oxycodone administration therefore cannot obtain Review of systems and history taken from wife.  Wife reports pt has not taken coumadin in about one week and has not been able to follow up with PMD in office due to holiday weekend. Wife denies any dietary indiscretions, medication changes other than coumadin being held, productive cough, travel, leg swelling, calf pain, fever, diarrhea, constipation, urinary changes, reports of HA/cp/nausea.     Wife is very concerned about patient back pain which she describes as being very very severe, not alleviated by lidoderm patch/tramadol/tylenol. Wife states she has been giving patient one oxycodone 5mg daily in AM and sometimes at night. Of note, after administration in ED patient was observed by myself to become altered and confused, reaching for things and trying to get out of bed when prior to that was able to give partial history and was lying comfortably in bed. Pt pain is aggrevated by movement, non radiating, localized to right mid/lateral back, sharp and constant.   Above obtained from chart:  Pt obtunded unable to give history,  information obtained from pts wife and the chart.  No h/o kidney disease  pt has not bben doing well since leaving ED AMA last week.  Pt on IVF, incontinent, bladder scan shows 254 ml  [place], with improvement in the creat since admission      PAST MEDICAL & SURGICAL HISTORY:  Diabetes mellitus  Hyperlipidemia  Hypertension  Coronary artery disease  Pacemaker  Afib  History of quadruple bypass  Artificial pacemaker  Stented coronary artery      FAMILY HISTORY:  FH: CAD (coronary artery disease): in father and mother, both   Family history of cancer in mother:       MEDICATIONS  (STANDING):  dextrose 5% + sodium chloride 0.45%. 1000 milliLiter(s) (30 mL/Hr) IV Continuous <Continuous>  furosemide   Injectable 40 milliGRAM(s) IV Push two times a day  levothyroxine 50 MICROGram(s) Oral daily  metoprolol succinate ER 50 milliGRAM(s) Oral daily  simvastatin 40 milliGRAM(s) Oral at bedtime    MEDICATIONS  (PRN):  acetaminophen   Tablet .. 650 milliGRAM(s) Oral every 6 hours PRN Temp greater or equal to 38C (100.4F), Moderate Pain (4 - 6)  HYDROmorphone  Injectable 0.2 milliGRAM(s) IV Push every 3 hours PRN Severe Pain (7 - 10)dyspnea  ondansetron Injectable 4 milliGRAM(s) IV Push every 6 hours PRN Nausea        Allergies    No Known Allergies    Intolerances        I&O's Detail    2019 07:01  -  2019 07:00  --------------------------------------------------------  IN:  Total IN: 0 mL    OUT:  Total OUT: 0 mL    Total NET: 0 mL            REVIEW OF SYSTEMS:    unable to obtain    Vital Signs Last 24 Hrs  T(C): 36 (2019 15:57), Max: 36.8 (2019 19:30)  T(F): 96.8 (2019 15:57), Max: 98.2 (2019 19:30)  HR: 71 (2019 16:47) (59 - 82)  BP: 107/53 (2019 16:00) (104/46 - 158/52)  BP(mean): 67 (2019 16:00) (56 - 83)  RR: 24 (2019 16:00) (14 - 33)  SpO2: 92% (2019 16:47) (79% - 100%)    PHYSICAL EXAM:    general not communicative, chronically ill appearing, cachectic    Lungs poor air entry  heart RR  abd soft nontender, no guarding  ext R leg > left  chronic as per wife    LABS:                        12.0   12.56 )-----------( 168      ( 2019 06:17 )             36.5         136  |  98  |  45<H>  ----------------------------<  40<LL>  4.0   |  28  |  1.52<H>    Ca    9.3      2019 06:17  Phos  2.5       Mg     2.3         TPro  6.3  /  Alb  2.6<L>  /  TBili  2.7<H>  /  DBili  x   /  AST  36  /  ALT  15  /  AlkPhos  74      PT/INR - ( 2019 06:17 )   PT: 70.7 sec;   INR: 6.03 ratio         PTT - ( 2019 06:17 )  PTT:78.1 sec  Urinalysis Basic - ( 2019 09:30 )    Color: Yellow / Appearance: Clear / S.015 / pH: x  Gluc: x / Ketone: Negative  / Bili: Negative / Urobili: 1 mg/dL   Blood: x / Protein: 30 mg/dL / Nitrite: Negative   Leuk Esterase: Negative / RBC: 3-5 /HPF / WBC Negative   Sq Epi: x / Non Sq Epi: Occasional / Bacteria: x      Magnesium, Serum: 2.3 mg/dL ( @ 06:17)  Phosphorus Level, Serum: 2.5 mg/dL ( @ 06:17)

## 2019-07-09 NOTE — PROGRESS NOTE ADULT - SUBJECTIVE AND OBJECTIVE BOX
Patient is a 89y old  Male who presents with a chief complaint of Respiratory failure with hypoxia secondary to acute on chronic CHF exacerbation (08 Jul 2019 21:14)      HPI:  Patient is a 89 year old male with a pmh/o 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 Pt again presents with wife to Ed due to worsening shortness of breath associated with increasing weakness, decreased appetite, and inability to lie flat. Pt wife states she has no help at home as her son works and is not there often and as of this last week she has been needing to clean him as he is too weak to even walk his usual ten steps to the bathroom. Pt is a poor historian and is in mild respiratory distress at time of interview and is s/p Oxycodone administration therefore cannot obtain Review of systems and history taken from wife.  Wife reports pt has not taken coumadin in about one week and has not been able to follow up with PMD in office due to holiday weekend. Wife denies any dietary indiscretions, medication changes other than coumadin being held, productive cough, travel, leg swelling, calf pain, fever, diarrhea, constipation, urinary changes, reports of HA/cp/nausea.     Wife is very concerned about patient back pain which she describes as being very very severe, not alleviated by lidoderm patch/tramadol/tylenol. Wife states she has been giving patient one oxycodone 5mg daily in AM and sometimes at night. Of note, after administration in ED patient was observed by myself to become altered and confused, reaching for things and trying to get out of bed when prior to that was able to give partial history and was lying comfortably in bed. Pt pain is aggrevated by movement, non radiating, localized to right mid/lateral back, sharp and constant. (08 Jul 2019 21:14)  HPI: 89 year old male presents with acute shortness of breath on the morning of admission. He denies chest pain, fever cough. Prior CABG  1980s, long term persistnet atrial fibrillation, DM type 2, DDD PPM 2012, No prio history of heart failure.  For about 1 month has had severe low back pain being evaluated by ortho spine Dr. Mireles. Had CT of spine and bone scan (result pending from [facility]). Pain is 10/10 and has been on oxycodone 5 mg bid prn. Sine having pain he has not been eating much. On 7/5 came to ED with back pain and was treated with IV fluids for dehydration and he signed out AMA due to the long wait in the ED where he felt cold and uncomfortable. INR was supratherapuetic at that time. His last dose of warfarin was 7/1/19 per his spouse    7/9 patient seen on bipap. not oriented to place. appears comfortable  PAST MEDICAL & SURGICAL HISTORY:  Diabetes mellitus  Hyperlipidemia  Hypertension  Coronary artery disease  Pacemaker  Afib  History of quadruple bypass  Artificial pacemaker  Stented coronary artery        MEDICATIONS  (STANDING):  dextrose 50% Injectable 50 milliLiter(s) IV Push once  furosemide   Injectable 40 milliGRAM(s) IV Push two times a day  levothyroxine 50 MICROGram(s) Oral daily  metoprolol succinate ER 50 milliGRAM(s) Oral daily  phytonadione   Solution 2.5 milliGRAM(s) Oral once  simvastatin 40 milliGRAM(s) Oral at bedtime    MEDICATIONS  (PRN):  acetaminophen   Tablet .. 650 milliGRAM(s) Oral every 6 hours PRN Temp greater or equal to 38C (100.4F), Moderate Pain (4 - 6)  ondansetron Injectable 4 milliGRAM(s) IV Push every 6 hours PRN Nausea          Vital Signs Last 24 Hrs  T(C): 36 (09 Jul 2019 05:38), Max: 36.8 (08 Jul 2019 19:30)  T(F): 96.8 (09 Jul 2019 05:38), Max: 98.2 (08 Jul 2019 19:30)  HR: 62 (09 Jul 2019 07:00) (59 - 91)  BP: 123/48 (09 Jul 2019 07:00) (105/40 - 158/52)  BP(mean): 63 (09 Jul 2019 07:00) (56 - 81)  RR: 15 (09 Jul 2019 07:00) (14 - 29)  SpO2: 100% (09 Jul 2019 07:00) (79% - 100%)    I&O's Summary    08 Jul 2019 07:01  -  09 Jul 2019 07:00  --------------------------------------------------------  IN: 0 mL / OUT: 0 mL / NET: 0 mL        PHYSICAL EXAM  General Appearance: comfortable on bipap  HEENT:   Neck: no jvd  Back:   Lungs: rales bases bilat  Heart: irr s1s2  Abdomen: soft nt  Extremities: no edema  Skin:   Neurologic:       INTERPRETATION OF TELEMETRY:    ECG:        LABS:                          12.0   12.56 )-----------( 168      ( 09 Jul 2019 06:17 )             36.5     07-09    136  |  98  |  45<H>  ----------------------------<  40<LL>  4.0   |  28  |  1.52<H>    Ca    9.3      09 Jul 2019 06:17  Phos  2.5     07-09  Mg     2.3     07-09    TPro  6.3  /  Alb  2.6<L>  /  TBili  2.7<H>  /  DBili  x   /  AST  36  /  ALT  15  /  AlkPhos  74  07-09    CARDIAC MARKERS ( 09 Jul 2019 06:17 )  0.124 ng/mL / x     / x     / x     / x      CARDIAC MARKERS ( 08 Jul 2019 18:35 )  0.156 ng/mL / x     / x     / x     / x      CARDIAC MARKERS ( 08 Jul 2019 13:15 )  0.128 ng/mL / x     / x     / x     / x            Pro BNP  [id number] 07-09 @ 06:17  D Dimer  -- 07-09 @ 06:17  Pro BNP  [id number] 07-08 @ 13:15  D Dimer  -- 07-08 @ 13:15  Pro BNP  [id number] 07-05 @ 16:26  D Dimer  -- 07-05 @ 16:26    PT/INR - ( 09 Jul 2019 06:17 )   PT: 70.7 sec;   INR: 6.03 ratio         PTT - ( 09 Jul 2019 06:17 )  PTT:78.1 sec          RADIOLOGY & ADDITIONAL STUDIES:

## 2019-07-09 NOTE — DIETITIAN INITIAL EVALUATION ADULT. - ADD RECOMMEND
1) add gelatein BID to optimize PO intake 2) add MVI with minerals daily to ensure 100% RDI met 3) daily wt checks 4) encourage oral supplements between meals to optimize PO intake

## 2019-07-09 NOTE — PHYSICAL THERAPY INITIAL EVALUATION ADULT - FOLLOWS COMMANDS/ANSWERS QUESTIONS, REHAB EVAL
unable to answer questions/able to follow single-step instructions/unable to follow multi-step instructions

## 2019-07-09 NOTE — PROGRESS NOTE ADULT - ASSESSMENT
1. Acute congestive heart failure. Rule out LV systolic dysfunction.  2. CAD, CABG decades ago. Elevated troponin is due to heart failure. He is not having chest pain.   3. Long term persistent  atrial fibrillation with DDD PPM in place. Stable.  4. STUART  5. Low back pain. Etiology to be determined. Needs review of bone scan and CT scan done at [facility].   6.HBP.Stable.  7. DM, type 2.  8. Supratherapeutic INR. Probably due to poor oral intake.   PLAN:  Echocardiogram for LV function.   cont  with IV furosemide 40 mg bid. . Close monitoring of BUN and creatinine. Hold warfarin and follow INR daily.  Continue metoprolol for rate control. Hold diltiazem unless required for rate control or BP control.  Continue simvastatin, levothyroxine. Obtain dig level. Hold digoxin.  No acei or arb until cr stabilizes

## 2019-07-09 NOTE — DIETITIAN INITIAL EVALUATION ADULT. - PERTINENT MEDS FT
MEDICATIONS  (STANDING):  dextrose 5% + sodium chloride 0.45%. 1000 milliLiter(s) (30 mL/Hr) IV Continuous <Continuous>  furosemide   Injectable 40 milliGRAM(s) IV Push two times a day  levothyroxine 50 MICROGram(s) Oral daily  metoprolol succinate ER 50 milliGRAM(s) Oral daily  simvastatin 40 milliGRAM(s) Oral at bedtime    MEDICATIONS  (PRN):  acetaminophen   Tablet .. 650 milliGRAM(s) Oral every 6 hours PRN Temp greater or equal to 38C (100.4F), Moderate Pain (4 - 6)  HYDROmorphone  Injectable 0.2 milliGRAM(s) IV Push every 3 hours PRN Severe Pain (7 - 10)dyspnea  ondansetron Injectable 4 milliGRAM(s) IV Push every 6 hours PRN Nausea

## 2019-07-09 NOTE — CHART NOTE - NSCHARTNOTEFT_GEN_A_CORE
This SW left message for pts wife Gayatri [phone] to schedule a family meeting. Awaiting a call back. Our team will continue to follow.

## 2019-07-09 NOTE — PHYSICAL THERAPY INITIAL EVALUATION ADULT - PERTINENT HX OF CURRENT PROBLEM, REHAB EVAL
Pt presents with wife to ED due to worsening shortness of breath associated with increasing weakness, decreased appetite, and inability to lie flat.,pt originally presented on 7/5 for worsening back pain and left AMA after being told he was dehydrated, given IVF, and notified that his blood work may show infection and that his INR was very high.,

## 2019-07-09 NOTE — CONSULT NOTE ADULT - SUBJECTIVE AND OBJECTIVE BOX
HPI: Pt is a 89y old Male with a pmh of quadruple bypass in , PPM, Chronic atrial fibrillation on coumadin, DMII, chronic back pain s/p fall, who originally presented on  for worsening back pain and left AMA after being told he was dehydrated, given IVF, and notified that his blood work may show infection and that his INR was very high. Pt again presents with wife to ED due to worsening shortness of breath associated with increasing weakness, decreased appetite, and inability to lie flat. Pt is a poor historian and is in mild respiratory distress. Palliative medicine Consult to establish GO  19 Seen and examined at bedside with no family present. Pt denies back pain at present however does endorse mild-mod dyspnea with venti mask.     PAIN: (X )Yes   ( )No  As per EMR severe back pain over the past weeks-months  As per wife pain is aggregated by movement, non radiating, localized to right mid/lateral back, sharp and constant.       Pt unable to quantify  DYSPNEA: (X ) Yes  ( ) No  Level: mild-mod at rest    PAST MEDICAL & SURGICAL HISTORY:  Diabetes mellitus  Hyperlipidemia  Hypertension  Coronary artery disease  Pacemaker  Afib  History of quadruple bypass  Artificial pacemaker  Stented coronary artery      SOCIAL HX:  Lives home with wife  Hx opiate tolerance ( )YES  ( X)NO  Recently on Oxycodone 5 mg PO PRN pain    Baseline ADLs  (Prior to Admission)  (X ) Independent   ( )Dependent    FAMILY HISTORY:  FH: CAD (coronary artery disease): in father and mother, both   Family history of cancer in mother:       Review of Systems:    Unable to obtain/Limited due to: poor historian/confusion      PHYSICAL EXAM:    Vital Signs Last 24 Hrs  T(C): 36 (2019 05:38), Max: 36.8 (2019 19:30)  T(F): 96.8 (2019 05:38), Max: 98.2 (2019 19:30)  HR: 71 (2019 10:00) (59 - 91)  BP: 104/78 (2019 10:00) (104/78 - 158/52)  BP(mean): 83 (2019 10:00) (56 - 83)  RR: 24 (2019 10:00) (14 - 29)  SpO2: 92% (2019 10:00) (79% - 100%)    PPSV2:  30-40 %      General: Frail elderly male in bed in mild distress  Mental Status: Alert  and oriented to name  HEENT: VM in place/oral mucosa dry  Lungs: clear to auscultation raven  Cardiac: S1S2+  GI: abd soft NT ND + BS  : voids  Ext: BLE edema  Neuro: confusion      LABS:                        12.0   12.56 )-----------( 168      ( 2019 06:17 )             36.5         136  |  98  |  45<H>  ----------------------------<  40<LL>  4.0   |  28  |  1.52<H>    Ca    9.3      2019 06:17  Phos  2.5       Mg     2.3         TPro  6.3  /  Alb  2.6<L>  /  TBili  2.7<H>  /  DBili  x   /  AST  36  /  ALT  15  /  AlkPhos  74      PT/INR - ( 2019 06:17 )   PT: 70.7 sec;   INR: 6.03 ratio         PTT - ( 2019 06:17 )  PTT:78.1 sec  Albumin: Albumin, Serum: 2.6 g/dL ( @ 06:17)      Allergies    No Known Allergies    Intolerances      MEDICATIONS  (STANDING):  dextrose 5% + sodium chloride 0.45%. 1000 milliLiter(s) (30 mL/Hr) IV Continuous <Continuous>  furosemide   Injectable 40 milliGRAM(s) IV Push two times a day  levothyroxine 50 MICROGram(s) Oral daily  metoprolol succinate ER 50 milliGRAM(s) Oral daily  simvastatin 40 milliGRAM(s) Oral at bedtime    MEDICATIONS  (PRN):  acetaminophen   Tablet .. 650 milliGRAM(s) Oral every 6 hours PRN Temp greater or equal to 38C (100.4F), Moderate Pain (4 - 6)  HYDROmorphone  Injectable 0.2 milliGRAM(s) IV Push every 3 hours PRN Severe Pain (7 - 10)dyspnea  ondansetron Injectable 4 milliGRAM(s) IV Push every 6 hours PRN Nausea      RADIOLOGY/ADDITIONAL STUDIES:  < from: Xray Chest 1 View AP/PA. (19 @ 13:40) >    EXAM:  XR CHEST 1 VIEW                            PROCEDURE DATE:  2019          INTERPRETATION:  AP erect chest on 2019 at 1:24 PM. Patient has   chest pain.    Heart is magnified by technique. Left-sided pacemaker again and   sternotomy again noted.    On  elevated left hemidiaphragm with slight adjacent atelectasis   was seen.    Presently there is an increasing left lower lobe infiltrate. Old left rib   fractures again seen.    There is also an increasing right base infiltrate.    IMPRESSION: Increasing bilateral infiltrates.

## 2019-07-09 NOTE — CONSULT NOTE ADULT - ASSESSMENT
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 Pt again presents with wife to Ed due to worsening shortness of breath associated with increasing weakness, decreased appetite, and inability to lie flat. Pt wife states she has no help at home as her son works and is not there often and as of this last week she has been needing to clean him as he is too weak to even walk his usual ten steps to the bathroom. Pt is a poor historian and is in mild respiratory distress at time of interview and is s/p Oxycodone administration therefore cannot obtain Review of systems and history taken from wife.  Wife reports pt has not taken coumadin in about one week and has not been able to follow up with PMD in office due to holiday weekend. Wife denies any dietary indiscretions, medication changes other than coumadin being held, productive cough, travel, leg swelling, calf pain, fever, diarrhea, constipation, urinary changes, reports of HA/cp/nausea.     Wife is very concerned about patient back pain which she describes as being very very severe, not alleviated by lidoderm patch/tramadol/tylenol. Wife states she has been giving patient one oxycodone 5mg daily in AM and sometimes at night. Of note, after administration in ED patient was observed by myself to become altered and confused, reaching for things and trying to get out of bed when prior to that was able to give partial history and was lying comfortably in bed. Pt pain is aggrevated by movement, non radiating, localized to right mid/lateral back, sharp and constant.   Above obtained from chart:  Pt obtunded unable to give history,  information obtained from pts wife and the chart.  No h/o kidney disease  pt has not been doing well since leaving ED AMA last week.    A/P  Chronically ill patient  CHF, CXR w increasing infiltrates  STUART, mild urinary retention  Leukocytosis  Malnutrition  Cont diuresis, monitoring renal function

## 2019-07-09 NOTE — CONSULT NOTE ADULT - ASSESSMENT
HPI: Pt is a 89y old Male with a pmh of 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 Pt again presents with wife to ED due to worsening shortness of breath associated with increasing weakness, decreased appetite, and inability to lie flat. Pt is a poor historian and is in mild respiratory distress. Palliative medicine Consult to establish GOC  7/9/19 Seen and examined at bedside with no family present. Pt denies back pain at present however does endorse mild-mod dyspnea with venti mask.     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

## 2019-07-09 NOTE — DIETITIAN INITIAL EVALUATION ADULT. - PERTINENT LABORATORY DATA
07-09 Na136 mmol/L Glu 40 mg/dL<LL> K+ 4.0 mmol/L Cr  1.52 mg/dL<H> BUN 45 mg/dL<H> Phos 2.5 mg/dL Alb 2.6 g/dL<L> PAB 3 mg/dL<L>

## 2019-07-09 NOTE — PROGRESS NOTE ADULT - SUBJECTIVE AND OBJECTIVE BOX
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 Pt again presents with wife to Ed due to worsening shortness of breath associated with increasing weakness, decreased appetite, and inability to lie flat. Pt wife states she has no help at home as her son works and is not there often and as of this last week she has been needing to clean him as he is too weak to even walk his usual ten steps to the bathroom. Pt is a poor historian and is in mild respiratory distress at time of interview and is s/p Oxycodone administration therefore cannot obtain Review of systems and history taken from wife.  Wife reports pt has not taken coumadin in about one week and has not been able to follow up with PMD in office due to holiday weekend. Wife denies any dietary indiscretions, medication changes other than coumadin being held, productive cough, travel, leg swelling, calf pain, fever, diarrhea, constipation, urinary changes, reports of HA/cp/nausea.   Was adm for chf and was noted  to have evidence of hypoglycemia s      7/9 on bipap,  obtunded, appears comfortable;              PHYSICAL EXAM  General Appearance: comfortable on bipap  HEENT:   Neck: no jvd  Back:   Lungs: rales bases bilat  Heart: irr s1s2  Abdomen: soft nt  Extremities: no edema  Skin: dry  Neurologic: obtunded                                 12.0   12.56 )-----------( 168      ( 09 Jul 2019 06:17 )             36.5     07-09    136  |  98  |  45<H>  ----------------------------<  40<LL>  4.0   |  28  |  1.52<H>    Ca    9.3      09 Jul 2019 06:17  Phos  2.5     07-09  Mg     2.3     07-09    TPro  6.3  /  Alb  2.6<L>  /  TBili  2.7<H>  /  DBili  x   /  AST  36  /  ALT  15  /  AlkPhos  74  07-09    CARDIAC MARKERS ( 09 Jul 2019 06:17 )  0.124 ng/mL / x     / x     / x     / x      CARDIAC MARKERS ( 08 Jul 2019 18:35 )  0.156 ng/mL / x     / x     / x     / x      CARDIAC MARKERS ( 08 Jul 2019 13:15 )  0.128 ng/mL / x     / x     / x     / x Patient is a 89 year old male with a pmh/o 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 Pt again presents with wife to Ed due to worsening shortness of breath associated with increasing weakness, decreased appetite, and inability to lie flat. Pt wife states she has no help at home as her son works and is not there often and as of this last week she has been needing to clean him as he is too weak to even walk his usual ten steps to the bathroom. Pt is a poor historian and is in mild respiratory distress at time of interview and is s/p Oxycodone administration therefore cannot obtain Review of systems and history taken from wife.  Wife reports pt has not taken coumadin in about one week and has not been able to follow up with PMD in office due to holiday weekend. Wife denies any dietary indiscretions, medication changes other than coumadin being held, productive cough, travel, leg swelling, calf pain, fever, diarrhea, constipation, urinary changes, reports of HA/cp/nausea.   Was adm for chf and was noted  to have evidence of hypoglycemia s      7/9 on bipap,  obtunded, appears comfortable;              PHYSICAL EXAM  General Appearance: comfortable on bipap  HEENT:   Neck: no jvd  Back:   Lungs: rales bases bilat  Heart: irr s1s2  Abdomen: soft nt  Extremities: no edema  Skin: dry  Neurologic: confused                  labs reviewed

## 2019-07-09 NOTE — CONSULT NOTE ADULT - REASON FOR ADMISSION
Respiratory failure with hypoxia secondary to acute on chronic CHF exacerbation
Respiratory failure with hypoxia secondary to acute on chronic CHF exacerbation

## 2019-07-09 NOTE — PHYSICAL THERAPY INITIAL EVALUATION ADULT - CRITERIA FOR SKILLED THERAPEUTIC INTERVENTIONS
rehab potential/therapy frequency/anticipated discharge recommendation/impairments found/functional limitations in following categories/predicted duration of therapy intervention/anticipated equipment needs at discharge/risk reduction/prevention

## 2019-07-09 NOTE — PROGRESS NOTE ADULT - ASSESSMENT
Acute congestive heart failure. Rule out LV systolic dysfunction. Acute hypoxic resp. failure   CAD, CABG    Long term persistent  atrial fibrillation with DDD PPM in place.    STUART improving   hypoglycemia   Supratherapeutic INR. Probably due to poor oral intake.       PLAN:  - c/w bipap and diuretics  - needs pall. eval   - vit k po  - poor prognosis

## 2019-07-09 NOTE — PHYSICAL THERAPY INITIAL EVALUATION ADULT - IMPAIRMENTS FOUND, PT EVAL
gross motor/cognitive impairment/gait, locomotion, and balance/muscle strength/aerobic capacity/endurance/arousal, attention, and cognition/ventilation and respiration/gas exchange

## 2019-07-09 NOTE — DIETITIAN INITIAL EVALUATION ADULT. - OTHER INFO
89yo male p/w SOB associated with increasing weakness, decreased appetite, and inability to lie flat.  Pt admitted for b/l pleural effusions.  pt is poor historian.  Palliative care is following pt.  Pt is DNR/DNI.  Pt appears weak during visit; wife provided information.  (+) mild Rt/Lt leg edema.  no BM since admission.  etta score of 11, no PU noted.  pt meets criteria for severe malnutrition (mild edema. severe muscle/fat wasting, meeting <50% of estimated nutr needs x 2 wks).  see recommendations and NFPE below.

## 2019-07-10 VITALS — TEMPERATURE: 96 F

## 2019-07-10 LAB
GLUCOSE BLDC GLUCOMTR-MCNC: 129 MG/DL — HIGH (ref 70–99)
GLUCOSE BLDC GLUCOMTR-MCNC: 30 MG/DL — CRITICAL LOW (ref 70–99)
GLUCOSE BLDC GLUCOMTR-MCNC: 32 MG/DL — CRITICAL LOW (ref 70–99)
GLUCOSE BLDC GLUCOMTR-MCNC: 33 MG/DL — CRITICAL LOW (ref 70–99)
GLUCOSE BLDC GLUCOMTR-MCNC: 74 MG/DL — SIGNIFICANT CHANGE UP (ref 70–99)
INR BLD: 2.5 RATIO — HIGH (ref 0.88–1.16)
PROTHROM AB SERPL-ACNC: 28.5 SEC — HIGH (ref 10–12.9)

## 2019-07-10 RX ORDER — DEXTROSE 50 % IN WATER 50 %
25 SYRINGE (ML) INTRAVENOUS ONCE
Refills: 0 | Status: DISCONTINUED | OUTPATIENT
Start: 2019-07-10 | End: 2019-07-10

## 2019-07-10 RX ORDER — DEXTROSE 50 % IN WATER 50 %
50 SYRINGE (ML) INTRAVENOUS ONCE
Refills: 0 | Status: COMPLETED | OUTPATIENT
Start: 2019-07-10 | End: 2019-07-10

## 2019-07-10 RX ORDER — SODIUM CHLORIDE 9 MG/ML
1000 INJECTION, SOLUTION INTRAVENOUS
Refills: 0 | Status: DISCONTINUED | OUTPATIENT
Start: 2019-07-10 | End: 2019-07-10

## 2019-07-10 RX ORDER — DEXTROSE 50 % IN WATER 50 %
15 SYRINGE (ML) INTRAVENOUS ONCE
Refills: 0 | Status: DISCONTINUED | OUTPATIENT
Start: 2019-07-10 | End: 2019-07-10

## 2019-07-10 RX ORDER — HYDROMORPHONE HYDROCHLORIDE 2 MG/ML
0.2 INJECTION INTRAMUSCULAR; INTRAVENOUS; SUBCUTANEOUS
Refills: 0 | Status: DISCONTINUED | OUTPATIENT
Start: 2019-07-10 | End: 2019-07-10

## 2019-07-10 RX ORDER — GLUCAGON INJECTION, SOLUTION 0.5 MG/.1ML
1 INJECTION, SOLUTION SUBCUTANEOUS ONCE
Refills: 0 | Status: DISCONTINUED | OUTPATIENT
Start: 2019-07-10 | End: 2019-07-10

## 2019-07-10 RX ORDER — DEXTROSE 50 % IN WATER 50 %
12.5 SYRINGE (ML) INTRAVENOUS ONCE
Refills: 0 | Status: DISCONTINUED | OUTPATIENT
Start: 2019-07-10 | End: 2019-07-10

## 2019-07-10 RX ORDER — ROBINUL 0.2 MG/ML
0.2 INJECTION INTRAMUSCULAR; INTRAVENOUS EVERY 6 HOURS
Refills: 0 | Status: DISCONTINUED | OUTPATIENT
Start: 2019-07-10 | End: 2019-07-10

## 2019-07-10 RX ORDER — INSULIN LISPRO 100/ML
VIAL (ML) SUBCUTANEOUS
Refills: 0 | Status: DISCONTINUED | OUTPATIENT
Start: 2019-07-10 | End: 2019-07-10

## 2019-07-10 RX ADMIN — HYDROMORPHONE HYDROCHLORIDE 0.2 MILLIGRAM(S): 2 INJECTION INTRAMUSCULAR; INTRAVENOUS; SUBCUTANEOUS at 12:11

## 2019-07-10 RX ADMIN — HYDROMORPHONE HYDROCHLORIDE 0.2 MILLIGRAM(S): 2 INJECTION INTRAMUSCULAR; INTRAVENOUS; SUBCUTANEOUS at 13:00

## 2019-07-10 RX ADMIN — Medication 50 MILLILITER(S): at 10:40

## 2019-07-10 RX ADMIN — Medication 40 MILLIGRAM(S): at 06:13

## 2019-07-10 RX ADMIN — SODIUM CHLORIDE 60 MILLILITER(S): 9 INJECTION, SOLUTION INTRAVENOUS at 11:03

## 2019-07-10 NOTE — PROGRESS NOTE ADULT - SUBJECTIVE AND OBJECTIVE BOX
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 Pt again presents with wife to Ed due to worsening shortness of breath associated with increasing weakness, decreased appetite, and inability to lie flat. Pt wife states she has no help at home as her son works and is not there often and as of this last week she has been needing to clean him as he is too weak to even walk his usual ten steps to the bathroom. Pt is a poor historian and is in mild respiratory distress at time of interview and is s/p Oxycodone administration therefore cannot obtain Review of systems and history taken from wife.  Wife reports pt has not taken coumadin in about one week and has not been able to follow up with PMD in office due to holiday weekend. Wife denies any dietary indiscretions, medication changes other than coumadin being held, productive cough, travel, leg swelling, calf pain, fever, diarrhea, constipation, urinary changes, reports of HA/cp/nausea.   Was adm for chf and was noted  to have evidence of hypoglycemia s      7/10 on bipap,  obtunded, glucose 30- 1 amp d50 adm              PHYSICAL EXAM  General Appearance: obtunded  HEENT:   Neck: no jvd  Back:   Lungs: rales bases bilat  Heart: irr s1s2  Abdomen: soft nt  Extremities: no edema  Skin: dry  Neurologic: obtunded                                 12.0   12.56 )-----------( 168      ( 09 Jul 2019 06:17 )             36.5     07-09    136  |  98  |  45<H>  ----------------------------<  40<LL>  4.0   |  28  |  1.52<H>    Ca    9.3      09 Jul 2019 06:17  Phos  2.5     07-09  Mg     2.3     07-09    TPro  6.3  /  Alb  2.6<L>  /  TBili  2.7<H>  /  DBili  x   /  AST  36  /  ALT  15  /  AlkPhos  74  07-09    CARDIAC MARKERS ( 09 Jul 2019 06:17 )  0.124 ng/mL / x     / x     / x     / x      CARDIAC MARKERS ( 08 Jul 2019 18:35 )  0.156 ng/mL / x     / x     / x     / x      CARDIAC MARKERS ( 08 Jul 2019 13:15 )  0.128 ng/mL / x     / x     / x     / x

## 2019-07-10 NOTE — PROGRESS NOTE ADULT - ASSESSMENT
HPI: Pt is a 89y old Male with a pmh of 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 Pt again presents with wife to ED due to worsening shortness of breath associated with increasing weakness, decreased appetite, and inability to lie flat. Pt is a poor historian and is in mild respiratory distress. Palliative medicine Consult to establish GOC  7/9/19 Seen and examined at bedside with no family present. Pt denies back pain at present however does endorse mild-mod dyspnea with venti mask.     Assessment and Plan:    1) Dyspnea  -R/T raven pleural effusions  -Heart failure  -S/P CABG  -CXR noted  -Supplemental O2 PRN  -Currently on BiPap  -Dilaudid 0.2 mg IVP Q3H PRN severe pain/dyspnea  Received 2 doses over the past 24 hrs    2) Heart Failure  -Acute on chronic  -TTE pending  -AFib  -PPM  -Cardio eval noted  -pt anticoagulated/ supratherapeutic INR    3) STUART on CKD   -Diuresis as tolerated  -Nephrology consult pending    4) Chronic back pain  -S/P Fall   -pt noted to have multiple rib fractures which date to January as per wife  -Bone scan performed as outpt, pending results  -cont Tylenol prn mild   -Dilaudid 0.2 mg IVP Q3H PRN severe pain/dyspnea  -PT evaluation      5) Advanced Directives  -Pt currently without capacity  -Wife Gayatri named HCP  -STEFANO completed with DNR/DNI  -GOC discussion 7/9/19  -Will follow

## 2019-07-10 NOTE — GOALS OF CARE CONVERSATION - PERSONAL ADVANCE DIRECTIVE - CONVERSATION/DISCUSSION
Prognosis/Diagnosis/MOLST Discussed/Treatment Options
Prognosis/MOLST Discussed/Treatment Options/Diagnosis
MOLST Discussed

## 2019-07-10 NOTE — GOALS OF CARE CONVERSATION - PERSONAL ADVANCE DIRECTIVE - CONVERSATION DETAILS
The role of Palliative medicine was reviewed. The pt's wife discussed her husbands acute decline in functional status over the past month since develop[ing severe back pain on his already chronic low back pain. He has been less mobile, decreased activities as well as decreased PO intake. We discussed options for care following hospitalization including [facility] which she does not feel he would consider as well as home care options including hospice care. We recommended home hospice as the pt is symptomatic with pain and dyspnea and his wife is having difficulty maintaining his comfort. She will further discuss with her sons prior to making a decision.  Pt has completing a DNR/DNI MOLST. 45 minutes spent discussing advanced care planning.
The role of Palliative Medicine was reviewed with the pt's family. We discussed removal of BiPap support as it was causing discomfort and his wife agreed to change to 100% non rebreather. He is no longer arousable and received Dilaudid for increased work of breathing. We discussed transition to hospice care however they are afraid to move him at this time. He will remain on comfort care and transfer to IN. The pt's wife and son's are struggling with decisions however do agree to comfort at this time including Dilaudid as well as removal of BiPap. 46 minutes spent on advanced care planning discussion
Team met with pts wife to discuss goals of care, assist with planning and provide supportive counseling. Pt. confused at times and deferred to pts wife. Pt. was residing at home with his wife (son lives there as well but works). Pts wife reports she is pts primary care taker. She discussed that pt. has gotten weaker and weaker and it has been difficult for him to walk. She also discussed how over the last few weeks pt. has been minimally eating and drinking. She discussed how pt. did not want to come to the hospital but she called 911 due to his shortness of breath.     Pts current medical condition and goals of care discussed. Pts wife discussed how pt. just wants to be home and be comfortable. We discussed possible options including [facility] (if pt. was eligible), and home with home care services. Team reviewed basic, palliative and hospice services. Pts wife expressed that she knows pt wants to come home and she would like to take him home if possible. We reviewed all levels of home care and recommended home hospice services based on pts medical condition and his goals that pts wife has expressed. Team explained hospice services and philosophy in detail. Pts was receptive to the information but slightly overwhelmed as she feels pt. has declined quickly. Pts wife would like to think over options and speak with her two sons and will then decide on plan. Team confirmed DNR/DNI with pts wife. MOLST is on chart.    Plan at this time is to be determined. Pts wife would like to speak with her sons and see if pt. has any improvement in the next day or so before making any decisions. Possible plan is home hospice VS [facility] (if pt. was eligible). Emotional support provided. Our team will continue to follow.

## 2019-07-10 NOTE — PROGRESS NOTE ADULT - SUBJECTIVE AND OBJECTIVE BOX
HPI: Pt is a 89y old Male with a pmh of quadruple bypass in , PPM, Chronic atrial fibrillation on coumadin, DMII, chronic back pain s/p fall, who originally presented on  for worsening back pain and left AMA after being told he was dehydrated, given IVF, and notified that his blood work may show infection and that his INR was very high. Pt again presents with wife to ED due to worsening shortness of breath associated with increasing weakness, decreased appetite, and inability to lie flat. Pt is a poor historian and is in mild respiratory distress. Palliative medicine Consult to establish GOC  19 Seen and examined at bedside with no family present. Pt denies back pain at present however does endorse mild-mod dyspnea with venti mask.   7/10/19 Seen and examined at bedside with no family present. Pt minimally responsive on BiPap support in no distress  PAIN: (X )Yes   ( )No  As per EMR severe back pain over the past weeks-months  As per wife pain is aggregated by movement, non radiating, localized to right mid/lateral back, sharp and constant.       Pt unable to quantify  DYSPNEA: (X ) Yes  ( ) No  Level: mild-mod at rest    PAST MEDICAL & SURGICAL HISTORY:  Diabetes mellitus  Hyperlipidemia  Hypertension  Coronary artery disease  Pacemaker  Afib  History of quadruple bypass  Artificial pacemaker  Stented coronary artery      SOCIAL HX:  Lives home with wife  Hx opiate tolerance ( )YES  ( X)NO  Recently on Oxycodone 5 mg PO PRN pain    Baseline ADLs  (Prior to Admission)  (X ) Independent   ( )Dependent    FAMILY HISTORY:  FH: CAD (coronary artery disease): in father and mother, both   Family history of cancer in mother:       Review of Systems:    Unable to obtain/Limited due to: poor historian/confusion      PHYSICAL EXAM:  ICU Vital Signs Last 24 Hrs  T(C): 36.1 (10 Jul 2019 05:23), Max: 36.8 (2019 23:15)  T(F): 97 (10 Jul 2019 05:23), Max: 98.3 (2019 23:15)  HR: 72 (10 Jul 2019 09:00) (59 - 85)  BP: 101/41 (10 Jul 2019 09:00) (86/37 - 147/66)  BP(mean): 56 (10 Jul 2019 09:00) (49 - 88)  RR: 24 (10 Jul 2019 09:00) (17 - 34)  SpO2: 89% (10 Jul 2019 09:00) (87% - 100%)      PPSV2:  30-40 %      General: Frail elderly male in bed in NAD distress  Mental Status: Minimally rousable  HEENT: BiPap in place/oral mucosa dry  Lungs: clear to auscultation raven  Cardiac: S1S2+  GI: abd soft NT ND + BS  : voids  Ext: BLE edema  Neuro: lethargic    LABS:                            12.0   12.56 )-----------( 168      ( 2019 06:17 )             36.5        07-    136  |  98  |  45<H>  ----------------------------<  40<LL>  4.0   |  28  |  1.52<H>    Ca    9.3      2019 06:17  Phos  2.5       Mg     2.3         TPro  6.3  /  Alb  2.6<L>  /  TBili  2.7<H>  /  DBili  x   /  AST  36  /  ALT  15  /  AlkPhos  74  09    PT/INR - ( 2019 06:17 )   PT: 70.7 sec;   INR: 6.03 ratio         PTT - ( 2019 06:17 )  PTT:78.1 sec  Albumin: Albumin, Serum: 2.6 g/dL ( @ 06:17)      Allergies    No Known Allergies    Intolerances      MEDICATIONS  (STANDING):  dextrose 5% + sodium chloride 0.45%. 1000 milliLiter(s) (30 mL/Hr) IV Continuous <Continuous>  furosemide   Injectable 40 milliGRAM(s) IV Push two times a day  levothyroxine 50 MICROGram(s) Oral daily  metoprolol succinate ER 50 milliGRAM(s) Oral daily  simvastatin 40 milliGRAM(s) Oral at bedtime    MEDICATIONS  (PRN):  acetaminophen   Tablet .. 650 milliGRAM(s) Oral every 6 hours PRN Temp greater or equal to 38C (100.4F), Moderate Pain (4 - 6)  HYDROmorphone  Injectable 0.2 milliGRAM(s) IV Push every 3 hours PRN Severe Pain (7 - 10)dyspnea  ondansetron Injectable 4 milliGRAM(s) IV Push every 6 hours PRN Nausea      RADIOLOGY/ADDITIONAL STUDIES:        < from: Xray Chest 1 View AP/PA. (19 @ 13:40) >    EXAM:  XR CHEST 1 VIEW                            PROCEDURE DATE:  2019          INTERPRETATION:  AP erect chest on 2019 at 1:24 PM. Patient has   chest pain.    Heart is magnified by technique. Left-sided pacemaker again and   sternotomy again noted.    On  elevated left hemidiaphragm with slight adjacent atelectasis   was seen.    Presently there is an increasing left lower lobe infiltrate. Old left rib   fractures again seen.    There is also an increasing right base infiltrate.    IMPRESSION: Increasing bilateral infiltrates.

## 2019-07-10 NOTE — PROGRESS NOTE ADULT - ASSESSMENT
Toxic metab. encephalopathy    Acute congestive heart failure. Rule out LV systolic dysfunction.   CAD, CABG    Long term persistent  atrial fibrillation with DDD PPM in place.    STUART improving   DM, type 2 now with hypoglycemia   Supratherapeutic INR. Probably due to poor oral intake. s/p vit K      PLAN:  - 1 amp d50  - bipap should be dc lethargic  - pt should be on hospice the treatment - diuretics, O2 is not helping  - start d5IVF  - d/w wife aware he is at risk for death in the next few hours she is coming

## 2019-07-10 NOTE — PROGRESS NOTE ADULT - REASON FOR ADMISSION
Respiratory failure with hypoxia secondary to acute on chronic CHF exacerbation

## 2019-07-10 NOTE — GOALS OF CARE CONVERSATION - PERSONAL ADVANCE DIRECTIVE - NS PRO AD PATIENT TYPE ON CHART
Medical Orders for Life-Sustaining Treatment (MOLST)/Health Care Proxy (HCP)/Do Not Resuscitate (DNR)
Health Care Proxy (HCP)/Do Not Resuscitate (DNR)/Medical Orders for Life-Sustaining Treatment (MOLST)
Medical Orders for Life-Sustaining Treatment (MOLST)/Do Not Resuscitate (DNR)

## 2019-07-10 NOTE — GOALS OF CARE CONVERSATION - PERSONAL ADVANCE DIRECTIVE - PARTICIPANTS
Staff.../Family.../Pt defers to his wife
pt. confused at times and deferred to his wife/Family.../Staff...
Family.../Staff...

## 2019-07-10 NOTE — GOALS OF CARE CONVERSATION - PERSONAL ADVANCE DIRECTIVE - NS PRO AD PATIENT TYPE
Medical Orders for Life-Sustaining Treatment (MOLST)/Health Care Proxy (HCP)/Do Not Resuscitate (DNR)
Health Care Proxy (HCP)/Do Not Resuscitate (DNR)/Medical Orders for Life-Sustaining Treatment (MOLST)
Medical Orders for Life-Sustaining Treatment (MOLST)/Health Care Proxy (HCP)/Do Not Resuscitate (DNR)

## 2019-07-10 NOTE — PROGRESS NOTE ADULT - SUBJECTIVE AND OBJECTIVE BOX
Patient is a 89y old  Male who presents with a chief complaint of Respiratory failure with hypoxia secondary to acute on chronic CHF exacerbation (2019 18:11)      HPI:  Patient is a 89 year old male with a pmh/o quadruple bypass in , PPM, Chronic atrial fibrillation on coumadin, DMII, chronic back pain s/p fall, who originally presented on  for worsening back pain and left AMA after being told he was dehydrated, given IVF, and notified that his blood work may show infection and that his INR was very high. Pt again presents with wife to Ed due to worsening shortness of breath associated with increasing weakness, decreased appetite, and inability to lie flat. Pt wife states she has no help at home as her son works and is not there often and as of this last week she has been needing to clean him as he is too weak to even walk his usual ten steps to the bathroom. Pt is a poor historian and is in mild respiratory distress at time of interview and is s/p Oxycodone administration therefore cannot obtain Review of systems and history taken from wife.  Wife reports pt has not taken coumadin in about one week and has not been able to follow up with PMD in office due to holiday weekend. Wife denies any dietary indiscretions, medication changes other than coumadin being held, productive cough, travel, leg swelling, calf pain, fever, diarrhea, constipation, urinary changes, reports of HA/cp/nausea.     Wife is very concerned about patient back pain which she describes as being very very severe, not alleviated by lidoderm patch/tramadol/tylenol. Wife states she has been giving patient one oxycodone 5mg daily in AM and sometimes at night. Of note, after administration in ED patient was observed by myself to become altered and confused, reaching for things and trying to get out of bed when prior to that was able to give partial history and was lying comfortably in bed. Pt pain is aggrevated by movement, non radiating, localized to right mid/lateral back, sharp and constant. (2019 21:14)   patient seen on bipap. not oriented to place. appears comfortable  7/10 on bipap, confused and obtunded, appears comfortable    PAST MEDICAL & SURGICAL HISTORY:  Diabetes mellitus  Hyperlipidemia  Hypertension  Coronary artery disease  Pacemaker  Afib  History of quadruple bypass  Artificial pacemaker  Stented coronary artery        MEDICATIONS  (STANDING):  dextrose 5% + sodium chloride 0.45%. 1000 milliLiter(s) (30 mL/Hr) IV Continuous <Continuous>  furosemide   Injectable 40 milliGRAM(s) IV Push two times a day  levothyroxine 50 MICROGram(s) Oral daily  metoprolol succinate ER 50 milliGRAM(s) Oral daily  simvastatin 40 milliGRAM(s) Oral at bedtime    MEDICATIONS  (PRN):  acetaminophen   Tablet .. 650 milliGRAM(s) Oral every 6 hours PRN Temp greater or equal to 38C (100.4F), Moderate Pain (4 - 6)  HYDROmorphone  Injectable 0.2 milliGRAM(s) IV Push every 3 hours PRN Severe Pain (7 - 10)dyspnea  ondansetron Injectable 4 milliGRAM(s) IV Push every 6 hours PRN Nausea          Vital Signs Last 24 Hrs  T(C): 36.1 (10 Jul 2019 05:23), Max: 36.8 (2019 23:15)  T(F): 97 (10 Jul 2019 05:23), Max: 98.3 (2019 23:15)  HR: 69 (10 Jul 2019 08:00) (59 - 85)  BP: 103/44 (10 Jul 2019 08:00) (86/37 - 147/66)  BP(mean): 59 (10 Jul 2019 08:00) (49 - 88)  RR: 25 (10 Jul 2019 08:00) (17 - 34)  SpO2: 90% (10 Jul 2019 08:00) (87% - 100%)    I&O's Summary      PHYSICAL EXAM  General Appearance: comfortable on bipap  HEENT:   Neck: no jvd  Back:   Lungs: rales bases bilat  Heart: irr s1s2  Abdomen: soft nt  Extremities: no edema  Skin:   Neurologic:         INTERPRETATION OF TELEMETRY:    ECG:        LABS:                          12.0   12.56 )-----------( 168      ( 2019 06:17 )             36.5     07-09    136  |  98  |  45<H>  ----------------------------<  40<LL>  4.0   |  28  |  1.52<H>    Ca    9.3      2019 06:17  Phos  2.5     07-09  Mg     2.3     07-09    TPro  6.3  /  Alb  2.6<L>  /  TBili  2.7<H>  /  DBili  x   /  AST  36  /  ALT  15  /  AlkPhos  74  -    CARDIAC MARKERS ( 2019 06:17 )  0.124 ng/mL / x     / x     / x     / x      CARDIAC MARKERS ( 2019 18:35 )  0.156 ng/mL / x     / x     / x     / x      CARDIAC MARKERS ( 2019 13:15 )  0.128 ng/mL / x     / x     / x     / x          Lipid Panel  66  37  14  75    Pro BNP  84103  @ 06:17  D Dimer  --  @ 06:17  Pro BNP  43726  @ 13:15  D Dimer  --  @ 13:15  Pro BNP  [id number]  @ 16:26  D Dimer  --  @ 16:26    PT/INR - ( 2019 06:17 )   PT: 70.7 sec;   INR: 6.03 ratio         PTT - ( 2019 06:17 )  PTT:78.1 sec  Urinalysis Basic - ( 2019 09:30 )    Color: Yellow / Appearance: Clear / S.015 / pH: x  Gluc: x / Ketone: Negative  / Bili: Negative / Urobili: 1 mg/dL   Blood: x / Protein: 30 mg/dL / Nitrite: Negative   Leuk Esterase: Negative / RBC: 3-5 /HPF / WBC Negative   Sq Epi: x / Non Sq Epi: Occasional / Bacteria: x            RADIOLOGY & ADDITIONAL STUDIES:

## 2019-07-10 NOTE — PROGRESS NOTE ADULT - ASSESSMENT
1. Acute congestive heart failure. Rule out LV systolic dysfunction.  2. CAD, CABG decades ago. Elevated troponin is due to heart failure. He is not having chest pain.   3. Long term persistent  atrial fibrillation with DDD PPM in place. Stable.  4. STUART improving  5. Low back pain. Etiology to be determined. Needs review of bone scan and CT scan done at [facility] ([place]   6.HBP.Stable.  7. DM, type 2.  8. Supratherapeutic INR. Probably due to poor oral intake.   PLAN:  Awat Echocardiogram for LV function.  cont metoprolol and lasix, bipap

## 2019-07-15 NOTE — CDI QUERY NOTE - NSCDIOTHERTXTBX_GEN_ALL_CORE_HH
This patient is documented with STUART/acute renal failure.    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  -baseline renal function obtained from previous visits  -in setting of aggressive diuresis, Nephrology consult placed    Hospitalist: STUART improving      DOCUMENTATION:      Creatinine Trend:  Creatinine, Serum: 1.52 mg/dL <H> [0.50 - 1.30] (07-09-19)  Creatinine, Serum: 1.67 mg/dL <H> [0.50 - 1.30] (07-08-19)  Creatinine, Serum: 1.13 mg/dL [0.50 - 1.30] (07-05-19)         [facility] policy based on KDIGO guidelines defines STUART (applicable to both adult and pediatric patients) as any of the following;  •	Increase Creatinine = 0.3 mg/dl from baseline within 48 hours; or  •	Increase in Creatinine level to = 1.5x baseline, which is known or presumed to have occurred within the prior 7 days; or      According to clinical guidelines, clinical criteria must support documented clinical diagnoses.    Can you please clarify the clinical indicators supporting the diagnosis of STUART or clarify that STUART has been ruled out?    Please document a baseline creatinine.

## 2019-07-30 NOTE — CDI QUERY NOTE - NSCDIOTHERTXTBX_GEN_ALL_CORE_HH
Clinical documentation indicates that this patient has CHF.     Cardiology: Acute congestive heart failure. Rule out LV systolic dysfunction.    H&P: Respiratory distress with hypoxia secondary to volume overload in setting of acute on chronic heart failure.    Hosp. Progress notes: Acute congestive heart failure.     Palliative: Heart Failure - -Acute on chronic    BNP 96888    ECHO 55-60%    Lasix 40 mgs IVP       Please include more specific documentation, either known or suspected, of the acuity, type and underlying cause of Heart Failure in your Progress Note and/or Discharge Summary.    Specify ACUITY:  Acute  Chronic  Acute on Chronic  Unspecified     AND    Specify TYPE:  Systolic (HFrEF)  Diastolic (HFpEF)  Combined Systolic (HFrEF)  & Diastolic (HFpEF)  Right Heart Failure  Left Heart Failure  Biventricular Heart Failure (include the systolic and diastolic also)  Other (specify)

## 2019-08-28 ENCOUNTER — APPOINTMENT (OUTPATIENT)
Dept: ELECTROPHYSIOLOGY | Facility: CLINIC | Age: 84
End: 2019-08-28

## 2019-10-22 NOTE — PROGRESS NOTE ADULT - PROVIDER SPECIALTY LIST ADULT
Cardiology Follow up with your primary care doctor in 1-2 days     Croup in Children    WHAT YOU NEED TO KNOW:    Croup is an infection that causes the throat and upper airways of the lungs to swell and narrow. It is also called laryngotracheobronchitis and commonly caused by a virus. Croup makes it harder for your child to breathe. This infection is common in children 5 years or younger, but your child can get croup at any age. He or she may get croup more than one time.    DISCHARGE INSTRUCTIONS:    Call 911 if:     Your child stops breathing or breathing becomes difficult.      Your child faints.       Your child's lips or fingernails turn blue, gray, or white.      The skin between your child's ribs or around his or her neck goes in with every breath.      Your child is dizzy or sleeping more than what is normal for him or her.       Your child drools or has trouble swallowing his or her saliva.     Return to the emergency department if:     Your child has no tears when he or she cries.       The soft spot on the top of your baby's head is sunken in.       Your child has wrinkled skin, cracked lips, or a dry mouth.      Your child urinates less than what is normal for him or her.     Contact your child's healthcare provider if:     Your child has a fever.      Your child does not get better after sitting in a steamy bathroom for 10 to 15 minutes.      Your child's cough does not go away.      You have questions or concerns about your child's condition or care.    Medicines:     Ibuprofen or acetaminophen: These medicines are given to decrease your child's pain and fever. They can be bought without a doctor's order. Ask how much medicine is safe to give your child, and how often to give it.      Cough medicine may help your child's cough. Ask your child's healthcare provider what to give for cough. Most cough medicines cannot be given to children younger than 2 years.      Do not give aspirin to children under 18 years of age. Your child could develop Reye syndrome if he takes aspirin. Reye syndrome can cause life-threatening brain and liver damage. Check your child's medicine labels for aspirin, salicylates, or oil of wintergreen.       Give your child's medicine as directed. Contact your child's healthcare provider if you think the medicine is not working as expected. Tell him or her if your child is allergic to any medicine. Keep a current list of the medicines, vitamins, and herbs your child takes. Include the amounts, and when, how, and why they are taken. Bring the list or the medicines in their containers to follow-up visits. Carry your child's medicine list with you in case of an emergency.    Follow up with your child's healthcare provider as directed: Write down your questions so you remember to ask them during your visits.    Care for your child:     Have your child breathe moist air. Warm, moist air may help your child breathe easier. If your child has symptoms of croup, take him or her into the bathroom. Close the bathroom door, and turn on a hot shower. Do not put your child into the shower. Sit with your child in the warm, moist air for 15 to 20 minutes. If it is cool outside, take your clothed child outside in the cool, moist air for 5 minutes.       Comfort your child. Keep him or her calm. Crying can make his cough worse and breathing more difficult. Have your child rest as much as possible.       Give your child liquids as directed. Offer your child small amounts of room temperature liquids every hour. Ask your child's healthcare provider how much to give your child.       Use a cool mist humidifier in your child's room. This may also make it easier for your child to breathe and help decrease his or her cough.       Do not let others smoke around your child. Smoke can make your child's breathing and coughing worse.    Prevent the spread of croup:     Wash your hands often. Use soap and water. Wash your hands after you use the bathroom, change a child's diapers, or sneeze. Wash your hands before you prepare or eat food.       Do not let your child share cups, forks, spoons, or plates with others.      Keep your child home from school or .          © Copyright [employer] 2019 All illustrations and images included in CareNotes are the copyrighted property of [employer].D.A[employer]M., Inc[employer] or [employer]

## 2020-04-20 NOTE — ED ADULT NURSE REASSESSMENT NOTE - NS ED NURSE REASSESS COMMENT FT1
J-Code: 
denies pain
Patient received from CLAYTON Tellez. Patient resting comfortably in bed. Safety and comfort maintained. Will continue to monitor.

## 2020-07-20 NOTE — ED ADULT NURSE NOTE - NS ED PATIENT SAFETY CONCERN
No Eye Protection Verbiage: Before proceeding with the stage, a plastic scleral shield was inserted. The globe was anesthetized with a few drops of 1% lidocaine with 1:100,000 epinephrine. Then, an appropriate sized scleral shield was chosen and coated with lacrilube ointment. The shield was gently inserted and left in place for the duration of each stage. After the stage was completed, the shield was gently removed.

## 2020-07-27 NOTE — PATIENT PROFILE ADULT. - HAS THE PATIENT USED TOBACCO IN THE PAST 30 DAYS?
Topical Sulfur Applications Counseling: Topical Sulfur Counseling: Patient counseled that this medication may cause skin irritation or allergic reactions.  In the event of skin irritation, the patient was advised to reduce the amount of the drug applied or use it less frequently.   The patient verbalized understanding of the proper use and possible adverse effects of topical sulfur application.  All of the patient's questions and concerns were addressed. No

## 2020-09-07 NOTE — H&P ADULT - BACK
-- DO NOT REPLY / DO NOT REPLY ALL --  -- Message is from the [facility]--    Patient is requesting a medication refill - medication is on active medication list    Patient is currently OUT of the requested medication.    Was Medication Pended?  Yes.    Rx Name and Dose:  zolpidem (AMBIEN) 10 MG tablet    Duration: 30 days    Pharmacy  [id number] - [address] [facility]    Patient confirmed the above pharmacy as correct?  Yes    Caller Information       Type Contact Phone    09/07/2020 01:07 PM CDT Phone (Incoming) Kezia Guerra (Self) [phone] (H)          Alternative phone number: NoNe    Turnaround time given to caller:   \"Your doctor's office is closed. This message will be sent to our nurse. They will return your call as soon as they review your message. (Calls after 10 PM will be returned tomorrow morning.)\"   detailed exam

## 2021-02-22 NOTE — H&P ADULT - GA GEN PE INS DISTRESS LEV PC PC
Taqueria Vela called to speak to you pertaining to the patient  Her call back number is [phone]  Thank you  moderate

## 2021-07-19 NOTE — SWALLOW BEDSIDE ASSESSMENT ADULT - ASR SWALLOW LABIAL MOBILITY
Effort was reduced when attempting to execute volitional labial actions but no overt lip focality was evident. no concerns

## 2022-03-01 NOTE — ED ADULT TRIAGE NOTE - CCCP TRG CHIEF CMPLNT
Hpi Title: Evaluation of Skin Lesions How Severe Are Your Spot(S)?: moderate difficulty breathing Have Your Spot(S) Been Treated In The Past?: has not been treated

## 2022-08-23 NOTE — ED ADULT NURSE NOTE - NEURO ASSESSMENT
Caller: Viry Theodore    Relationship: Self    Best call back number:     What form or medical record are you requesting: [employer] FMLA PAPERWORK AND NOTE STATING HOSPITALIZATION FOR BIRTH    Who is requesting this form or medical record from you: [employer] - [employer]    How would you like to receive the form or medical records (pick-up, mail, fax): SEND VIA [employer]  IF UNABLE TO SEND VIA [employer] PLEASE CALL PT.    Timeframe paperwork needed: AS SOON AS POSSIBLE     Additional notes:   NEED NOTE FOR BIRTH OF BABY ADMITTED 8/19 - 8/22/2022  PATERNAL LEAVE OF 12 WEEKS  FROM 8/22/22.       - - -

## 2023-09-11 NOTE — ED PROVIDER NOTE - NS ED MD EM SELECTION
Worsening left lower lobe inflammation. Please make sure patient make appointment with pulmonologist.  Referral order already in place.
[id number] Comprehensive

## 2023-10-09 NOTE — ED PROVIDER NOTE - DATE/TIME 1
07-Apr-2018 18:50 Closure 3 Information: This tab is for additional flaps and grafts above and beyond our usual structured repairs.  Please note if you enter information here it will not currently bill and you will need to add the billing information manually.

## 2024-01-29 NOTE — SWALLOW BEDSIDE ASSESSMENT ADULT - H & P REVIEW
01/29/24      Jimenez Ochoa  [address]  [facility] [address]       Please excuse Jimenez from work for 1/24/2024, as he was under my care. He may return to work without any restrictions.    Sincerely,         Diane M Kreye, MA  [facility], [address]  [address]  [facility] [address]  Phone: [phone]  Fax: [phone]                 yes

## 2024-04-16 NOTE — ED PROVIDER NOTE - CROS ED NEURO ALL NEG
Continued Stay SW/CM Assessment/Plan of Care Note       Active Substitute Decision Maker (SDM)    There are no active Substitute Decision Maker (SDM) on file.           Progress note: HH referral sent via ecin to [facility].      
- - -

## 2024-04-22 NOTE — ED ADULT NURSE NOTE - PAIN RATING/NUMBER SCALE (0-10): ACTIVITY
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

## 2024-06-12 NOTE — ED PROVIDER NOTE - NS ED ATTENDING STATEMENT MOD
Stable PSA.  Has follow-up scheduled in September I have personally performed a face to face diagnostic evaluation on this patient. I have reviewed the ACP note and agree with the history, exam and plan of care, except as noted.

## 2025-02-06 NOTE — PHYSICAL THERAPY INITIAL EVALUATION ADULT - ORIENTATION, REHAB EVAL
Addended by: CLAUDIO BLANDON on: 2/6/2025 02:24 PM     Modules accepted: Orders    
Addended by: IZABEL DE LA PAZ on: 2/6/2025 01:04 PM     Modules accepted: Orders    
oriented to person, place, time and situation